# Patient Record
Sex: MALE | Race: WHITE | Employment: FULL TIME | URBAN - METROPOLITAN AREA
[De-identification: names, ages, dates, MRNs, and addresses within clinical notes are randomized per-mention and may not be internally consistent; named-entity substitution may affect disease eponyms.]

---

## 2017-08-16 ENCOUNTER — HOSPITAL ENCOUNTER (EMERGENCY)
Facility: HOSPITAL | Age: 47
Discharge: HOME | End: 2017-08-16
Attending: EMERGENCY MEDICINE
Payer: COMMERCIAL

## 2017-08-16 ENCOUNTER — APPOINTMENT (OUTPATIENT)
Dept: MRI IMAGING | Facility: HOSPITAL | Age: 47
End: 2017-08-16
Attending: EMERGENCY MEDICINE
Payer: COMMERCIAL

## 2017-08-16 VITALS
TEMPERATURE: 98 F | DIASTOLIC BLOOD PRESSURE: 96 MMHG | SYSTOLIC BLOOD PRESSURE: 146 MMHG | RESPIRATION RATE: 16 BRPM | HEART RATE: 82 BPM | OXYGEN SATURATION: 96 %

## 2017-08-16 DIAGNOSIS — H53.8 BLURRED VISION, LEFT EYE: Primary | ICD-10-CM

## 2017-08-16 LAB
BASOPHILS # BLD AUTO: 0.05 X10(3) UL (ref 0–0.1)
BASOPHILS NFR BLD AUTO: 0.9 %
BUN BLD-MCNC: 11 MG/DL (ref 8–20)
CALCIUM BLD-MCNC: 8.8 MG/DL (ref 8.3–10.3)
CHLORIDE: 103 MMOL/L (ref 101–111)
CO2: 29 MMOL/L (ref 22–32)
CREAT BLD-MCNC: 1.33 MG/DL (ref 0.7–1.3)
EOSINOPHIL # BLD AUTO: 0.08 X10(3) UL (ref 0–0.3)
EOSINOPHIL NFR BLD AUTO: 1.5 %
ERYTHROCYTE [DISTWIDTH] IN BLOOD BY AUTOMATED COUNT: 14 % (ref 11.5–16)
GLUCOSE BLD-MCNC: 82 MG/DL (ref 70–99)
HCT VFR BLD AUTO: 52.4 % (ref 37–53)
HGB BLD-MCNC: 18 G/DL (ref 13–17)
IMMATURE GRANULOCYTE COUNT: 0.02 X10(3) UL (ref 0–1)
IMMATURE GRANULOCYTE RATIO %: 0.4 %
LYMPHOCYTES # BLD AUTO: 1.17 X10(3) UL (ref 0.9–4)
LYMPHOCYTES NFR BLD AUTO: 21.7 %
MCH RBC QN AUTO: 31.8 PG (ref 27–33.2)
MCHC RBC AUTO-ENTMCNC: 34.4 G/DL (ref 31–37)
MCV RBC AUTO: 92.6 FL (ref 80–99)
MONOCYTES # BLD AUTO: 0.56 X10(3) UL (ref 0.1–0.6)
MONOCYTES NFR BLD AUTO: 10.4 %
NEUTROPHIL ABS PRELIM: 3.52 X10 (3) UL (ref 1.3–6.7)
NEUTROPHILS # BLD AUTO: 3.52 X10(3) UL (ref 1.3–6.7)
NEUTROPHILS NFR BLD AUTO: 65.1 %
PLATELET # BLD AUTO: 152 10(3)UL (ref 150–450)
POTASSIUM SERPL-SCNC: 4 MMOL/L (ref 3.6–5.1)
RBC # BLD AUTO: 5.66 X10(6)UL (ref 4.3–5.7)
RED CELL DISTRIBUTION WIDTH-SD: 46.9 FL (ref 35.1–46.3)
SED RATE-ML: 5 MM/HR (ref 0–12)
SODIUM SERPL-SCNC: 138 MMOL/L (ref 136–144)
WBC # BLD AUTO: 5.4 X10(3) UL (ref 4–13)

## 2017-08-16 PROCEDURE — 85025 COMPLETE CBC W/AUTO DIFF WBC: CPT | Performed by: EMERGENCY MEDICINE

## 2017-08-16 PROCEDURE — 80048 BASIC METABOLIC PNL TOTAL CA: CPT | Performed by: EMERGENCY MEDICINE

## 2017-08-16 PROCEDURE — 85652 RBC SED RATE AUTOMATED: CPT | Performed by: EMERGENCY MEDICINE

## 2017-08-16 PROCEDURE — 96374 THER/PROPH/DIAG INJ IV PUSH: CPT

## 2017-08-16 PROCEDURE — 99285 EMERGENCY DEPT VISIT HI MDM: CPT

## 2017-08-16 PROCEDURE — 99284 EMERGENCY DEPT VISIT MOD MDM: CPT

## 2017-08-16 PROCEDURE — A9575 INJ GADOTERATE MEGLUMI 0.1ML: HCPCS | Performed by: EMERGENCY MEDICINE

## 2017-08-16 PROCEDURE — 70553 MRI BRAIN STEM W/O & W/DYE: CPT | Performed by: EMERGENCY MEDICINE

## 2017-08-16 RX ORDER — LORAZEPAM 2 MG/ML
0.5 INJECTION INTRAMUSCULAR ONCE
Status: COMPLETED | OUTPATIENT
Start: 2017-08-16 | End: 2017-08-16

## 2017-08-17 NOTE — ED INITIAL ASSESSMENT (HPI)
Patient woke up at 0630 this am and stated his left eye vision was blurry. His vision was normal when he went to bed the previous night. He states he does have a slight headache, but he was working on a computer screen the last 8 hours.

## 2017-08-17 NOTE — ED PROVIDER NOTES
Patient Seen in: [facility] Emergency Department    History   Patient presents with:   Eye Visual Problem (opthalmic)    Stated Complaint: ha, visual changes    HPI    42-year-old male presents emergency room with chief complaint of visual disturbanc Intramuscular Solution,  INJECT 1ML EVERY 2 WEEKS   Syringe/Needle, Disp, (BD LUER-IDANIA SYRINGE) 25G X 1\" 3 ML Does not apply Misc,  To inject testosterone IM q 2 weeks   Atorvastatin Calcium (LIPITOR) 10 MG Oral Tab,  TAKE 1 PO DAILY   Levothyroxine Sodiu No limitation of ocular movements. Mucous membranes are moist, oropharynx is clear, uvula midline. Tympanic membranes are clear bilaterally, there is no external auditory canal swelling, there is no mastoid erythema or tenderness. Scalp is atraumatic. ocular pressures: 18 right, 17  left  ============================================================  ED Course  ------------------------------------------------------------  MDM   Patient had IV established, blood work obtained.   Patient was discussed with

## 2017-09-21 ENCOUNTER — OFFICE VISIT (OUTPATIENT)
Dept: NEUROLOGY | Facility: CLINIC | Age: 47
End: 2017-09-21

## 2017-09-21 VITALS
BODY MASS INDEX: 42 KG/M2 | WEIGHT: 274 LBS | HEART RATE: 78 BPM | RESPIRATION RATE: 16 BRPM | SYSTOLIC BLOOD PRESSURE: 120 MMHG | DIASTOLIC BLOOD PRESSURE: 64 MMHG

## 2017-09-21 DIAGNOSIS — H53.8 BLURRY VISION, LEFT EYE: Primary | ICD-10-CM

## 2017-09-21 PROCEDURE — 99205 OFFICE O/P NEW HI 60 MIN: CPT | Performed by: OTHER

## 2017-09-21 NOTE — PATIENT INSTRUCTIONS
Refill policies:    • Allow 2-3 business days for refills; controlled substances may take longer.   • Contact your pharmacy at least 5 days prior to running out of medication and have them send an electronic request or submit request through the [facility] have a procedure or additional testing performed. [facility]) will contact your insurance carrier to obtain pre-certification or prior authorization.     Unfortunately, [facility] has seen an increase in denial of payment even though the p

## 2017-09-21 NOTE — PROGRESS NOTES
Neurology H&P    Nicole Lu Patient Status:  No patient class for patient encounter    1970 MRN [id number]   Location [facility], [address] [place] [address] Drive, [address] [place] [address] Attending No att. providers found   Hosp Day # 0 PCP TIGIST COPE DO inject testosterone IM q 2 weeks Disp: 10 each Rfl: 1   Atorvastatin Calcium (LIPITOR) 10 MG Oral Tab TAKE 1 PO DAILY Disp: 90 tablet Rfl: 3   Levothyroxine Sodium (SYNTHROID) 50 MCG Oral Tab Take 1 tablet by mouth daily.  Disp: 90 tablet Rfl: 4   FLUTICASO fatigue  Musculoskeletal: denies back pain, denies joint pain  Psych: denies depression   Skin: denies any new masses, rashes, lumps or bruising    Objective/Physical Exam:    Vital Signs: There were no vitals taken for this visit.     Gen: Awake and in no blurry vision in the L eye but no red desaturation or painful eye movements. MRI imagine was reviewed and there were multiple small T2 hyperintensities and one larger L corona radiata lesion.  These do not necessarily fit with his monocular vision impairmen

## 2017-10-17 RX ORDER — SODIUM CHLORIDE 9 MG/ML
INJECTION, SOLUTION INTRAVENOUS CONTINUOUS
Status: CANCELLED | OUTPATIENT
Start: 2017-10-17

## 2017-10-17 RX ORDER — CLINDAMYCIN PHOSPHATE 900 MG/50ML
900 INJECTION INTRAVENOUS
Status: CANCELLED | OUTPATIENT
Start: 2017-10-18 | End: 2017-10-18

## 2017-10-18 ENCOUNTER — APPOINTMENT (OUTPATIENT)
Dept: LAB | Facility: HOSPITAL | Age: 47
End: 2017-10-18
Attending: Other
Payer: COMMERCIAL

## 2017-10-18 ENCOUNTER — HOSPITAL ENCOUNTER (OUTPATIENT)
Dept: MRI IMAGING | Facility: HOSPITAL | Age: 47
Discharge: HOME | End: 2017-10-18
Attending: Other
Payer: COMMERCIAL

## 2017-10-18 ENCOUNTER — HOSPITAL ENCOUNTER (OUTPATIENT)
Dept: GENERAL RADIOLOGY | Facility: HOSPITAL | Age: 47
Discharge: HOME | End: 2017-10-18
Attending: Other
Payer: COMMERCIAL

## 2017-10-18 ENCOUNTER — TELEPHONE (OUTPATIENT)
Dept: NEUROLOGY | Facility: CLINIC | Age: 47
End: 2017-10-18

## 2017-10-18 VITALS
HEART RATE: 72 BPM | DIASTOLIC BLOOD PRESSURE: 102 MMHG | HEIGHT: 68 IN | WEIGHT: 270 LBS | TEMPERATURE: 98 F | BODY MASS INDEX: 40.92 KG/M2 | SYSTOLIC BLOOD PRESSURE: 138 MMHG | OXYGEN SATURATION: 95 % | RESPIRATION RATE: 16 BRPM

## 2017-10-18 DIAGNOSIS — H53.8 BLURRY VISION, LEFT EYE: Primary | ICD-10-CM

## 2017-10-18 DIAGNOSIS — H53.8 BLURRY VISION, LEFT EYE: ICD-10-CM

## 2017-10-18 PROCEDURE — 70543 MRI ORBT/FAC/NCK W/O &W/DYE: CPT | Performed by: OTHER

## 2017-10-18 PROCEDURE — 83916 OLIGOCLONAL BANDS: CPT

## 2017-10-18 PROCEDURE — 36415 COLL VENOUS BLD VENIPUNCTURE: CPT

## 2017-10-18 PROCEDURE — 62270 DX LMBR SPI PNXR: CPT | Performed by: OTHER

## 2017-10-18 PROCEDURE — 82945 GLUCOSE OTHER FLUID: CPT

## 2017-10-18 PROCEDURE — 84157 ASSAY OF PROTEIN OTHER: CPT

## 2017-10-18 PROCEDURE — 89050 BODY FLUID CELL COUNT: CPT

## 2017-10-18 PROCEDURE — 89051 BODY FLUID CELL COUNT: CPT

## 2017-10-18 PROCEDURE — 70553 MRI BRAIN STEM W/O & W/DYE: CPT | Performed by: OTHER

## 2017-10-18 PROCEDURE — 85610 PROTHROMBIN TIME: CPT

## 2017-10-18 PROCEDURE — 77003 FLUOROGUIDE FOR SPINE INJECT: CPT | Performed by: OTHER

## 2017-10-18 PROCEDURE — 85027 COMPLETE CBC AUTOMATED: CPT

## 2017-10-18 PROCEDURE — A9575 INJ GADOTERATE MEGLUMI 0.1ML: HCPCS | Performed by: OTHER

## 2017-10-18 NOTE — PROCEDURES
PROCEDURE: XR LUMBAR PUNCTURE  (CPT=62270,79058)    COMPARISON: None.     INDICATIONS: H53.8 Other visual disturbances    TECHNIQUE: The risks, benefits, and alternatives of the procedure were explained to the patient and witnessed informed written and verb

## 2017-10-18 NOTE — IMAGING NOTE
Pt here for LP procedure. Pre-procedure vitals recorded and BP elevated in both arms. Denies use of any blood thinners currently. Procedure explained and pt verbalized understanding. SBAR given to [facility].  Elevated BP to Dr Tia Martínez prior to pr

## 2017-11-01 ENCOUNTER — OFFICE VISIT (OUTPATIENT)
Dept: NEUROLOGY | Facility: CLINIC | Age: 47
End: 2017-11-01

## 2017-11-01 VITALS
BODY MASS INDEX: 41 KG/M2 | WEIGHT: 270 LBS | SYSTOLIC BLOOD PRESSURE: 136 MMHG | RESPIRATION RATE: 16 BRPM | HEART RATE: 84 BPM | DIASTOLIC BLOOD PRESSURE: 90 MMHG

## 2017-11-01 DIAGNOSIS — G35 MULTIPLE SCLEROSIS (HCC): Primary | ICD-10-CM

## 2017-11-01 PROCEDURE — 99214 OFFICE O/P EST MOD 30 MIN: CPT | Performed by: OTHER

## 2017-11-01 RX ORDER — LEVOTHYROXINE SODIUM 0.07 MG/1
75 TABLET ORAL
COMMUNITY
End: 2021-12-02

## 2017-11-01 NOTE — PATIENT INSTRUCTIONS
Refill policies:    • Allow 2-3 business days for refills; controlled substances may take longer.   • Contact your pharmacy at least 5 days prior to running out of medication and have them send an electronic request or submit request through the [facility] have a procedure or additional testing performed. [facility]) will contact your insurance carrier to obtain pre-certification or prior authorization.     Unfortunately, DESHAUN has seen an increase in denial of payment even though the p

## 2017-11-01 NOTE — PROGRESS NOTES
Pt here for follow up for blurred vision. Pt reports no change in symptoms from last visit. Pt had LP and MRI on 10/18/17. Results available in [facility].

## 2017-11-01 NOTE — PROGRESS NOTES
Neurology H&P    [facility] Patient Status:  No patient class for patient encounter    1970 MRN [id number]   Location [address] [place] [address], [address] [place] [address], [address] [place] [address] Attending No att. providers found   Hosp Day # 0 PCP TIGIST COPE DO BLE numbness. He feels that his hips ar a little stiff. He does endorse that his vision seems a little blurry but not like what it was during his last episode that took him to the ED. He endorses constant fatigue. He denies any specific weakness.  He denies Unspecified essential hypertension        PSHx:  Past Surgical History:  No date: ARTHROSCOPY OF JOINT UNLISTED      Comment: yoav shoulder  8/2007,11/2009: HERNIA SURGERY      Comment: x3  09/2008: OTHER SURGICAL HISTORY      Comment: SINUS x2    SocHx:  S no tongue fasciculations or atrophy, strong shoulder shrug.     MOTOR EXAMINATION: normal tone, no fasciculations, normal strength throughout in UEs and LEs with exception of pain lmiting 4/5 L HF weakness    SENSORY EXAMINATION:  UE: intact to light touch, inflammatory or infectious disease. Demyelination is favored. No abnormal signal within the brainstem or cerebellum. Assessment: This is a 49 y/o male with blurry vision and MRI findings concerning for a demyelinating process.  LP showed 10+ haley

## 2017-11-08 ENCOUNTER — TELEPHONE (OUTPATIENT)
Dept: NEUROLOGY | Facility: CLINIC | Age: 47
End: 2017-11-08

## 2017-11-08 RX ORDER — GLATIRAMER 40 MG/ML
40 INJECTION, SOLUTION SUBCUTANEOUS
Qty: 12 SYRINGE | Refills: 11 | Status: SHIPPED | OUTPATIENT
Start: 2017-11-08 | End: 2018-03-01 | Stop reason: ALTCHOICE

## 2017-11-08 RX ORDER — CYCLOBENZAPRINE HCL 10 MG
10 TABLET ORAL 3 TIMES DAILY PRN
Qty: 20 TABLET | Refills: 0 | Status: SHIPPED | OUTPATIENT
Start: 2017-11-08 | End: 2021-01-26

## 2017-11-08 NOTE — TELEPHONE ENCOUNTER
Initiated [employer] form.  Left message on patient identified voicemail (ok with HIPPA consent) informing patient we need him to sign form and verify if he has separate pharmacy plan as unable to verify insurance through CoverMyMeds in order to initi

## 2017-11-08 NOTE — TELEPHONE ENCOUNTER
I spoke to . Jazmine Ortiz on the phone today. We talked about starting copaxone for his newly diagnosed MS. He is willing to start this medication. It has been ordered. 40mcg 3 times weekly injections.  I will also start a trial of Flexeril for lower bach and

## 2017-11-14 ENCOUNTER — TELEPHONE (OUTPATIENT)
Dept: NEUROLOGY | Facility: CLINIC | Age: 47
End: 2017-11-14

## 2017-11-14 NOTE — TELEPHONE ENCOUNTER
Spoke with patient. He was requesting status update on Copaxone order. Informed him that the PA is still pending with insurance, and we have been checking regularly on status.   Per fax received on 11/8/17 from [facility], they are processing the request, and we

## 2017-11-16 NOTE — TELEPHONE ENCOUNTER
Rec'd incoming fax from [facility] requesting additional questions be answered regarding PA for Copaxone. Form completed and placed in Dr. Bruna Walter folder for signature.

## 2017-11-17 NOTE — TELEPHONE ENCOUNTER
Form completed and signed . Faxed to [facility] prior Authorization. Confirmation received.      Copy of form in folder

## 2017-11-17 NOTE — TELEPHONE ENCOUNTER
See TE 11/8/17- Copaxone form from [address] [place] [address] (for additional information) completed and signed . Faxed to [facility] prior Authorization. Confirmation received    Left a detailed message on patient's confidential voicemail informing above.

## 2017-11-20 NOTE — TELEPHONE ENCOUNTER
Approval for Copaxone received via fax from [facility]. Approved, 11/17/2017-11/17/2019    PA# [id number] SHADY      Left a message on patient's confidential voicemail relaying above.      Starter form, approval, face sheet and insurance information fa

## 2017-11-30 NOTE — TELEPHONE ENCOUNTER
Received fax from [employer] asking for clarification of whether patient should be on generic Glatimer Acetate, which was shipped to patient on 11/22/17, or brand, which was sent to [employer] by [employer]. [employer] cannot offer support for Generic.   Pa

## 2017-12-05 NOTE — TELEPHONE ENCOUNTER
Patient states he faxed form back to us same day he received it. No documentation of form being received. Patient will refax form today.

## 2017-12-06 NOTE — TELEPHONE ENCOUNTER
Received signed form from patient. Form completed and faxed to [address] [place] [address]. Confirmation received.

## 2018-01-09 ENCOUNTER — OFFICE VISIT (OUTPATIENT)
Dept: NEUROLOGY | Facility: CLINIC | Age: 48
End: 2018-01-09

## 2018-01-09 VITALS
BODY MASS INDEX: 40.16 KG/M2 | SYSTOLIC BLOOD PRESSURE: 136 MMHG | HEIGHT: 68 IN | DIASTOLIC BLOOD PRESSURE: 98 MMHG | WEIGHT: 265 LBS | RESPIRATION RATE: 18 BRPM | HEART RATE: 90 BPM

## 2018-01-09 DIAGNOSIS — G35 MULTIPLE SCLEROSIS (HCC): Primary | ICD-10-CM

## 2018-01-09 PROCEDURE — 99213 OFFICE O/P EST LOW 20 MIN: CPT | Performed by: OTHER

## 2018-01-09 NOTE — PATIENT INSTRUCTIONS
Refill policies:    • Allow 2-3 business days for refills; controlled substances may take longer.   • Contact your pharmacy at least 5 days prior to running out of medication and have them send an electronic request or submit request through the [facility] have a procedure or additional testing performed. [facility]) will contact your insurance carrier to obtain pre-certification or prior authorization.     Unfortunately, DESHAUN has seen an increase in denial of payment even though the p

## 2018-01-09 NOTE — PROGRESS NOTES
Neurology H&P    Navid Schwarz Patient Status:  No patient class for patient encounter    1970 MRN [id number]   Location [address] [place] [address], [address] [place] [address] Drive, [address] [place] [address] Attending No att. providers found   Hosp Day # 0 PCP Sabrina Montemayor MD any change in his vision. He states that actually feels very well and has been more active lately and has last weight and is much more active. He tells me that the pain and numbness in his legs is gone.      Current Medications:    Current Outpatient Prescr thyroid     Acute recurrent maxillary sinusitis     FH: gastric cancer     Blurry vision, left eye     Multiple sclerosis (HCC)      PMHx:  Past Medical History:   Diagnosis Date   • Chronic rhinitis    • HYPOTHYROIDISM    • Other abnormal glucose     Loss and fund of knowledge.       CRANIAL NERVES II to XII: PERRLA, no ptosis or diplopia, EOM intact, loss of VF in bilateral lateral fields (states it is chronic since childhood), facial sensation intact, strong eye closure and lower facial muscles & jaw muscl infarction. No mass. 2. There are numerous foci of T2/flair hyperintensity in the periventricular deep white matter and subcortical white matter of the cerebrum for a patient of this age.  The differential diagnosis would include demyelination, chronic jen

## 2018-07-30 ENCOUNTER — OFFICE VISIT (OUTPATIENT)
Dept: NEUROLOGY | Facility: CLINIC | Age: 48
End: 2018-07-30
Payer: COMMERCIAL

## 2018-07-30 VITALS
WEIGHT: 262 LBS | SYSTOLIC BLOOD PRESSURE: 126 MMHG | BODY MASS INDEX: 40 KG/M2 | HEART RATE: 78 BPM | DIASTOLIC BLOOD PRESSURE: 80 MMHG

## 2018-07-30 DIAGNOSIS — G35 MULTIPLE SCLEROSIS (HCC): Primary | ICD-10-CM

## 2018-07-30 PROCEDURE — 99213 OFFICE O/P EST LOW 20 MIN: CPT | Performed by: OTHER

## 2018-07-30 RX ORDER — TESTOSTERONE CYPIONATE 200 MG/ML
200 INJECTION INTRAMUSCULAR
COMMUNITY
End: 2019-07-02 | Stop reason: ALTCHOICE

## 2018-07-30 NOTE — PROGRESS NOTES
Neurology H&P    [facility] Patient Status:  No patient class for patient encounter    1970 MRN [id number]   Location ED [facility], [address], [address] Attending No att. providers found   Hosp Day # 0 PCP Nita Osborn MD but this has been ongoing for him since his diagnosis with MS and he has been following up with opthalmology. He has no new numbness weakness or tingling. He denies any falls or difficulty walking. He has no leg pain at all now. He does feel more fatigue. Acute recurrent maxillary sinusitis     FH: gastric cancer     Blurry vision, left eye     Multiple sclerosis ([address]      PMHx:  Past Medical History:   Diagnosis Date   • Chronic rhinitis    • HYPOTHYROIDISM    • Other abnormal glucose     Loss of peripher fund of knowledge.       CRANIAL NERVES II to XII: PERRLA, no ptosis or diplopia, EOM intact, loss of VF in bilateral lateral fields (states it is chronic since childhood), facial sensation intact, strong eye closure and lower facial muscles & jaw muscles; infarction. No mass. 2. There are numerous foci of T2/flair hyperintensity in the periventricular deep white matter and subcortical white matter of the cerebrum for a patient of this age.  The differential diagnosis would include demyelination, chronic jen

## 2018-07-30 NOTE — PROGRESS NOTES
Pt is here for follow up for MS. Pt states that since the last time we seen him Pt states that he has had less motivation. Pt states he is the same since the last time we seen him.

## 2018-10-04 ENCOUNTER — TELEPHONE (OUTPATIENT)
Dept: SURGERY | Facility: CLINIC | Age: 48
End: 2018-10-04

## 2018-10-04 NOTE — TELEPHONE ENCOUNTER
Per Dr Lonny Gilliam notes:   Assessment: This is a 47 y/o male with multiple sclerosis. He should continue Copaxone at this time.       Plan:  1.  Multiple Sclerosis  - MRI brain with demyelinating lesions  - LP with >10 oligoclonal bands and MRI with eviden

## 2018-11-08 DIAGNOSIS — G35 MULTIPLE SCLEROSIS (HCC): ICD-10-CM

## 2018-11-08 RX ORDER — GLATIRAMER 40 MG/ML
INJECTION, SOLUTION SUBCUTANEOUS
Qty: 12 SYRINGE | Refills: 1 | Status: SHIPPED | OUTPATIENT
Start: 2018-11-08 | End: 2019-02-19

## 2018-11-08 NOTE — TELEPHONE ENCOUNTER
Medication: GLATIRAMER ACETATE 40 MG/ML Subcutaneous Solution Prefilled Syringe    Date of last refill: 11/08/2017 (#12/11)  [place]    Last office visit: 7/30/2018  Due back to clinic per last office note:  03/30/20

## 2018-12-19 ENCOUNTER — TELEPHONE (OUTPATIENT)
Dept: NEUROLOGY | Facility: CLINIC | Age: 48
End: 2018-12-19

## 2018-12-19 NOTE — TELEPHONE ENCOUNTER
No I would not start daily prednisone for his well controlled MS.  Chronic prednisone use has serious potential consequences and would not be appropriate at this time

## 2018-12-19 NOTE — TELEPHONE ENCOUNTER
Pt has been on prednisone for 7 days for a sinus infection and is almost done with the medication. Pt has MS and this medication has been helping. Pt needs to know if there is a medication similar to prednisone that Dr. Tatianna Molina can perscribe for the MS.

## 2019-01-28 ENCOUNTER — TELEPHONE (OUTPATIENT)
Dept: NEUROLOGY | Facility: CLINIC | Age: 49
End: 2019-01-28

## 2019-01-28 NOTE — TELEPHONE ENCOUNTER
Patient would like to discuss getting a Rx for CBD oil. Informed patient that [facility] does not prescribe CBD oil at this time. Patient requested that RN discuss with Dr. Pierre Vazquez as patient would like to get CBD oil from a reputable source.     Routed to pr

## 2019-01-29 NOTE — TELEPHONE ENCOUNTER
Called patient and informed him that Dr. Geovani Tejeda has no medical opinion on patient taking hemp/CBD oil for mood. Informed patient that he should speak with PCP. Patient VU and denies any further needs at this time.

## 2019-02-19 DIAGNOSIS — G35 MULTIPLE SCLEROSIS (HCC): ICD-10-CM

## 2019-02-19 RX ORDER — GLATIRAMER 40 MG/ML
INJECTION, SOLUTION SUBCUTANEOUS
Qty: 12 SYRINGE | Refills: 0 | Status: SHIPPED | OUTPATIENT
Start: 2019-02-19 | End: 2019-03-26

## 2019-02-19 NOTE — TELEPHONE ENCOUNTER
Medication: GLATIRAMER ACETATE 40 MG/ML Subcutaneous Solution Prefilled Syringe    Date of last refill: 11/08/18 (#12 syringe/1)  [place]    Last office visit: 07/30/18  Due back to clinic per last office note:  Devin Gitelman

## 2019-03-26 DIAGNOSIS — G35 MULTIPLE SCLEROSIS (HCC): ICD-10-CM

## 2019-03-26 RX ORDER — GLATIRAMER 40 MG/ML
INJECTION, SOLUTION SUBCUTANEOUS
Qty: 12 SYRINGE | Refills: 3 | Status: SHIPPED | OUTPATIENT
Start: 2019-03-26 | End: 2019-10-18

## 2019-03-26 NOTE — TELEPHONE ENCOUNTER
Spoke with patient and explained that he needs to schedule a follow-up visit with Dr Jimmie Felipe before refill can be processed. Patient transferred to  to set up nichole't.       Medication: GLATIRAMER ACETATE 40 MG/ML Subcutaneous Solution Prefilled Sy

## 2019-07-02 ENCOUNTER — OFFICE VISIT (OUTPATIENT)
Dept: NEUROLOGY | Facility: CLINIC | Age: 49
End: 2019-07-02
Payer: COMMERCIAL

## 2019-07-02 VITALS
SYSTOLIC BLOOD PRESSURE: 128 MMHG | DIASTOLIC BLOOD PRESSURE: 78 MMHG | HEART RATE: 80 BPM | WEIGHT: 272 LBS | RESPIRATION RATE: 14 BRPM | BODY MASS INDEX: 41.22 KG/M2 | HEIGHT: 68 IN

## 2019-07-02 DIAGNOSIS — G35 MULTIPLE SCLEROSIS (HCC): Primary | ICD-10-CM

## 2019-07-02 PROCEDURE — 99213 OFFICE O/P EST LOW 20 MIN: CPT | Performed by: OTHER

## 2019-07-02 RX ORDER — LANSOPRAZOLE 30 MG/1
CAPSULE, DELAYED RELEASE ORAL
COMMUNITY
End: 2019-07-02 | Stop reason: ALTCHOICE

## 2019-07-02 RX ORDER — HYDROCODONE BITARTRATE AND ACETAMINOPHEN 5; 325 MG/1; MG/1
1 TABLET ORAL
Refills: 0 | COMMUNITY
Start: 2019-05-31 | End: 2019-11-01 | Stop reason: ALTCHOICE

## 2019-07-02 RX ORDER — PREDNISONE 10 MG/1
TABLET ORAL
Refills: 0 | COMMUNITY
Start: 2019-06-03 | End: 2019-07-02 | Stop reason: ALTCHOICE

## 2019-07-02 NOTE — PROGRESS NOTES
Patient reports he has Allegra Ordonez. Patient states he went to the ER approximately 2 months ago with shingles in the ear causing face pain and speech impediment.

## 2019-07-02 NOTE — PROGRESS NOTES
Neurology H&P    Melissa Carrillo Patient Status:  No patient class for patient encounter    1970 MRN [id number]   Location [facility], [address] [place] [address] Drive, [address] [place] [address] Attending No att. providers found   [facility] Day # 0 PCP Levan Nageotte Su testosterone. He deneis nay new numbness, weakness or tingling. Current Medications:    Current Outpatient Medications:  HYDROcodone-acetaminophen 5-325 MG Oral Tab Take 1 tablet by mouth.  for pain Disp:  Rfl: 0   Testosterone (TESTOPEL [place]) by [employer]   • OTHER DISEASES     hypogonadism   • Unspecified essential hypertension        PSHx:  Past Surgical History:   Procedure Laterality Date   • ARTHROSCOPY OF JOINT UNLISTED      yoav shoulder   • HERNIA SURGERY  8/2007,11/2009    x3   • OTHER SURGIC intact, strong eye closure and lower facial muscles & jaw muscles; palate elevation intact, no dysarthria, no tongue fasciculations or atrophy, strong shoulder shrug.     MOTOR EXAMINATION: normal tone, no fasciculations, normal strength throughout in UEs a microvascular change,   changes related to previous inflammatory or infectious disease. Demyelination is favored. No abnormal signal within the brainstem or cerebellum. Assessment: This is a 43 y/o male with multiple sclerosis.  He should contin

## 2019-08-01 ENCOUNTER — TELEPHONE (OUTPATIENT)
Dept: NEUROLOGY | Facility: CLINIC | Age: 49
End: 2019-08-01

## 2019-08-01 DIAGNOSIS — G35 MULTIPLE SCLEROSIS (HCC): Primary | ICD-10-CM

## 2019-08-01 NOTE — TELEPHONE ENCOUNTER
Spoke with patient who states he would like to switch to [facility]. Requests Tecfidera Start Form be faxed to him for completion. Fax confirmation received.

## 2019-08-01 NOTE — TELEPHONE ENCOUNTER
Pt is currently on generic Copaxone; dr asked pt to call office when he's close to finishing his current supply of this medication because Dr said he wanted to switch pt to Tecfidera.

## 2019-08-05 ENCOUNTER — TELEPHONE (OUTPATIENT)
Dept: NEUROLOGY | Facility: CLINIC | Age: 49
End: 2019-08-05

## 2019-08-05 DIAGNOSIS — G35 MULTIPLE SCLEROSIS (HCC): Primary | ICD-10-CM

## 2019-08-05 NOTE — TELEPHONE ENCOUNTER
PA initiated for Tecfidera. Approved verbally by Dr Moreno Aguilar.     Diagnosis:  MS  Type: Primary  Date Diagnosed: 11/2017     Previous therapies:    Note date used and reason for change for applicable medications:  Example:  side effects, disease progresion,

## 2019-08-13 ENCOUNTER — TELEPHONE (OUTPATIENT)
Dept: NEUROLOGY | Facility: CLINIC | Age: 49
End: 2019-08-13

## 2019-08-13 DIAGNOSIS — G35 MULTIPLE SCLEROSIS (HCC): Primary | ICD-10-CM

## 2019-08-13 NOTE — TELEPHONE ENCOUNTER
See Alternate TE 8/01/2019 and 08/05/2019. Patient to stop Copaxone and start Tecfidera. Per TE 8/1/2019 - informed patient he needs CBC and CMP. Will discuss JCV with provider. PA started for Tecfidera. - Key Code AUTDULRT.     CBC and CMP have bee

## 2019-08-13 NOTE — TELEPHONE ENCOUNTER
MS paperwork was faxed to [facility] on 8/2/2019 and PA was not completed at that time. Called patient to inform him that PA had not been completed yet and apologized to patient.  Requested that patient come to [facility] to complete Tecfidera paperwork due to fax dorcas

## 2019-08-15 NOTE — TELEPHONE ENCOUNTER
Per Dr Corina Sadler, he does want patient to have JCV. Patient notified and Patient verbalizes understanding JCV needs to be done at [address] requisitions mailed to patient.

## 2019-09-26 ENCOUNTER — TELEPHONE (OUTPATIENT)
Dept: NEUROLOGY | Facility: CLINIC | Age: 49
End: 2019-09-26

## 2019-09-26 NOTE — TELEPHONE ENCOUNTER
LMTCB about his overdue test results to complete his Tecfidera start form. Upon return call please verify the pt is going to complete the JCV, CBC, and CMP. These need to be completed to start patient on Tecfidera.

## 2019-10-18 DIAGNOSIS — G35 MULTIPLE SCLEROSIS (HCC): ICD-10-CM

## 2019-10-18 RX ORDER — GLATIRAMER 40 MG/ML
INJECTION, SOLUTION SUBCUTANEOUS
Qty: 12 SYRINGE | Refills: 2 | Status: SHIPPED | OUTPATIENT
Start: 2019-10-18 | End: 2020-11-19

## 2019-10-18 NOTE — TELEPHONE ENCOUNTER
Per Epic review, patient has appointment on 11/1/2019. To date, no JCV, CBC or CMP has been received in office. Tecfidera has been approved from 8/13/2019 to 8/13/2021. Called patient and discussed above testing with him.  He states that he will c

## 2019-11-01 ENCOUNTER — OFFICE VISIT (OUTPATIENT)
Dept: NEUROLOGY | Facility: CLINIC | Age: 49
End: 2019-11-01
Payer: COMMERCIAL

## 2019-11-01 VITALS
WEIGHT: 275 LBS | SYSTOLIC BLOOD PRESSURE: 130 MMHG | RESPIRATION RATE: 16 BRPM | DIASTOLIC BLOOD PRESSURE: 84 MMHG | HEART RATE: 80 BPM | BODY MASS INDEX: 42 KG/M2

## 2019-11-01 DIAGNOSIS — G35 MULTIPLE SCLEROSIS (HCC): Primary | ICD-10-CM

## 2019-11-01 PROCEDURE — 99213 OFFICE O/P EST LOW 20 MIN: CPT | Performed by: OTHER

## 2019-11-01 RX ORDER — DIMETHYL FUMARATE 240 MG/1
240 CAPSULE ORAL 2 TIMES DAILY
Qty: 180 CAPSULE | Refills: 3 | COMMUNITY
Start: 2019-11-01 | End: 2020-08-21

## 2019-11-01 RX ORDER — DIMETHYL FUMARATE 120 MG/1
CAPSULE ORAL
Qty: 14 CAPSULE | Refills: 0 | COMMUNITY
Start: 2019-11-01 | End: 2020-10-13

## 2019-11-01 NOTE — TELEPHONE ENCOUNTER
Pt in office for follow up 11/1/19. Medication previously approved. Blood work completed. Start form completed, insurance/facesheet, approval info and JCV faxed to [employer]. Confirmation received.

## 2019-11-01 NOTE — PROGRESS NOTES
Pt states he is in office to discuss MS medication change, he states his condition has not worsened.     Pt also states that during a recent trip he tried [facility] edibles and states he experienced significant relief and, pt states he was able to rest without pa

## 2019-11-01 NOTE — PROGRESS NOTES
Neurology H&P    Almaz Peralta Patient Status:  No patient class for patient encounter    1970 MRN [id number]   Location [address] [place] [address], [address] [place] [address] Drive, [address] [place] [address] Attending No att. providers found   [facility] Day # 0 PCP Ginny Simmonds Su at this time. Current Medications:  GLATIRAMER ACETATE 40 MG/ML Subcutaneous Solution Prefilled Syringe, INJECT ONE SYRINGE SUBCUTANEOUSLY 3 TIMES A WEEK AT LEAST 48 HOURS APART. ALLOW TO WARM TO ROOM TEMP FOR 20 MINUTES.  REFRIGERATE., Disp: 12 Syring Loss of peripheral vision   • OTHER DISEASES     hypogonadism   • Shingles     Chrystine Smoker   • Unspecified essential hypertension        PSHx:  Past Surgical History:   Procedure Laterality Date   • ARTHROSCOPY OF JOINT UNLISTED      yoav shoulder   • HERNI childhood), facial sensation intact, strong eye closure and lower facial muscles & jaw muscles; palate elevation intact, no dysarthria, no tongue fasciculations or atrophy, strong shoulder shrug.     MOTOR EXAMINATION: normal tone, no fasciculations, normal 6.1 - 8.1 g/dL 6.7   Albumin      3.6 - 5.1 g/dL 4.1   Globulin, Total      1.9 - 3.7 g/dL (calc) 2.6   ALBUMIN/GLOBULIN RATIO      1.0 - 2.5 (calc) 1.6   Total Bilirubin      0.2 - 1.2 mg/dL 0.8   Alkaline Phosphatase      40 - 115 U/L 72   AST      10 -

## 2020-07-06 ENCOUNTER — PATIENT MESSAGE (OUTPATIENT)
Dept: NEUROLOGY | Facility: CLINIC | Age: 50
End: 2020-07-06

## 2020-07-06 NOTE — TELEPHONE ENCOUNTER
From: Ken Subramanian  To: Jimena Lopes DO  Sent: 7/6/2020 11:17 AM CDT  Subject: Prescription Question    I have noticed stomach issues on Tecfidera. 90% of them are tolerable. A glass of wine or a vodka drink feels like a stabbing cramping pain.  No

## 2020-08-11 ENCOUNTER — PATIENT MESSAGE (OUTPATIENT)
Dept: NEUROLOGY | Facility: CLINIC | Age: 50
End: 2020-08-11

## 2020-08-11 NOTE — TELEPHONE ENCOUNTER
[facility]  To: Poly Bynum DO  Sent: 8/11/2020 10:33 AM CDT  Subject: Prescription Question    \" I suppose that you could gop off the tecfidera for a couple of weeks and see if that is what ios causing your stomach cramping.  You have no

## 2020-08-14 ENCOUNTER — PATIENT MESSAGE (OUTPATIENT)
Dept: NEUROLOGY | Facility: CLINIC | Age: 50
End: 2020-08-14

## 2020-08-14 DIAGNOSIS — G35 MULTIPLE SCLEROSIS (HCC): Primary | ICD-10-CM

## 2020-08-17 NOTE — TELEPHONE ENCOUNTER
From: Monica Mcdaniel  To: Author DO Monica  Sent: 8/14/2020 4:33 PM CDT  Subject: Visit Follow-up Question    I am willing to give the 120 a try. I tried to reply to the other email but it says it closed.  Do you know if I would need to re-set up the

## 2020-08-21 RX ORDER — DIMETHYL FUMARATE 120 MG/1
120 CAPSULE ORAL 2 TIMES DAILY
Qty: 60 CAPSULE | Refills: 0 | Status: SHIPPED | OUTPATIENT
Start: 2020-08-21 | End: 2020-09-05

## 2020-08-21 NOTE — TELEPHONE ENCOUNTER
Spoke with Dr. Idalia Mesa to give 30 day supply of 120 mg Tecfidera. Called patient and he states he gets Tecfidera from [facility].     Instructed patient to call [facility] after 2 weeks on new dose to update on status of how medication is bein

## 2020-08-24 NOTE — TELEPHONE ENCOUNTER
Prescription clarification request received and completed confirming dosage change to Tecfidera 120mg bid. Form faxed to [facility] pharmacy with fax confirmation received.

## 2020-09-10 ENCOUNTER — TELEPHONE (OUTPATIENT)
Dept: NEUROLOGY | Facility: CLINIC | Age: 50
End: 2020-09-10

## 2020-09-10 NOTE — TELEPHONE ENCOUNTER
Spoke to Kody at [employer] and gave verbal approval for generic Tecfidera ( after confirming with Dr Sebas Montez.)

## 2020-09-15 ENCOUNTER — TELEPHONE (OUTPATIENT)
Dept: NEUROLOGY | Facility: CLINIC | Age: 50
End: 2020-09-15

## 2020-09-15 NOTE — TELEPHONE ENCOUNTER
Received fax from [facility] requesting clinical info regarding PA . PA completed on CMM and Tecfidera approved 8/13/2019-8/13/2021.

## 2020-09-16 NOTE — TELEPHONE ENCOUNTER
Form from [facility] regarding the reduced dosage of Tecfidera completed and placed on provider's desk for review and signature.

## 2020-09-18 NOTE — TELEPHONE ENCOUNTER
Receicved call from Kody with [facility] pharmacy. She states they are unable to ship medication as there is a quantity limit. Per Kody there is a quantity limit issue. RN is unable to locate fax sent on 9/17.

## 2020-09-23 NOTE — TELEPHONE ENCOUNTER
Spoke with Marquis Scheuermann at [address] [place] [address] who states the Rx Tecfidera 120mg still will not go through.   Fax received from [facility] that PA Tecfidera 120mg denied- Does not allow coverage of additional quantities of Tecfidera if lower dose of 120mg bid has alrea

## 2020-09-24 NOTE — TELEPHONE ENCOUNTER
Spoke with Galo Roman at [address] [place] [address] [employer] she states because patient is titrating down in dosage, patient's insurance will only approve Tecfidera 120mg #14 (1 week supply) and appeal needs to be submitted as Urgent.  Patient can receive 1 week supply at a

## 2020-09-28 NOTE — TELEPHONE ENCOUNTER
Received authorization for Tecfidera 120 mg valid from 8/25/2020 to 10/23/2020. Per notes below, appeal letter has been signed, but will need to be sent.

## 2020-09-29 NOTE — TELEPHONE ENCOUNTER
Spoke with patient who states he has not received the Tecfidera yet.  Explained to patient that an appeal letter was submitted and he has been approved Tecfidera 120mg bid weekly through 10/23/20  Spoke with Martir Garvin at [employer] who states the patient has

## 2020-10-13 DIAGNOSIS — G35 MULTIPLE SCLEROSIS (HCC): ICD-10-CM

## 2020-10-13 RX ORDER — DIMETHYL FUMARATE 120 MG/1
CAPSULE ORAL
Qty: 56 CAPSULE | Refills: 0 | Status: SHIPPED | OUTPATIENT
Start: 2020-10-13 | End: 2020-11-19 | Stop reason: ALTCHOICE

## 2020-10-28 ENCOUNTER — TELEPHONE (OUTPATIENT)
Dept: NEUROLOGY | Facility: CLINIC | Age: 50
End: 2020-10-28

## 2020-11-02 NOTE — TELEPHONE ENCOUNTER
Tecfidera PA for 120mg bid faxed to [facility] with fax confirmation received.

## 2020-11-13 ENCOUNTER — TELEPHONE (OUTPATIENT)
Dept: NEUROLOGY | Facility: CLINIC | Age: 50
End: 2020-11-13

## 2020-11-13 NOTE — TELEPHONE ENCOUNTER
PA for Tecfidera 120mg bid renewal submitted on CMM, Key:[id number] PA resolved per CMM, no further action required. Spoke with Brandy Garcia at [employer] who states Tecfidera approval in system.

## 2020-11-19 ENCOUNTER — PATIENT MESSAGE (OUTPATIENT)
Dept: NEUROLOGY | Facility: CLINIC | Age: 50
End: 2020-11-19

## 2020-11-19 ENCOUNTER — TELEPHONE (OUTPATIENT)
Dept: NEUROLOGY | Facility: CLINIC | Age: 50
End: 2020-11-19

## 2020-11-19 DIAGNOSIS — G35 MULTIPLE SCLEROSIS (HCC): Primary | ICD-10-CM

## 2020-11-19 RX ORDER — DIROXIMEL FUMARATE 231 MG/1
CAPSULE ORAL
Qty: 98 CAPSULE | Refills: 0 | Status: SHIPPED | OUTPATIENT
Start: 2020-11-19 | End: 2020-12-26

## 2020-11-19 NOTE — TELEPHONE ENCOUNTER
I called.,  Olivia Jayshree regarding his tecfidera. He continues to have problems with iunsurance approval and getting the proper dose and quantity of medications. My staff has spent a great deal of time with the pharmacy to correct this to no avail.  We will s

## 2020-11-19 NOTE — TELEPHONE ENCOUNTER
Spoke to Catheryn Ganser at Pharmacy, states that last delivery of medication was 11/11/2020 with a $20 co-pay

## 2020-11-19 NOTE — TELEPHONE ENCOUNTER
Spoke to Alexis at [employer], she states that pt insurance is only covering 14 tablets in a 28 day period.

## 2020-11-19 NOTE — TELEPHONE ENCOUNTER
From: Madeline Villasenor  To: Gloria Dawn DO  Sent: 11/19/2020 1:19 PM CST  Subject: Prescription Question    Hello, [facility] Dimethyl fumarate 120 mg keeps getting rejected from my insurance.  Is there something different I can go on?

## 2020-11-19 NOTE — TELEPHONE ENCOUNTER
Spoke to [employer] rep. He states that patient may need to switch to Vumerity if provider is okay with it due to issues with getting generics for Tecfidera. Will inform provider.

## 2020-11-19 NOTE — TELEPHONE ENCOUNTER
Spoke to patient, states he received a full supply last time, but only a 1 week supply this last shipment on 11/11/2020.

## 2020-12-24 DIAGNOSIS — G35 MULTIPLE SCLEROSIS (HCC): Primary | ICD-10-CM

## 2020-12-24 NOTE — TELEPHONE ENCOUNTER
JACINDA on VM (ok per HIPAA consent) to schedule a f/u nichole't before refill can be processed.       Medication: Vumerity    Date of last refill: 11/19/20 (#98/0)  [place]     Last office visit: 11/19/19  Due back to clinic p

## 2021-01-06 ENCOUNTER — TELEPHONE (OUTPATIENT)
Dept: NEUROLOGY | Facility: CLINIC | Age: 51
End: 2021-01-06

## 2021-01-06 ENCOUNTER — PATIENT MESSAGE (OUTPATIENT)
Dept: NEUROLOGY | Facility: CLINIC | Age: 51
End: 2021-01-06

## 2021-01-06 DIAGNOSIS — G35 MULTIPLE SCLEROSIS (HCC): Primary | ICD-10-CM

## 2021-01-06 RX ORDER — DIROXIMEL FUMARATE 231 MG/1
462 CAPSULE ORAL 2 TIMES DAILY
Qty: 120 CAPSULE | Refills: 0 | Status: SHIPPED | OUTPATIENT
Start: 2021-01-06 | End: 2021-02-01

## 2021-01-06 RX ORDER — DIROXIMEL FUMARATE 231 MG/1
462 CAPSULE ORAL 2 TIMES DAILY
Qty: 120 CAPSULE | Refills: 0 | Status: SHIPPED | OUTPATIENT
Start: 2021-01-06 | End: 2021-01-06

## 2021-01-06 NOTE — TELEPHONE ENCOUNTER
Received [employer] message from patient indicating that PA needs to be completed for Vumerity. Per Epic review, PA for Bhumi Davidson was never started. Per patient message TE on 11/19/2020 - patient signed start form for Vumerity.     Called patient and info

## 2021-01-06 NOTE — TELEPHONE ENCOUNTER
Per Epic review, medication sen to wrong pharmacy. Reordered mediation to specialty pharmacy. Called [facility] in [place] to discuss above. Cancelled Rx that was sent locally.

## 2021-01-06 NOTE — TELEPHONE ENCOUNTER
Started PA on CMM with key code:   Marion Bella. Received the following notification from [facility]:    Your PA has been resolved, no additional PA is required. For further inquiries please contact the number on the back of the member prescription card.  (Message

## 2021-01-15 NOTE — TELEPHONE ENCOUNTER
Received refill request for [facility] from [facility]. Spoke with Mariela she states there are no refills noted on 1/6 Rx. Advised that this is a new dose for patient and to set up refill request to be sent to [facility] beginning of February.

## 2021-01-18 DIAGNOSIS — G35 MULTIPLE SCLEROSIS (HCC): ICD-10-CM

## 2021-01-18 RX ORDER — DIROXIMEL FUMARATE 231 MG/1
462 CAPSULE ORAL 2 TIMES DAILY
Qty: 120 CAPSULE | OUTPATIENT
Start: 2021-01-18

## 2021-01-18 NOTE — TELEPHONE ENCOUNTER
Medication: Diroximel Fumarate (VUMERITY) 231 MG Oral Capsule Delayed Release    Date of last refill: 1/6/2021 (#120/0)    Attempted to call pharmacy, closed for holiday observance. Will try again tomorrow.

## 2021-01-26 ENCOUNTER — OFFICE VISIT (OUTPATIENT)
Dept: NEUROLOGY | Facility: CLINIC | Age: 51
End: 2021-01-26
Payer: COMMERCIAL

## 2021-01-26 VITALS
DIASTOLIC BLOOD PRESSURE: 70 MMHG | SYSTOLIC BLOOD PRESSURE: 124 MMHG | WEIGHT: 275 LBS | RESPIRATION RATE: 16 BRPM | BODY MASS INDEX: 42 KG/M2 | HEART RATE: 72 BPM

## 2021-01-26 DIAGNOSIS — G35 MULTIPLE SCLEROSIS (HCC): Primary | ICD-10-CM

## 2021-01-26 PROCEDURE — 3078F DIAST BP <80 MM HG: CPT | Performed by: OTHER

## 2021-01-26 PROCEDURE — 99213 OFFICE O/P EST LOW 20 MIN: CPT | Performed by: OTHER

## 2021-01-26 PROCEDURE — 3074F SYST BP LT 130 MM HG: CPT | Performed by: OTHER

## 2021-01-26 RX ORDER — DIROXIMEL FUMARATE 231 MG/1
CAPSULE ORAL
Qty: 120 CAPSULE | Refills: 0 | OUTPATIENT
Start: 2021-01-26

## 2021-01-26 RX ORDER — ATORVASTATIN CALCIUM 40 MG/1
1 TABLET, FILM COATED ORAL DAILY
COMMUNITY
Start: 2020-11-29

## 2021-01-26 NOTE — PROGRESS NOTES
Neurology H&P    Anurag Srinivasan Patient Status:  No patient class for patient encounter    1970 MRN [id number]   Location [address] [facility], [address], [address] [facility] Attending No att. providers found   [address] Day # 0 PCP [place]     Alford symptoms. He states that this blurry vision has resolved at this time. He is doing well.  He states that he is not having GI effects on Vumerity    Current Medications:  Current Outpatient Medications   Medication Sig Dispense Refill   • atorvastatin 40 MG • ARTHROSCOPY OF JOINT UNLISTED      yoav shoulder   • HERNIA SURGERY  8/2007,11/2009    x3   • OTHER SURGICAL HISTORY  09/2008    SINUS x2       SocHx:  Social History    Tobacco Use      Smoking status: Former Smoker        Packs/day: 1.00        Years: BLOOD CELL COUNT      3.8 - 10.8 Thousand/uL 6.0   RED BLOOD CELL COUNT      4.20 - 5.80 Million/uL 7.03 (H)   Hemoglobin      13.2 - 17.1 g/dL 20.4 (H)   Hematocrit      38.5 - 50.0 % 60.5 (H)   MCV      80.0 - 100.0 fL 86.1   MCH      27.0 - 33.0 pg 29. 0 the cerebrum for a patient of this age. The differential diagnosis would include demyelination, chronic microvascular change,   changes related to previous inflammatory or infectious disease. Demyelination is favored.  No abnormal signal within the brainste

## 2021-01-28 ENCOUNTER — PATIENT MESSAGE (OUTPATIENT)
Dept: NEUROLOGY | Facility: CLINIC | Age: 51
End: 2021-01-28

## 2021-01-28 DIAGNOSIS — G35 MULTIPLE SCLEROSIS (HCC): Primary | ICD-10-CM

## 2021-01-28 LAB
ALBUMIN/GLOBULIN RATIO: 1.5 (CALC) (ref 1–2.5)
ALBUMIN: 4.3 G/DL (ref 3.6–5.1)
ALKALINE PHOSPHATASE: 69 U/L (ref 35–144)
ALT: 89 U/L (ref 9–46)
AST: 52 U/L (ref 10–35)
BILIRUBIN, TOTAL: 1.2 MG/DL (ref 0.2–1.2)
BUN: 17 MG/DL (ref 7–25)
CALCIUM: 9.6 MG/DL (ref 8.6–10.3)
CARBON DIOXIDE: 30 MMOL/L (ref 20–32)
CHLORIDE: 101 MMOL/L (ref 98–110)
CREATININE: 1.13 MG/DL (ref 0.7–1.33)
EGFR IF AFRICN AM: 86 ML/MIN/1.73M2
EGFR IF NONAFRICN AM: 74 ML/MIN/1.73M2
GLOBULIN: 2.9 G/DL (CALC) (ref 1.9–3.7)
GLUCOSE: 94 MG/DL (ref 65–99)
HEMATOCRIT: 54.1 % (ref 38.5–50)
HEMOGLOBIN: 18.5 G/DL (ref 13.2–17.1)
MCH: 29.9 PG (ref 27–33)
MCHC: 34.2 G/DL (ref 32–36)
MCV: 87.4 FL (ref 80–100)
MPV: 12.8 FL (ref 7.5–12.5)
PLATELET COUNT: 199 THOUSAND/UL (ref 140–400)
POTASSIUM: 3.7 MMOL/L (ref 3.5–5.3)
PROTEIN, TOTAL: 7.2 G/DL (ref 6.1–8.1)
RDW: 15.2 % (ref 11–15)
RED BLOOD CELL COUNT: 6.19 MILLION/UL (ref 4.2–5.8)
SODIUM: 139 MMOL/L (ref 135–146)
WHITE BLOOD CELL COUNT: 5.8 THOUSAND/UL (ref 3.8–10.8)

## 2021-01-28 NOTE — TELEPHONE ENCOUNTER
From: Lavonne Nelson  To: Naveen Dick DO  Sent: 1/28/2021 1:24 PM CST  Subject: Test Results Question    With the AST and ALT doubling from last years results, should I be stopping the vumerity?

## 2021-02-01 DIAGNOSIS — G35 MULTIPLE SCLEROSIS (HCC): ICD-10-CM

## 2021-02-01 RX ORDER — DIROXIMEL FUMARATE 231 MG/1
462 CAPSULE ORAL 2 TIMES DAILY
Qty: 360 CAPSULE | Refills: 1 | Status: SHIPPED | OUTPATIENT
Start: 2021-02-01 | End: 2021-03-03

## 2021-02-01 NOTE — TELEPHONE ENCOUNTER
Medication: Vumerity    Date of last refill: 1/6/21 (#120/0)  [place]     Last office visit: 1/26/2021  Due back to clinic per last office note:  6 months  Date next office visit scheduled:    Future Appointments   Date

## 2021-07-27 ENCOUNTER — OFFICE VISIT (OUTPATIENT)
Dept: NEUROLOGY | Facility: CLINIC | Age: 51
End: 2021-07-27
Payer: COMMERCIAL

## 2021-07-27 VITALS
WEIGHT: 268 LBS | SYSTOLIC BLOOD PRESSURE: 122 MMHG | HEART RATE: 70 BPM | RESPIRATION RATE: 16 BRPM | DIASTOLIC BLOOD PRESSURE: 70 MMHG | BODY MASS INDEX: 41 KG/M2

## 2021-07-27 DIAGNOSIS — G35 MULTIPLE SCLEROSIS (HCC): Primary | ICD-10-CM

## 2021-07-27 PROCEDURE — 3074F SYST BP LT 130 MM HG: CPT | Performed by: OTHER

## 2021-07-27 PROCEDURE — 99213 OFFICE O/P EST LOW 20 MIN: CPT | Performed by: OTHER

## 2021-07-27 PROCEDURE — 3078F DIAST BP <80 MM HG: CPT | Performed by: OTHER

## 2021-07-27 RX ORDER — DIROXIMEL FUMARATE 231 MG/1
2 CAPSULE ORAL 2 TIMES DAILY
COMMUNITY
Start: 2021-06-25 | End: 2021-10-18

## 2021-07-27 NOTE — PROGRESS NOTES
Neurology H&P    Edvin Flattery Patient Status:  No patient class for patient encounter    1970 MRN [id number]   Location [address] [facility], [address] [place] [address] Drive, [address] [place] [address] Attending No att. providers found   [facility] Day # 0 PCP Matthew Alford period. He states that these are similar sytoms to when he was first diagnosed with MS.  He states that he has also noted that he sometimes gets an electrical sensation through his whole back when exercising    Current Medications:  Current Outpatient Medic SURGERY  8/2007,11/2009    x3   • OTHER SURGICAL HISTORY  09/2008    SINUS x2       SocHx:  Social History    Tobacco Use      Smoking status: Former Smoker        Packs/day: 1.00        Years: 20.00        Pack years: 21        Quit date: 1/29/2000 7.03 (H)   Hemoglobin      13.2 - 17.1 g/dL 20.4 (H)   Hematocrit      38.5 - 50.0 % 60.5 (H)   MCV      80.0 - 100.0 fL 86.1   MCH      27.0 - 33.0 pg 29.0   MCHC      32.0 - 36.0 g/dL 33.7   RDW      11.0 - 15.0 % 15.5 (H)   Platelet Count      245 - 400 chronic microvascular change,   changes related to previous inflammatory or infectious disease. Demyelination is favored. No abnormal signal within the brainstem or cerebellum. Assessment: This is a 45 y/o male with multiple sclerosis.  Continue

## 2021-08-26 ENCOUNTER — TELEPHONE (OUTPATIENT)
Dept: NEUROLOGY | Facility: CLINIC | Age: 51
End: 2021-08-26

## 2021-08-26 DIAGNOSIS — G35 MULTIPLE SCLEROSIS (HCC): Primary | ICD-10-CM

## 2021-08-26 NOTE — TELEPHONE ENCOUNTER
Provider signed PA request for Vumerity and completed form faxed to [address] [place] [address] with confirmation received.

## 2021-10-03 ENCOUNTER — APPOINTMENT (OUTPATIENT)
Dept: GENERAL RADIOLOGY | Facility: HOSPITAL | Age: 51
End: 2021-10-03
Attending: EMERGENCY MEDICINE
Payer: COMMERCIAL

## 2021-10-03 PROCEDURE — 71045 X-RAY EXAM CHEST 1 VIEW: CPT | Performed by: EMERGENCY MEDICINE

## 2021-10-04 NOTE — CM/SW NOTE
Received call from Polly Pate, pharmacist with [employer] in [facility] at [phone] regarding patient's Abx prescription.     Per Polly Pate, patient is at pharmacy currently to  Ambien and Doxycycline, and only Ambien prescription came from [facility] via [facility]

## 2021-10-04 NOTE — ED INITIAL ASSESSMENT (HPI)
Pt presents to ed ambulatory with steady gait dx with covid on 9/20, given antibodies on 9/24 and felt better on 9/25 and 9/26 but now c/o worsening symptoms including sore throat, fevers, palpitations, and states he is having difficulty controlling his em

## 2021-10-04 NOTE — ED PROVIDER NOTES
Patient Seen in: [facility] Emergency Department      History   Patient presents with:  Arrythmia/Palpitations  Difficulty Breathing    Stated Complaint: covid+ two weeks ago, palpitations, sob and emotional     Subjective:   HPI     Patient is a 46 Review of Systems    Positive for stated complaint: covid+ two weeks ago, palpitations, sob and emotional   Other systems are as noted in HPI. Constitutional and vital signs reviewed.       All other systems reviewed and negative except as noted abov All other components within normal limits   LDH - Abnormal; Notable for the following components:     (*)     All other components within normal limits   FERRITIN - Abnormal; Notable for the following components:    Ferritin 878.0 (*)     All other Abnormal; Notable for the following components:    Ferritin 878.0 (*)     All other components within normal limits   MANUAL DIFFERENTIAL - Abnormal; Notable for the following components:    Neutrophil Absolute Manual 7.77 (*)     Monocyte Absolute Manual Dictated by (CST): Noemi Dey MD on 10/04/2021 at 0:12 AM         Finalized by (CST): Noemi Dey MD on 10/04/2021 at 0:14 AM            Patient laboratory work-up reviewed. Sodium 134.   BUN is 26 creatinine is normal potassium cintia

## 2021-10-05 ENCOUNTER — TELEPHONE (OUTPATIENT)
Dept: NEUROLOGY | Facility: CLINIC | Age: 51
End: 2021-10-05

## 2021-10-05 NOTE — TELEPHONE ENCOUNTER
Patient was in ER for symptoms of long haulers covid. ER recommended pt make appointment with Dr. Rosa Maria Villa, however pt is already established with Dr. Tatianna Molina. Patient was offered an appointment with Tatianna Molina this friday at 3:40.   Pt is asking if he ca

## 2021-10-05 NOTE — TELEPHONE ENCOUNTER
Discussed patient with provider and called patient back. Patient requesting \"long hauler COVID visit\" due to diagnosis at ER on 10/3/2021. Patient diagnosed with COVID on 9/22/2021 at will be 14 days post COVID test on 10/6/2021.   Discussed long-ha MIRIAM or ER again for evaluation due to current state of irritability, but RN reports she does not think that they will do that. Patient's spouse heard audibly crying and RN questioned patient and patient's spouse's safety.   Patient's spouse reports she is

## 2021-10-05 NOTE — TELEPHONE ENCOUNTER
I called Josr Peacock back. He states that he has a hard time thinking. He has a sore throat. He has emotional lability. He cries easily. He is sleeping poorly. He states that he is extremely and depressed.  He tells me that he has not thought about harming himsel

## 2021-10-18 DIAGNOSIS — G35 MULTIPLE SCLEROSIS (HCC): Primary | ICD-10-CM

## 2021-10-18 RX ORDER — DIROXIMEL FUMARATE 231 MG/1
CAPSULE ORAL
Qty: 120 CAPSULE | Refills: 0 | Status: SHIPPED | OUTPATIENT
Start: 2021-10-18 | End: 2021-12-28

## 2021-10-18 NOTE — TELEPHONE ENCOUNTER
Medication: VUMERITY 231 MG Oral Capsule Delayed Release    Date of last refill: 02/01/2021 (#360/1)  [place]    Last office visit: 07/27/2021  Due back to clinic per last office note:  Around 10/27/2021  Date next o

## 2021-10-26 ENCOUNTER — OFFICE VISIT (OUTPATIENT)
Dept: NEUROLOGY | Facility: CLINIC | Age: 51
End: 2021-10-26
Payer: COMMERCIAL

## 2021-10-26 VITALS
BODY MASS INDEX: 36 KG/M2 | WEIGHT: 234 LBS | HEART RATE: 72 BPM | SYSTOLIC BLOOD PRESSURE: 124 MMHG | RESPIRATION RATE: 18 BRPM | DIASTOLIC BLOOD PRESSURE: 82 MMHG

## 2021-10-26 DIAGNOSIS — G35 MULTIPLE SCLEROSIS (HCC): Primary | ICD-10-CM

## 2021-10-26 PROCEDURE — 99213 OFFICE O/P EST LOW 20 MIN: CPT | Performed by: OTHER

## 2021-10-26 PROCEDURE — 3074F SYST BP LT 130 MM HG: CPT | Performed by: OTHER

## 2021-10-26 PROCEDURE — 3079F DIAST BP 80-89 MM HG: CPT | Performed by: OTHER

## 2021-10-26 RX ORDER — AMLODIPINE BESYLATE 10 MG/1
10 TABLET ORAL EVERY EVENING
COMMUNITY
Start: 2021-09-21

## 2021-10-26 RX ORDER — CLONAZEPAM 1 MG/1
TABLET ORAL
COMMUNITY
Start: 2021-10-07 | End: 2021-11-26

## 2021-10-26 NOTE — PROGRESS NOTES
Neurology H&P    Joey Corral Patient Status:  No patient class for patient encounter    1970 MRN [id number]   Location ED [facility], [address], [address] Attending No att. providers found   [address] Day # 0 PCP Niurka Alford doing much better now. He has not had any new numbness, weakness or tingling. His memory fog and fatigue have greatly improved. He has no other new neurologic symptoms.      Current Medications:  Current Outpatient Medications   Medication Sig Dispense Refi abnormal glucose     Loss of peripheral vision   • OTHER DISEASES     hypogonadism   • Stephanie Burciaga   • Unspecified essential hypertension        PSHx:  Past Surgical History:   Procedure Laterality Date   • ARTHROSCOPY OF JOINT UNLISTED      b pinprick intact  LE: intact to light touch, pinprick intact.     COORDINATION:  No dysmetria, or intention tremors    REFLEXES: 2+ at biceps, 2+ triceps, 2+ at patella    GAIT: normal stance, normal gait           Labs:           Imaging:  MRI Brain 8/16/17 a h/o anxiety and depression. He repots that all of his symptoms including anger and poor sleep have resolved since starting clonazepam     Plan:  1.  Multiple Sclerosis  - continue Vumerity  - LP with >10 oligoclonal bands and MRI with evidence of demyelin

## 2021-10-26 NOTE — PATIENT INSTRUCTIONS
After your visit at the [facility] office today,  please direct any follow up questions or medication needs to the staff in our [place] office so that your concerns may be promptly addressed.   We are available through [employer] or at the numbers below: picked up in office. • Please allow the office 2-3 business days to fill the prescription. • Patient must present photo ID at time of . PLEASE NOTE: PRESCRIPTIONS MUST BE PICKED UP PRIOR TO 3:00PM MONDAY-FRIDAY    Scheduling Tests:     If your ph submitting forms to office staff. • Form completion may require an additional fee. • A signed Release of Information (MARLIN) must be on file before forms may be submitted. When dropping off forms, please ask the  for this paper.    • Failure

## 2021-11-26 ENCOUNTER — OFFICE VISIT (OUTPATIENT)
Dept: FAMILY MEDICINE CLINIC | Facility: CLINIC | Age: 51
End: 2021-11-26
Payer: COMMERCIAL

## 2021-11-26 ENCOUNTER — PATIENT MESSAGE (OUTPATIENT)
Dept: FAMILY MEDICINE CLINIC | Facility: CLINIC | Age: 51
End: 2021-11-26

## 2021-11-26 VITALS
WEIGHT: 233 LBS | TEMPERATURE: 98 F | HEART RATE: 80 BPM | HEIGHT: 66.5 IN | RESPIRATION RATE: 18 BRPM | BODY MASS INDEX: 37.01 KG/M2 | DIASTOLIC BLOOD PRESSURE: 88 MMHG | SYSTOLIC BLOOD PRESSURE: 120 MMHG

## 2021-11-26 DIAGNOSIS — E29.1 MALE HYPOGONADISM: ICD-10-CM

## 2021-11-26 DIAGNOSIS — E78.2 MIXED HYPERLIPIDEMIA: ICD-10-CM

## 2021-11-26 DIAGNOSIS — F41.1 GENERALIZED ANXIETY DISORDER: ICD-10-CM

## 2021-11-26 DIAGNOSIS — E03.8 HYPOTHYROIDISM DUE TO HASHIMOTO'S THYROIDITIS: ICD-10-CM

## 2021-11-26 DIAGNOSIS — I10 ESSENTIAL HYPERTENSION WITH GOAL BLOOD PRESSURE LESS THAN 130/80: Primary | ICD-10-CM

## 2021-11-26 DIAGNOSIS — E06.3 HYPOTHYROIDISM DUE TO HASHIMOTO'S THYROIDITIS: ICD-10-CM

## 2021-11-26 PROCEDURE — 3008F BODY MASS INDEX DOCD: CPT | Performed by: FAMILY MEDICINE

## 2021-11-26 PROCEDURE — 3074F SYST BP LT 130 MM HG: CPT | Performed by: FAMILY MEDICINE

## 2021-11-26 PROCEDURE — 99203 OFFICE O/P NEW LOW 30 MIN: CPT | Performed by: FAMILY MEDICINE

## 2021-11-26 PROCEDURE — 3079F DIAST BP 80-89 MM HG: CPT | Performed by: FAMILY MEDICINE

## 2021-11-26 RX ORDER — CLONAZEPAM 1 MG/1
1 TABLET ORAL NIGHTLY PRN
Qty: 30 TABLET | Refills: 2 | Status: SHIPPED | OUTPATIENT
Start: 2021-11-26

## 2021-11-26 RX ORDER — TRIAMTERENE AND HYDROCHLOROTHIAZIDE 37.5; 25 MG/1; MG/1
1 TABLET ORAL DAILY
COMMUNITY
Start: 2021-10-30

## 2021-11-29 NOTE — PROGRESS NOTES
[facility] Office Note  Chief Complaint:   Patient presents with:  Establish Care      HPI:   This is a 46year old male coming in for establishment of care for:    1. HTN - Patient presents for recheck of his hypertension.  Pt ha Comment: SOCIAL, 2-3 per week    Drug use: Yes      Types: Cannabis      Comment: has tried a few times recently    Family History:  Family History   Problem Relation Age of Onset   • Hypertension Father    • Lipids Father      Allergies:    Dust chills, weakness or fatigue. CARDIOVASCULAR:  Denies chest pain, chest pressure or chest discomfort. No palpitations or edema.   Denies any dyspnea on exertion or at rest  RESPIRATORY:  Denies shortness of breath, cough  GASTROINTESTINAL:  Denies any abdom counseling if no improvement  - clonazePAM 1 MG Oral Tab; Take 1 tablet (1 mg total) by mouth nightly as needed for Anxiety. Dispense: 30 tablet; Refill: 2    4.  Hypothyroidism due to Hashimoto's thyroiditis  -  Recheck TSH and adjust synthroid accordingl

## 2021-12-01 ENCOUNTER — LAB ENCOUNTER (OUTPATIENT)
Dept: LAB | Age: 51
End: 2021-12-01
Attending: FAMILY MEDICINE
Payer: COMMERCIAL

## 2021-12-01 DIAGNOSIS — E29.1 MALE HYPOGONADISM: ICD-10-CM

## 2021-12-01 DIAGNOSIS — G35 MULTIPLE SCLEROSIS (HCC): ICD-10-CM

## 2021-12-01 DIAGNOSIS — I10 ESSENTIAL HYPERTENSION WITH GOAL BLOOD PRESSURE LESS THAN 130/80: ICD-10-CM

## 2021-12-01 DIAGNOSIS — E06.3 HYPOTHYROIDISM DUE TO HASHIMOTO'S THYROIDITIS: ICD-10-CM

## 2021-12-01 DIAGNOSIS — E03.8 HYPOTHYROIDISM DUE TO HASHIMOTO'S THYROIDITIS: ICD-10-CM

## 2021-12-01 DIAGNOSIS — E78.2 MIXED HYPERLIPIDEMIA: ICD-10-CM

## 2021-12-01 PROCEDURE — 85027 COMPLETE CBC AUTOMATED: CPT | Performed by: OTHER

## 2021-12-01 PROCEDURE — 80053 COMPREHEN METABOLIC PANEL: CPT | Performed by: OTHER

## 2021-12-02 RX ORDER — LEVOTHYROXINE SODIUM 0.07 MG/1
75 TABLET ORAL
Qty: 90 TABLET | Refills: 1 | Status: SHIPPED | OUTPATIENT
Start: 2021-12-02

## 2021-12-02 NOTE — TELEPHONE ENCOUNTER
I can take over testosterone supplement however I only do testosterone IM. If he would like any other form of testosterone, I usually refer them to urology. I can also take over his thyroid medication.

## 2021-12-02 NOTE — TELEPHONE ENCOUNTER
See msg from pt. His testosterone was normal but on low end, any changes to his regimen? (new pt to you, previously rx'd by another pcp). Are you ok in taking over his rx for this? Please advise. rx for his thyroid already sent as requested by pt.

## 2021-12-03 RX ORDER — TESTOSTERONE CYPIONATE 200 MG/ML
INJECTION INTRAMUSCULAR
Refills: 0 | COMMUNITY
Start: 2021-12-03 | End: 2022-01-20

## 2021-12-27 DIAGNOSIS — G35 MULTIPLE SCLEROSIS (HCC): ICD-10-CM

## 2021-12-28 RX ORDER — DIROXIMEL FUMARATE 231 MG/1
CAPSULE ORAL
Qty: 120 CAPSULE | Refills: 2 | Status: SHIPPED | OUTPATIENT
Start: 2021-12-28

## 2021-12-28 NOTE — TELEPHONE ENCOUNTER
Medication: VUMERITY 231 MG Oral Capsule Delayed Release     Date of last refill: 10/18/2021 (#120/0)  [place]     Last office visit: 10/26/2021  Due back to clinic per last office note:  Around 04/26/2022  Date next

## 2022-01-10 ENCOUNTER — TELEPHONE (OUTPATIENT)
Dept: FAMILY MEDICINE CLINIC | Facility: CLINIC | Age: 52
End: 2022-01-10

## 2022-01-10 NOTE — TELEPHONE ENCOUNTER
Fax received from [facility] pharmacy for Testosterone Cypionate with Provider phone number [phone]. I called the number to have them fax a form but she took a PA over the phone.  She stated she will send request to the pharmacist and we will have an answer

## 2022-01-17 ENCOUNTER — MED REC SCAN ONLY (OUTPATIENT)
Dept: FAMILY MEDICINE CLINIC | Facility: CLINIC | Age: 52
End: 2022-01-17

## 2022-01-19 ENCOUNTER — TELEPHONE (OUTPATIENT)
Dept: FAMILY MEDICINE CLINIC | Facility: CLINIC | Age: 52
End: 2022-01-19

## 2022-01-19 PROBLEM — R79.89 LOW TESTOSTERONE IN MALE: Status: ACTIVE | Noted: 2022-01-19

## 2022-01-19 NOTE — TELEPHONE ENCOUNTER
Codes should be:  R79.89 (low testosterone)   E29.1 (male hypogonadism)    If these don't work, not sure why and what else to use.

## 2022-01-19 NOTE — TELEPHONE ENCOUNTER
Per 11/26/21 OV notes include male hypogonadism E29.1 as one of the visit dx-advised recheck testosterone level.     Only testosterone level in record ordered by dr el done 12/1/21 is 243.71-noted as low normal  **see initial call info-may need input from

## 2022-01-19 NOTE — TELEPHONE ENCOUNTER
Pt received letter from [address] [place] [address] stating his testosterone injections are being denied due to lack of evidence that pt needs these injections.      Pt states he has been doing these injections for 30 years and is wondering why it has been denied upon swi

## 2022-01-20 RX ORDER — TESTOSTERONE CYPIONATE 200 MG/ML
INJECTION INTRAMUSCULAR
Qty: 10 ML | Refills: 0 | Status: SHIPPED | OUTPATIENT
Start: 2022-01-20

## 2022-01-20 NOTE — TELEPHONE ENCOUNTER
Lm on vm to cb. Pt presents with c/o 4th toe injury on her right foot onset 3 days ago when she fell while attempting to get to her bed. Pt normally walks w/ walker, but did not use it at that time. Pt states she fell 2x 3 days ago, and is diaphoretic at triage, and states \"The doctors do not why I get sweaty. It has nothing to do with my diabetes.\" Pt is A&0x4 at triage. Pt denies head, or neck pain, but states she also has bi-lateral flank pain d/t the falls. Denies n,v,d.   Pt states the 2nd fall occurred when her dog \"lurched\" forward on it's leash, pulling her walker from her. Pt also states hx of htn and has not taken her htn meds for 3 days.

## 2022-01-20 NOTE — TELEPHONE ENCOUNTER
Pt called back. Before I even advised of below he said [facility] is [facility] but have not rec'd any rx for it. [facility]   Also needs syringes. Using terumo 3cc locking ones. I pended rx/syringes if you agree.   Than

## 2022-03-09 ENCOUNTER — HOSPITAL ENCOUNTER (OUTPATIENT)
Dept: CT IMAGING | Age: 52
Discharge: HOME | End: 2022-03-09
Attending: FAMILY MEDICINE

## 2022-03-09 DIAGNOSIS — Z13.6 SCREENING FOR HEART DISEASE: ICD-10-CM

## 2022-03-09 RX ORDER — CLONAZEPAM 1 MG/1
1 TABLET ORAL NIGHTLY PRN
Qty: 30 TABLET | Refills: 2 | Status: SHIPPED | OUTPATIENT
Start: 2022-03-09

## 2022-03-18 ENCOUNTER — TELEPHONE (OUTPATIENT)
Dept: NEUROLOGY | Facility: CLINIC | Age: 52
End: 2022-03-18

## 2022-03-18 DIAGNOSIS — G35 MULTIPLE SCLEROSIS (HCC): ICD-10-CM

## 2022-03-18 RX ORDER — DIROXIMEL FUMARATE 231 MG/1
2 CAPSULE ORAL 2 TIMES DAILY
Qty: 120 CAPSULE | Refills: 2 | Status: CANCELLED | OUTPATIENT
Start: 2022-03-18

## 2022-03-18 NOTE — TELEPHONE ENCOUNTER
Patient reported that [facility] is requiring a new specialty pharmacy ([facility]) to be used. Per Epic review, last PA was not updated with approval dates from 8/26/2021 to 08/26/2022. RN faxed new PA since going to new specialty pharmacy. Pended new RX to be sent by provider and routed to provider.

## 2022-04-18 ENCOUNTER — MED REC SCAN ONLY (OUTPATIENT)
Dept: FAMILY MEDICINE CLINIC | Facility: CLINIC | Age: 52
End: 2022-04-18

## 2022-04-26 ENCOUNTER — OFFICE VISIT (OUTPATIENT)
Dept: NEUROLOGY | Facility: CLINIC | Age: 52
End: 2022-04-26
Payer: COMMERCIAL

## 2022-04-26 VITALS
WEIGHT: 197 LBS | BODY MASS INDEX: 31 KG/M2 | SYSTOLIC BLOOD PRESSURE: 112 MMHG | HEART RATE: 76 BPM | RESPIRATION RATE: 16 BRPM | DIASTOLIC BLOOD PRESSURE: 68 MMHG

## 2022-04-26 DIAGNOSIS — G35 MULTIPLE SCLEROSIS (HCC): Primary | ICD-10-CM

## 2022-04-26 PROCEDURE — 3078F DIAST BP <80 MM HG: CPT | Performed by: OTHER

## 2022-04-26 PROCEDURE — 99213 OFFICE O/P EST LOW 20 MIN: CPT | Performed by: OTHER

## 2022-04-26 PROCEDURE — 3074F SYST BP LT 130 MM HG: CPT | Performed by: OTHER

## 2022-04-26 RX ORDER — DOXYCYCLINE HYCLATE 100 MG
TABLET ORAL
COMMUNITY
Start: 2022-04-13 | End: 2022-04-26 | Stop reason: ALTCHOICE

## 2022-04-26 RX ORDER — ONDANSETRON 4 MG/1
TABLET, ORALLY DISINTEGRATING ORAL
COMMUNITY
Start: 2022-04-10

## 2022-04-26 RX ORDER — DICYCLOMINE HCL 20 MG
TABLET ORAL
COMMUNITY
Start: 2022-04-10 | End: 2022-04-26 | Stop reason: ALTCHOICE

## 2022-04-26 RX ORDER — SUCRALFATE 1 G/1
1 TABLET ORAL 3 TIMES DAILY
COMMUNITY
Start: 2022-04-10

## 2022-05-02 RX ORDER — TESTOSTERONE CYPIONATE 200 MG/ML
INJECTION INTRAMUSCULAR
Qty: 6 ML | Refills: 0 | Status: SHIPPED | OUTPATIENT
Start: 2022-05-02

## 2022-05-06 RX ORDER — DIROXIMEL FUMARATE 231 MG/1
2 CAPSULE ORAL 2 TIMES DAILY
Qty: 360 CAPSULE | Refills: 1 | Status: SHIPPED | OUTPATIENT
Start: 2022-05-06

## 2022-05-06 NOTE — TELEPHONE ENCOUNTER
Medication: Vumerity     Date of last refill: 12/28/21 (#120/2)  [place]      Last office visit: 4/26/2022  Due back to clinic per last office note:  6 months  Date next office visit scheduled:    Future Appointments   Date Time Provider [place]   6/8/2022  1:00 PM Gerardo Kelley DO [facility]   10/25/2022 11:00 AM [facility] [address] [place] [address] note recommendation:    1. Multiple Sclerosis  - continue Vumerity  - LP with >10 oligoclonal bands and MRI with evidence of demyelinating disease  - LFTs are stable and last done in 10/2021  - RTC in 6 months  - MRI brain and C spine reviewed. No new lesions.  Only minimal progression since previous imaging in 2017

## 2022-05-18 ENCOUNTER — MED REC SCAN ONLY (OUTPATIENT)
Dept: FAMILY MEDICINE CLINIC | Facility: CLINIC | Age: 52
End: 2022-05-18

## 2022-05-26 RX ORDER — LEVOTHYROXINE SODIUM 0.07 MG/1
75 TABLET ORAL
Qty: 30 TABLET | Refills: 0 | Status: SHIPPED | OUTPATIENT
Start: 2022-05-26 | End: 2022-06-13

## 2022-06-08 ENCOUNTER — PATIENT MESSAGE (OUTPATIENT)
Dept: FAMILY MEDICINE CLINIC | Facility: CLINIC | Age: 52
End: 2022-06-08

## 2022-06-08 ENCOUNTER — OFFICE VISIT (OUTPATIENT)
Dept: FAMILY MEDICINE CLINIC | Facility: CLINIC | Age: 52
End: 2022-06-08
Payer: COMMERCIAL

## 2022-06-08 ENCOUNTER — LAB ENCOUNTER (OUTPATIENT)
Dept: LAB | Age: 52
End: 2022-06-08
Attending: FAMILY MEDICINE
Payer: COMMERCIAL

## 2022-06-08 VITALS
TEMPERATURE: 97 F | SYSTOLIC BLOOD PRESSURE: 124 MMHG | DIASTOLIC BLOOD PRESSURE: 82 MMHG | HEART RATE: 68 BPM | HEIGHT: 66.5 IN | WEIGHT: 206 LBS | RESPIRATION RATE: 16 BRPM | BODY MASS INDEX: 32.72 KG/M2

## 2022-06-08 DIAGNOSIS — E78.2 MIXED HYPERLIPIDEMIA: ICD-10-CM

## 2022-06-08 DIAGNOSIS — E03.8 HYPOTHYROIDISM DUE TO HASHIMOTO'S THYROIDITIS: ICD-10-CM

## 2022-06-08 DIAGNOSIS — H93.8X2 PRESSURE SENSATION IN LEFT EAR: ICD-10-CM

## 2022-06-08 DIAGNOSIS — I10 ESSENTIAL HYPERTENSION WITH GOAL BLOOD PRESSURE LESS THAN 130/80: ICD-10-CM

## 2022-06-08 DIAGNOSIS — Z00.00 ROUTINE GENERAL MEDICAL EXAMINATION AT A HEALTH CARE FACILITY: Primary | ICD-10-CM

## 2022-06-08 DIAGNOSIS — Z00.00 ROUTINE GENERAL MEDICAL EXAMINATION AT A HEALTH CARE FACILITY: ICD-10-CM

## 2022-06-08 DIAGNOSIS — E06.3 HYPOTHYROIDISM DUE TO HASHIMOTO'S THYROIDITIS: ICD-10-CM

## 2022-06-08 DIAGNOSIS — J30.1 ALLERGIC RHINITIS DUE TO POLLEN: ICD-10-CM

## 2022-06-08 DIAGNOSIS — Z12.11 COLON CANCER SCREENING: ICD-10-CM

## 2022-06-08 LAB
ALBUMIN SERPL-MCNC: 3.7 G/DL (ref 3.4–5)
ALBUMIN/GLOB SERPL: 1.1 {RATIO} (ref 1–2)
ALP LIVER SERPL-CCNC: 59 U/L
ALT SERPL-CCNC: 30 U/L
ANION GAP SERPL CALC-SCNC: 5 MMOL/L (ref 0–18)
AST SERPL-CCNC: 20 U/L (ref 15–37)
BASOPHILS # BLD AUTO: 0.07 X10(3) UL (ref 0–0.2)
BASOPHILS NFR BLD AUTO: 1.2 %
BILIRUB SERPL-MCNC: 0.5 MG/DL (ref 0.1–2)
BILIRUB UR QL STRIP.AUTO: NEGATIVE
BUN BLD-MCNC: 27 MG/DL (ref 7–18)
CALCIUM BLD-MCNC: 9 MG/DL (ref 8.5–10.1)
CHLORIDE SERPL-SCNC: 113 MMOL/L (ref 98–112)
CHOLEST SERPL-MCNC: 175 MG/DL (ref ?–200)
CLARITY UR REFRACT.AUTO: CLEAR
CO2 SERPL-SCNC: 25 MMOL/L (ref 21–32)
COLOR UR AUTO: YELLOW
COMPLEXED PSA SERPL-MCNC: 0.43 NG/ML (ref ?–4)
CREAT BLD-MCNC: 1.13 MG/DL
EOSINOPHIL # BLD AUTO: 0.12 X10(3) UL (ref 0–0.7)
EOSINOPHIL NFR BLD AUTO: 2 %
ERYTHROCYTE [DISTWIDTH] IN BLOOD BY AUTOMATED COUNT: 14.8 %
FASTING PATIENT LIPID ANSWER: NO
FASTING STATUS PATIENT QL REPORTED: NO
GLOBULIN PLAS-MCNC: 3.5 G/DL (ref 2.8–4.4)
GLUCOSE BLD-MCNC: 94 MG/DL (ref 70–99)
GLUCOSE UR STRIP.AUTO-MCNC: NEGATIVE MG/DL
HCT VFR BLD AUTO: 47.6 %
HDLC SERPL-MCNC: 56 MG/DL (ref 40–59)
HGB BLD-MCNC: 15.8 G/DL
IMM GRANULOCYTES # BLD AUTO: 0.07 X10(3) UL (ref 0–1)
IMM GRANULOCYTES NFR BLD: 1.2 %
KETONES UR STRIP.AUTO-MCNC: NEGATIVE MG/DL
LDLC SERPL CALC-MCNC: 98 MG/DL (ref ?–100)
LEUKOCYTE ESTERASE UR QL STRIP.AUTO: NEGATIVE
LYMPHOCYTES # BLD AUTO: 0.5 X10(3) UL (ref 1–4)
LYMPHOCYTES NFR BLD AUTO: 8.3 %
MCH RBC QN AUTO: 30.3 PG (ref 26–34)
MCHC RBC AUTO-ENTMCNC: 33.2 G/DL (ref 31–37)
MCV RBC AUTO: 91.2 FL
MONOCYTES # BLD AUTO: 0.5 X10(3) UL (ref 0.1–1)
MONOCYTES NFR BLD AUTO: 8.3 %
NEUTROPHILS # BLD AUTO: 4.73 X10 (3) UL (ref 1.5–7.7)
NEUTROPHILS # BLD AUTO: 4.73 X10(3) UL (ref 1.5–7.7)
NEUTROPHILS NFR BLD AUTO: 79 %
NITRITE UR QL STRIP.AUTO: NEGATIVE
NONHDLC SERPL-MCNC: 119 MG/DL (ref ?–130)
OSMOLALITY SERPL CALC.SUM OF ELEC: 301 MOSM/KG (ref 275–295)
PH UR STRIP.AUTO: 5 [PH] (ref 5–8)
PLATELET # BLD AUTO: 194 10(3)UL (ref 150–450)
POTASSIUM SERPL-SCNC: 4 MMOL/L (ref 3.5–5.1)
PROT SERPL-MCNC: 7.2 G/DL (ref 6.4–8.2)
PROT UR STRIP.AUTO-MCNC: NEGATIVE MG/DL
RBC # BLD AUTO: 5.22 X10(6)UL
RBC UR QL AUTO: NEGATIVE
SODIUM SERPL-SCNC: 143 MMOL/L (ref 136–145)
SP GR UR STRIP.AUTO: 1.02 (ref 1–1.03)
T4 FREE SERPL-MCNC: 1.1 NG/DL (ref 0.8–1.7)
TRIGL SERPL-MCNC: 116 MG/DL (ref 30–149)
TSI SER-ACNC: 1.33 MIU/ML (ref 0.36–3.74)
UROBILINOGEN UR STRIP.AUTO-MCNC: <2 MG/DL
VLDLC SERPL CALC-MCNC: 19 MG/DL (ref 0–30)
WBC # BLD AUTO: 6 X10(3) UL (ref 4–11)

## 2022-06-08 PROCEDURE — 3074F SYST BP LT 130 MM HG: CPT | Performed by: FAMILY MEDICINE

## 2022-06-08 PROCEDURE — 3008F BODY MASS INDEX DOCD: CPT | Performed by: FAMILY MEDICINE

## 2022-06-08 PROCEDURE — 84443 ASSAY THYROID STIM HORMONE: CPT

## 2022-06-08 PROCEDURE — 85025 COMPLETE CBC W/AUTO DIFF WBC: CPT

## 2022-06-08 PROCEDURE — 84439 ASSAY OF FREE THYROXINE: CPT

## 2022-06-08 PROCEDURE — 80053 COMPREHEN METABOLIC PANEL: CPT

## 2022-06-08 PROCEDURE — 81003 URINALYSIS AUTO W/O SCOPE: CPT

## 2022-06-08 PROCEDURE — 99396 PREV VISIT EST AGE 40-64: CPT | Performed by: FAMILY MEDICINE

## 2022-06-08 PROCEDURE — 3079F DIAST BP 80-89 MM HG: CPT | Performed by: FAMILY MEDICINE

## 2022-06-08 PROCEDURE — 99213 OFFICE O/P EST LOW 20 MIN: CPT | Performed by: FAMILY MEDICINE

## 2022-06-08 PROCEDURE — 80061 LIPID PANEL: CPT

## 2022-06-08 RX ORDER — TRIAMTERENE AND HYDROCHLOROTHIAZIDE 37.5; 25 MG/1; MG/1
1 TABLET ORAL DAILY
Qty: 90 TABLET | Refills: 1 | Status: SHIPPED | OUTPATIENT
Start: 2022-06-08

## 2022-06-08 RX ORDER — ATORVASTATIN CALCIUM 40 MG/1
40 TABLET, FILM COATED ORAL DAILY
Qty: 90 TABLET | Refills: 1 | Status: SHIPPED | OUTPATIENT
Start: 2022-06-08

## 2022-06-08 RX ORDER — AMLODIPINE BESYLATE 10 MG/1
10 TABLET ORAL EVERY EVENING
Qty: 90 TABLET | Refills: 1 | Status: SHIPPED | OUTPATIENT
Start: 2022-06-08

## 2022-06-08 RX ORDER — METHYLPREDNISOLONE 4 MG/1
TABLET ORAL
Qty: 1 EACH | Refills: 0 | Status: SHIPPED | OUTPATIENT
Start: 2022-06-08

## 2022-06-08 RX ORDER — AZELASTINE 1 MG/ML
1 SPRAY, METERED NASAL DAILY
Qty: 3 EACH | Refills: 1 | Status: SHIPPED | OUTPATIENT
Start: 2022-06-08

## 2022-06-09 PROBLEM — K29.50 MILD CHRONIC GASTRITIS: Status: ACTIVE | Noted: 2022-06-09

## 2022-06-09 PROBLEM — D36.9 TUBULAR ADENOMA: Status: ACTIVE | Noted: 2022-06-09

## 2022-06-09 PROBLEM — Z86.010 HISTORY OF COLONIC POLYPS: Status: ACTIVE | Noted: 2019-02-21

## 2022-06-09 PROBLEM — Z86.0100 HISTORY OF COLONIC POLYPS: Status: ACTIVE | Noted: 2019-02-21

## 2022-06-09 NOTE — TELEPHONE ENCOUNTER
For some strange reason not in care-everywhere. Colonoscopy and gastroscopy/paths done. Multiple polyps/adenoma found, did say when to repeat. Copies at [facility]. Cole Johnson

## 2022-06-09 NOTE — TELEPHONE ENCOUNTER
From: Tatianna Demarco  To: [facility]  Sent: 6/8/2022 4:31 PM CDT  Subject: test results     Results from 2019. Please see attached 3 documents.

## 2022-06-12 ENCOUNTER — PATIENT MESSAGE (OUTPATIENT)
Dept: FAMILY MEDICINE CLINIC | Facility: CLINIC | Age: 52
End: 2022-06-12

## 2022-06-12 DIAGNOSIS — H93.8X2 PRESSURE SENSATION IN LEFT EAR: Primary | ICD-10-CM

## 2022-06-13 ENCOUNTER — TELEPHONE (OUTPATIENT)
Dept: FAMILY MEDICINE CLINIC | Facility: CLINIC | Age: 52
End: 2022-06-13

## 2022-06-13 DIAGNOSIS — F41.1 GENERALIZED ANXIETY DISORDER: ICD-10-CM

## 2022-06-13 RX ORDER — CLONAZEPAM 1 MG/1
1 TABLET ORAL NIGHTLY PRN
Qty: 30 TABLET | Refills: 2 | Status: SHIPPED | OUTPATIENT
Start: 2022-06-13

## 2022-06-13 RX ORDER — LEVOTHYROXINE SODIUM 75 UG/1
TABLET ORAL
Qty: 90 TABLET | Refills: 0 | Status: SHIPPED | OUTPATIENT
Start: 2022-06-13

## 2022-06-13 NOTE — TELEPHONE ENCOUNTER
Pt was prescribed methylPREDNISolone (MEDROL) 4 MG Oral Tablet Therapy Pack. Pt states he was advised by Dr Navdeep Patel to follow up if ear problem continued. Pt states there is no difference in his hearing, still feels like he is underwater/like his ear needs to pop. 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 Pt wondering what his next steps should be? Pt noted that brother thought it may be BPPV.     Please advise
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
## 2022-06-16 ENCOUNTER — MED REC SCAN ONLY (OUTPATIENT)
Dept: FAMILY MEDICINE CLINIC | Facility: CLINIC | Age: 52
End: 2022-06-16

## 2022-06-16 ENCOUNTER — PATIENT MESSAGE (OUTPATIENT)
Dept: FAMILY MEDICINE CLINIC | Facility: CLINIC | Age: 52
End: 2022-06-16
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
## 2022-06-17 PROBLEM — E66.01 MORBID OBESITY (HCC): Status: ACTIVE | Noted: 2018-03-21

## 2022-06-17 PROBLEM — K57.30 DIVERTICULOSIS OF COLON: Status: ACTIVE | Noted: 2019-02-21

## 2022-06-17 PROBLEM — G35 RELAPSING REMITTING MULTIPLE SCLEROSIS (HCC): Status: ACTIVE | Noted: 2018-03-21

## 2022-06-17 PROBLEM — Z00.00 WELL ADULT HEALTH CHECK: Status: ACTIVE | Noted: 2018-09-10

## 2022-06-17 PROBLEM — I45.10 INCOMPLETE RIGHT BUNDLE BRANCH BLOCK: Status: ACTIVE | Noted: 2018-10-08

## 2022-06-17 PROBLEM — K64.8 INTERNAL HEMORRHOIDS: Status: ACTIVE | Noted: 2019-02-21

## 2022-07-27 DIAGNOSIS — R79.89 LOW TESTOSTERONE IN MALE: ICD-10-CM

## 2022-07-27 DIAGNOSIS — E29.1 MALE HYPOGONADISM: ICD-10-CM

## 2022-07-28 RX ORDER — TESTOSTERONE CYPIONATE 200 MG/ML
INJECTION INTRAMUSCULAR
Qty: 6 ML | Refills: 0 | Status: SHIPPED | OUTPATIENT
Start: 2022-07-28

## 2022-07-28 NOTE — TELEPHONE ENCOUNTER
Please see pended medication. Please sign and approved if appropriate       Last ov 6-8-22      Last refill 5-2-22   0 refill      The patient is asked to return in 6 months pending lab results.         testosterone cypionate 200 MG/ML Intramuscular Solution

## 2022-09-13 ENCOUNTER — TELEPHONE (OUTPATIENT)
Dept: NEUROLOGY | Facility: CLINIC | Age: 52
End: 2022-09-13

## 2022-09-13 NOTE — TELEPHONE ENCOUNTER
Provider call schedule changed and patient will need to be r/s from 10/25/2022 appt. Can offer 09/20/2022 at [facility] or 9/27/2022 at 1040 (double book). LMTCB.

## 2022-09-19 ENCOUNTER — TELEPHONE (OUTPATIENT)
Dept: FAMILY MEDICINE CLINIC | Facility: CLINIC | Age: 52
End: 2022-09-19

## 2022-09-19 NOTE — TELEPHONE ENCOUNTER
S/W pt regarding below. States he has had 2 pimple like bump on the R upper lid and in the inner area. States filled with whitish fluid. And redness around it. Denies any swelling and pain. It occasional swells up. Has applied warm compresses and goes down a bit, but does not go away. States it has been about 3 months.

## 2022-09-19 NOTE — TELEPHONE ENCOUNTER
Pt calling requesting advise on if an OV is advised at this time. Pt states he has been experiencing stye(s) on his eye on/off for the past 3 months. Pt states they look like pimples, some appear inside the eye while other are on the outside. Pt notes no pain or irritation, however does cause eye to become swollen at times. Pt notes has had anywhere from 1 - 4 present at one time. Pt states has been applying warm compress as advised by Internet, however he has never had a stye prior to these past 3 months so pt is very unsure how to proceed in treating. Asked if pt currently using any OTC creams, pt denies this as he was unaware that this was an option.     Please advise

## 2022-09-21 ENCOUNTER — OFFICE VISIT (OUTPATIENT)
Dept: FAMILY MEDICINE CLINIC | Facility: CLINIC | Age: 52
End: 2022-09-21

## 2022-09-21 VITALS
WEIGHT: 207 LBS | TEMPERATURE: 98 F | BODY MASS INDEX: 32.49 KG/M2 | DIASTOLIC BLOOD PRESSURE: 84 MMHG | HEART RATE: 78 BPM | SYSTOLIC BLOOD PRESSURE: 130 MMHG | RESPIRATION RATE: 14 BRPM | HEIGHT: 67 IN

## 2022-09-21 DIAGNOSIS — H00.011 HORDEOLUM EXTERNUM OF RIGHT UPPER EYELID: Primary | ICD-10-CM

## 2022-09-21 PROCEDURE — 3079F DIAST BP 80-89 MM HG: CPT | Performed by: FAMILY MEDICINE

## 2022-09-21 PROCEDURE — 99214 OFFICE O/P EST MOD 30 MIN: CPT | Performed by: FAMILY MEDICINE

## 2022-09-21 PROCEDURE — 3008F BODY MASS INDEX DOCD: CPT | Performed by: FAMILY MEDICINE

## 2022-09-21 PROCEDURE — 3075F SYST BP GE 130 - 139MM HG: CPT | Performed by: FAMILY MEDICINE

## 2022-09-21 RX ORDER — POLYMYXIN B SULFATE AND TRIMETHOPRIM 1; 10000 MG/ML; [USP'U]/ML
1 SOLUTION OPHTHALMIC EVERY 4 HOURS
Qty: 10 ML | Refills: 0 | Status: SHIPPED | OUTPATIENT
Start: 2022-09-21 | End: 2022-09-28

## 2022-09-24 DIAGNOSIS — F41.1 GENERALIZED ANXIETY DISORDER: ICD-10-CM

## 2022-09-24 DIAGNOSIS — E06.3 HYPOTHYROIDISM DUE TO HASHIMOTO'S THYROIDITIS: Primary | ICD-10-CM

## 2022-09-24 DIAGNOSIS — E03.8 HYPOTHYROIDISM DUE TO HASHIMOTO'S THYROIDITIS: Primary | ICD-10-CM

## 2022-09-26 RX ORDER — CLONAZEPAM 1 MG/1
TABLET ORAL
Qty: 30 TABLET | Refills: 2 | Status: SHIPPED | OUTPATIENT
Start: 2022-09-26

## 2022-09-26 RX ORDER — LEVOTHYROXINE SODIUM 0.07 MG/1
TABLET ORAL
Qty: 90 TABLET | Refills: 0 | Status: SHIPPED | OUTPATIENT
Start: 2022-09-26

## 2022-09-27 ENCOUNTER — TELEPHONE (OUTPATIENT)
Dept: FAMILY MEDICINE CLINIC | Facility: CLINIC | Age: 52
End: 2022-09-27

## 2022-09-27 DIAGNOSIS — H00.011 HORDEOLUM EXTERNUM OF RIGHT UPPER EYELID: ICD-10-CM

## 2022-09-28 RX ORDER — POLYMYXIN B SULFATE AND TRIMETHOPRIM 1; 10000 MG/ML; [USP'U]/ML
1 SOLUTION OPHTHALMIC EVERY 4 HOURS
Qty: 10 ML | Refills: 0 | Status: SHIPPED | OUTPATIENT
Start: 2022-09-28 | End: 2022-10-05

## 2022-09-28 NOTE — TELEPHONE ENCOUNTER
Pt informed of below and he voiced understanding. RX sent and pt given Dr. Timothy Knapp below.     [address]  [place], [address]  [phone]

## 2022-10-21 DIAGNOSIS — G35 MULTIPLE SCLEROSIS (HCC): ICD-10-CM

## 2022-10-21 RX ORDER — DIROXIMEL FUMARATE 231 MG/1
2 CAPSULE ORAL 2 TIMES DAILY
Qty: 360 CAPSULE | Refills: 0 | Status: SHIPPED | OUTPATIENT
Start: 2022-10-21

## 2022-10-21 NOTE — TELEPHONE ENCOUNTER
Medication: Vumerity     Date of last refill: 5/6/2022 (#360/1)  [place]      Last office visit: 4/26/2022  Due back to clinic per last office note:  6 months  Date next office visit scheduled:    Future Appointments   Date Time Provider [place]   1/3/2023  2:20 PM Saintclair Man, DO [facility] note recommendation:    1. Multiple Sclerosis  - continue Vumerity  - LP with >10 oligoclonal bands and MRI with evidence of demyelinating disease  - LFTs are stable and last done in 10/2021  - RTC in 6 months  - MRI brain and C spine reviewed. No new lesions. Only minimal progression since previous imaging in 2017   Return in about 6 months (around 10/26/2022).

## 2022-10-27 DIAGNOSIS — R79.89 LOW TESTOSTERONE IN MALE: ICD-10-CM

## 2022-10-27 DIAGNOSIS — E29.1 MALE HYPOGONADISM: ICD-10-CM

## 2022-10-28 NOTE — TELEPHONE ENCOUNTER
Do you want this lab re-checked? Last checked 12/21    Saw you in June for px. I pended lab order if you agree?

## 2022-10-29 RX ORDER — TESTOSTERONE CYPIONATE 200 MG/ML
INJECTION INTRAMUSCULAR
Qty: 6 ML | Refills: 0 | Status: SHIPPED | OUTPATIENT
Start: 2022-10-29

## 2022-11-14 ENCOUNTER — OFFICE VISIT (OUTPATIENT)
Dept: FAMILY MEDICINE CLINIC | Facility: CLINIC | Age: 52
End: 2022-11-14
Payer: COMMERCIAL

## 2022-11-14 VITALS
BODY MASS INDEX: 33.27 KG/M2 | DIASTOLIC BLOOD PRESSURE: 92 MMHG | HEART RATE: 78 BPM | WEIGHT: 212 LBS | TEMPERATURE: 97 F | HEIGHT: 67 IN | RESPIRATION RATE: 14 BRPM | SYSTOLIC BLOOD PRESSURE: 130 MMHG

## 2022-11-14 DIAGNOSIS — J01.00 ACUTE NON-RECURRENT MAXILLARY SINUSITIS: Primary | ICD-10-CM

## 2022-11-14 PROCEDURE — 3075F SYST BP GE 130 - 139MM HG: CPT | Performed by: FAMILY MEDICINE

## 2022-11-14 PROCEDURE — 99214 OFFICE O/P EST MOD 30 MIN: CPT | Performed by: FAMILY MEDICINE

## 2022-11-14 PROCEDURE — 3008F BODY MASS INDEX DOCD: CPT | Performed by: FAMILY MEDICINE

## 2022-11-14 PROCEDURE — 3080F DIAST BP >= 90 MM HG: CPT | Performed by: FAMILY MEDICINE

## 2022-11-14 RX ORDER — DOXYCYCLINE HYCLATE 100 MG
100 TABLET ORAL 2 TIMES DAILY
Qty: 20 TABLET | Refills: 0 | Status: SHIPPED | OUTPATIENT
Start: 2022-11-14 | End: 2022-11-24

## 2022-11-19 DIAGNOSIS — I10 ESSENTIAL HYPERTENSION WITH GOAL BLOOD PRESSURE LESS THAN 130/80: ICD-10-CM

## 2022-11-21 RX ORDER — TRIAMTERENE AND HYDROCHLOROTHIAZIDE 37.5; 25 MG/1; MG/1
TABLET ORAL
Qty: 90 TABLET | Refills: 0 | Status: SHIPPED | OUTPATIENT
Start: 2022-11-21

## 2022-12-13 ENCOUNTER — OFFICE VISIT (OUTPATIENT)
Dept: FAMILY MEDICINE CLINIC | Facility: CLINIC | Age: 52
End: 2022-12-13
Payer: COMMERCIAL

## 2022-12-13 ENCOUNTER — PATIENT MESSAGE (OUTPATIENT)
Dept: FAMILY MEDICINE CLINIC | Facility: CLINIC | Age: 52
End: 2022-12-13

## 2022-12-13 VITALS
DIASTOLIC BLOOD PRESSURE: 80 MMHG | HEART RATE: 89 BPM | BODY MASS INDEX: 31.83 KG/M2 | OXYGEN SATURATION: 96 % | SYSTOLIC BLOOD PRESSURE: 145 MMHG | WEIGHT: 210 LBS | HEIGHT: 68 IN | TEMPERATURE: 98 F | RESPIRATION RATE: 16 BRPM

## 2022-12-13 DIAGNOSIS — J11.1 INFLUENZA-LIKE ILLNESS: Primary | ICD-10-CM

## 2022-12-13 PROCEDURE — 3079F DIAST BP 80-89 MM HG: CPT | Performed by: NURSE PRACTITIONER

## 2022-12-13 PROCEDURE — 3008F BODY MASS INDEX DOCD: CPT | Performed by: NURSE PRACTITIONER

## 2022-12-13 PROCEDURE — 99213 OFFICE O/P EST LOW 20 MIN: CPT | Performed by: NURSE PRACTITIONER

## 2022-12-13 PROCEDURE — 3077F SYST BP >= 140 MM HG: CPT | Performed by: NURSE PRACTITIONER

## 2022-12-13 RX ORDER — OSELTAMIVIR PHOSPHATE 75 MG/1
75 CAPSULE ORAL 2 TIMES DAILY
Qty: 10 CAPSULE | Refills: 0 | Status: SHIPPED | OUTPATIENT
Start: 2022-12-13 | End: 2022-12-13

## 2022-12-13 RX ORDER — OSELTAMIVIR PHOSPHATE 75 MG/1
75 CAPSULE ORAL 2 TIMES DAILY
Qty: 10 CAPSULE | Refills: 0 | Status: SHIPPED | OUTPATIENT
Start: 2022-12-13 | End: 2022-12-18

## 2022-12-26 DIAGNOSIS — I10 ESSENTIAL HYPERTENSION WITH GOAL BLOOD PRESSURE LESS THAN 130/80: ICD-10-CM

## 2022-12-26 DIAGNOSIS — E03.8 HYPOTHYROIDISM DUE TO HASHIMOTO'S THYROIDITIS: ICD-10-CM

## 2022-12-26 DIAGNOSIS — E06.3 HYPOTHYROIDISM DUE TO HASHIMOTO'S THYROIDITIS: ICD-10-CM

## 2022-12-26 DIAGNOSIS — F41.1 GENERALIZED ANXIETY DISORDER: ICD-10-CM

## 2022-12-27 RX ORDER — LEVOTHYROXINE SODIUM 0.07 MG/1
TABLET ORAL
Qty: 30 TABLET | Refills: 0 | Status: SHIPPED | OUTPATIENT
Start: 2022-12-27

## 2022-12-27 RX ORDER — TRIAMTERENE AND HYDROCHLOROTHIAZIDE 37.5; 25 MG/1; MG/1
TABLET ORAL
Qty: 90 TABLET | Refills: 0 | OUTPATIENT
Start: 2022-12-27

## 2022-12-27 RX ORDER — CLONAZEPAM 1 MG/1
TABLET ORAL
Qty: 30 TABLET | Refills: 0 | Status: SHIPPED | OUTPATIENT
Start: 2022-12-27

## 2023-01-23 DIAGNOSIS — E03.8 HYPOTHYROIDISM DUE TO HASHIMOTO'S THYROIDITIS: ICD-10-CM

## 2023-01-23 DIAGNOSIS — E06.3 HYPOTHYROIDISM DUE TO HASHIMOTO'S THYROIDITIS: ICD-10-CM

## 2023-01-23 DIAGNOSIS — F41.1 GENERALIZED ANXIETY DISORDER: ICD-10-CM

## 2023-01-23 DIAGNOSIS — E03.4 HYPOTHYROIDISM DUE TO ACQUIRED ATROPHY OF THYROID: Primary | ICD-10-CM

## 2023-01-23 RX ORDER — LEVOTHYROXINE SODIUM 0.07 MG/1
TABLET ORAL
Qty: 90 TABLET | Refills: 0 | Status: SHIPPED | OUTPATIENT
Start: 2023-01-23

## 2023-01-23 NOTE — TELEPHONE ENCOUNTER
CLONAZEPAM 1 MG TABLET      Please see pended medications. Please sign if appropriate.       Thank you      Last OV: 06/08/2022      Last refill: 12/27/2022 for 30 tabs      Upcoming appt is scheduled for 01/30/2023

## 2023-01-23 NOTE — TELEPHONE ENCOUNTER
Pt wants 90 tabs, please MCM. Do you want to recheck TSH level? Last TSH was 6/8/22.       Last office visit: Annual Physical 6/8/22  Last refill was: 12/27, 30 tabs  Next appointment: 1/30/23    Please sign pended lab/medication if appropriate

## 2023-01-24 DIAGNOSIS — G35 MULTIPLE SCLEROSIS (HCC): ICD-10-CM

## 2023-01-24 RX ORDER — DIROXIMEL FUMARATE 231 MG/1
2 CAPSULE ORAL 2 TIMES DAILY
Qty: 360 CAPSULE | Refills: 0 | Status: SHIPPED | OUTPATIENT
Start: 2023-01-24

## 2023-01-24 NOTE — TELEPHONE ENCOUNTER
Stella't scheduled 5/31 and patient informed to have blood work done. Medication: Vumerity     Date of last refill: 10/21/2022 (#360/0)  [place]      Last office visit: 4/26/2022  Due back to clinic per last office note:  6 months  Date next office visit scheduled:    Future Appointments   Date Time Provider [place]   1/30/2023  5:00 PM Humaira De Leon, DO  [facility] note recommendation:    1. Multiple Sclerosis  - continue Vumerity  - LP with >10 oligoclonal bands and MRI with evidence of demyelinating disease  - LFTs are stable and last done in 10/2021  - RTC in 6 months  - MRI brain and C spine reviewed. No new lesions.  Only minimal progression since previous imaging in 2017

## 2023-01-25 RX ORDER — CLONAZEPAM 1 MG/1
TABLET ORAL
Qty: 30 TABLET | Refills: 0 | Status: SHIPPED | OUTPATIENT
Start: 2023-01-25

## 2023-01-27 ENCOUNTER — LAB ENCOUNTER (OUTPATIENT)
Dept: LAB | Age: 53
End: 2023-01-27
Attending: FAMILY MEDICINE
Payer: COMMERCIAL

## 2023-01-27 DIAGNOSIS — G35 MULTIPLE SCLEROSIS (HCC): ICD-10-CM

## 2023-01-27 DIAGNOSIS — E06.3 HYPOTHYROIDISM DUE TO HASHIMOTO'S THYROIDITIS: ICD-10-CM

## 2023-01-27 DIAGNOSIS — E03.8 HYPOTHYROIDISM DUE TO HASHIMOTO'S THYROIDITIS: ICD-10-CM

## 2023-01-27 LAB
ALBUMIN SERPL-MCNC: 3.6 G/DL (ref 3.4–5)
ALBUMIN/GLOB SERPL: 1.2 {RATIO} (ref 1–2)
ALP LIVER SERPL-CCNC: 66 U/L
ALT SERPL-CCNC: 36 U/L
ANION GAP SERPL CALC-SCNC: 4 MMOL/L (ref 0–18)
AST SERPL-CCNC: 24 U/L (ref 15–37)
BASOPHILS # BLD AUTO: 0.05 X10(3) UL (ref 0–0.2)
BASOPHILS NFR BLD AUTO: 1.1 %
BILIRUB SERPL-MCNC: 0.4 MG/DL (ref 0.1–2)
BUN BLD-MCNC: 19 MG/DL (ref 7–18)
BUN/CREAT SERPL: 13.4 (ref 10–20)
CALCIUM BLD-MCNC: 8.8 MG/DL (ref 8.5–10.1)
CHLORIDE SERPL-SCNC: 108 MMOL/L (ref 98–112)
CO2 SERPL-SCNC: 30 MMOL/L (ref 21–32)
CREAT BLD-MCNC: 1.42 MG/DL
DEPRECATED RDW RBC AUTO: 49.6 FL (ref 35.1–46.3)
EOSINOPHIL # BLD AUTO: 0.12 X10(3) UL (ref 0–0.7)
EOSINOPHIL NFR BLD AUTO: 2.6 %
ERYTHROCYTE [DISTWIDTH] IN BLOOD BY AUTOMATED COUNT: 14.8 % (ref 11–15)
FASTING STATUS PATIENT QL REPORTED: YES
GFR SERPLBLD BASED ON 1.73 SQ M-ARVRAT: 59 ML/MIN/1.73M2 (ref 60–?)
GLOBULIN PLAS-MCNC: 2.9 G/DL (ref 2.8–4.4)
GLUCOSE BLD-MCNC: 108 MG/DL (ref 70–99)
HCT VFR BLD AUTO: 53.1 %
HGB BLD-MCNC: 17.9 G/DL
IMM GRANULOCYTES # BLD AUTO: 0.02 X10(3) UL (ref 0–1)
IMM GRANULOCYTES NFR BLD: 0.4 %
LYMPHOCYTES # BLD AUTO: 0.61 X10(3) UL (ref 1–4)
LYMPHOCYTES NFR BLD AUTO: 13.1 %
MCH RBC QN AUTO: 30.5 PG (ref 26–34)
MCHC RBC AUTO-ENTMCNC: 33.7 G/DL (ref 31–37)
MCV RBC AUTO: 90.6 FL
MONOCYTES # BLD AUTO: 0.46 X10(3) UL (ref 0.1–1)
MONOCYTES NFR BLD AUTO: 9.9 %
NEUTROPHILS # BLD AUTO: 3.4 X10 (3) UL (ref 1.5–7.7)
NEUTROPHILS # BLD AUTO: 3.4 X10(3) UL (ref 1.5–7.7)
NEUTROPHILS NFR BLD AUTO: 72.9 %
OSMOLALITY SERPL CALC.SUM OF ELEC: 297 MOSM/KG (ref 275–295)
PLATELET # BLD AUTO: 220 10(3)UL (ref 150–450)
POTASSIUM SERPL-SCNC: 3.6 MMOL/L (ref 3.5–5.1)
PROT SERPL-MCNC: 6.5 G/DL (ref 6.4–8.2)
RBC # BLD AUTO: 5.86 X10(6)UL
SODIUM SERPL-SCNC: 142 MMOL/L (ref 136–145)
T4 FREE SERPL-MCNC: 1.1 NG/DL (ref 0.8–1.7)
TESTOST SERPL-MCNC: 465.65 NG/DL
TSI SER-ACNC: 4.47 MIU/ML (ref 0.36–3.74)
WBC # BLD AUTO: 4.7 X10(3) UL (ref 4–11)

## 2023-01-27 PROCEDURE — 84439 ASSAY OF FREE THYROXINE: CPT

## 2023-01-27 PROCEDURE — 85025 COMPLETE CBC W/AUTO DIFF WBC: CPT

## 2023-01-27 PROCEDURE — 84403 ASSAY OF TOTAL TESTOSTERONE: CPT | Performed by: FAMILY MEDICINE

## 2023-01-27 PROCEDURE — 84443 ASSAY THYROID STIM HORMONE: CPT

## 2023-01-27 PROCEDURE — 80053 COMPREHEN METABOLIC PANEL: CPT

## 2023-01-30 ENCOUNTER — OFFICE VISIT (OUTPATIENT)
Dept: FAMILY MEDICINE CLINIC | Facility: CLINIC | Age: 53
End: 2023-01-30
Payer: COMMERCIAL

## 2023-01-30 VITALS
HEIGHT: 68 IN | HEART RATE: 72 BPM | SYSTOLIC BLOOD PRESSURE: 138 MMHG | BODY MASS INDEX: 31.83 KG/M2 | DIASTOLIC BLOOD PRESSURE: 82 MMHG | RESPIRATION RATE: 14 BRPM | WEIGHT: 210 LBS | TEMPERATURE: 97 F

## 2023-01-30 DIAGNOSIS — F41.1 GENERALIZED ANXIETY DISORDER: ICD-10-CM

## 2023-01-30 DIAGNOSIS — E06.3 HYPOTHYROIDISM DUE TO HASHIMOTO'S THYROIDITIS: ICD-10-CM

## 2023-01-30 DIAGNOSIS — E03.8 HYPOTHYROIDISM DUE TO HASHIMOTO'S THYROIDITIS: ICD-10-CM

## 2023-01-30 DIAGNOSIS — D58.2 ELEVATED HEMOGLOBIN (HCC): ICD-10-CM

## 2023-01-30 DIAGNOSIS — N17.9 AKI (ACUTE KIDNEY INJURY) (HCC): ICD-10-CM

## 2023-01-30 DIAGNOSIS — E29.1 MALE HYPOGONADISM: ICD-10-CM

## 2023-01-30 DIAGNOSIS — E78.2 MIXED HYPERLIPIDEMIA: ICD-10-CM

## 2023-01-30 DIAGNOSIS — I10 ESSENTIAL HYPERTENSION WITH GOAL BLOOD PRESSURE LESS THAN 130/80: Primary | ICD-10-CM

## 2023-01-30 RX ORDER — LEVOTHYROXINE SODIUM 88 UG/1
88 TABLET ORAL
Qty: 90 TABLET | Refills: 0 | Status: SHIPPED | OUTPATIENT
Start: 2023-01-30

## 2023-02-08 DIAGNOSIS — R79.89 LOW TESTOSTERONE IN MALE: ICD-10-CM

## 2023-02-08 DIAGNOSIS — E29.1 MALE HYPOGONADISM: ICD-10-CM

## 2023-02-09 RX ORDER — TESTOSTERONE CYPIONATE 200 MG/ML
INJECTION INTRAMUSCULAR
Qty: 6 ML | Refills: 0 | Status: SHIPPED | OUTPATIENT
Start: 2023-02-09

## 2023-02-16 ENCOUNTER — OFFICE VISIT (OUTPATIENT)
Dept: FAMILY MEDICINE CLINIC | Facility: CLINIC | Age: 53
End: 2023-02-16
Payer: COMMERCIAL

## 2023-02-16 VITALS
WEIGHT: 215 LBS | HEART RATE: 72 BPM | HEIGHT: 68 IN | DIASTOLIC BLOOD PRESSURE: 88 MMHG | TEMPERATURE: 97 F | SYSTOLIC BLOOD PRESSURE: 128 MMHG | BODY MASS INDEX: 32.58 KG/M2 | RESPIRATION RATE: 16 BRPM

## 2023-02-16 DIAGNOSIS — Z12.11 COLON CANCER SCREENING: ICD-10-CM

## 2023-02-16 DIAGNOSIS — H10.33 ACUTE CONJUNCTIVITIS OF BOTH EYES, UNSPECIFIED ACUTE CONJUNCTIVITIS TYPE: Primary | ICD-10-CM

## 2023-02-16 DIAGNOSIS — R19.7 ACUTE DIARRHEA: ICD-10-CM

## 2023-02-16 DIAGNOSIS — M25.511 ACUTE PAIN OF RIGHT SHOULDER: ICD-10-CM

## 2023-02-16 LAB
COVID19 BINAX NOW ANTIGEN: NOT DETECTED
OPERATOR ID: NORMAL
POCT LOT NUMBER: NORMAL

## 2023-02-16 PROCEDURE — 3074F SYST BP LT 130 MM HG: CPT | Performed by: FAMILY MEDICINE

## 2023-02-16 PROCEDURE — 3079F DIAST BP 80-89 MM HG: CPT | Performed by: FAMILY MEDICINE

## 2023-02-16 PROCEDURE — 99214 OFFICE O/P EST MOD 30 MIN: CPT | Performed by: FAMILY MEDICINE

## 2023-02-16 PROCEDURE — 3008F BODY MASS INDEX DOCD: CPT | Performed by: FAMILY MEDICINE

## 2023-02-16 RX ORDER — POLYMYXIN B SULFATE AND TRIMETHOPRIM 1; 10000 MG/ML; [USP'U]/ML
1 SOLUTION OPHTHALMIC EVERY 4 HOURS
Qty: 10 ML | Refills: 0 | Status: SHIPPED | OUTPATIENT
Start: 2023-02-16 | End: 2023-02-23

## 2023-02-20 DIAGNOSIS — E78.2 MIXED HYPERLIPIDEMIA: ICD-10-CM

## 2023-02-21 RX ORDER — ATORVASTATIN CALCIUM 40 MG/1
TABLET, FILM COATED ORAL
Qty: 90 TABLET | Refills: 0 | Status: SHIPPED | OUTPATIENT
Start: 2023-02-21

## 2023-02-22 PROBLEM — Z86.0101 HISTORY OF ADENOMATOUS POLYP OF COLON: Status: ACTIVE | Noted: 2019-02-21

## 2023-02-22 PROBLEM — D12.5 BENIGN NEOPLASM OF SIGMOID COLON: Status: ACTIVE | Noted: 2023-02-22

## 2023-02-22 PROBLEM — D12.3 BENIGN NEOPLASM OF TRANSVERSE COLON: Status: ACTIVE | Noted: 2023-02-22

## 2023-02-22 PROBLEM — Z86.010 HISTORY OF ADENOMATOUS POLYP OF COLON: Status: ACTIVE | Noted: 2019-02-21

## 2023-02-24 DIAGNOSIS — I10 ESSENTIAL HYPERTENSION WITH GOAL BLOOD PRESSURE LESS THAN 130/80: ICD-10-CM

## 2023-02-24 DIAGNOSIS — F41.1 GENERALIZED ANXIETY DISORDER: ICD-10-CM

## 2023-02-24 RX ORDER — TRIAMTERENE AND HYDROCHLOROTHIAZIDE 37.5; 25 MG/1; MG/1
TABLET ORAL
Qty: 90 TABLET | Refills: 0 | Status: SHIPPED | OUTPATIENT
Start: 2023-02-24

## 2023-02-26 RX ORDER — CLONAZEPAM 1 MG/1
TABLET ORAL
Qty: 30 TABLET | Refills: 2 | Status: SHIPPED | OUTPATIENT
Start: 2023-02-26

## 2023-02-27 ENCOUNTER — TELEPHONE (OUTPATIENT)
Dept: ORTHOPEDICS CLINIC | Facility: CLINIC | Age: 53
End: 2023-02-27

## 2023-02-27 ENCOUNTER — OFFICE VISIT (OUTPATIENT)
Dept: ORTHOPEDICS CLINIC | Facility: CLINIC | Age: 53
End: 2023-02-27
Payer: COMMERCIAL

## 2023-02-27 ENCOUNTER — HOSPITAL ENCOUNTER (OUTPATIENT)
Dept: GENERAL RADIOLOGY | Age: 53
Discharge: HOME | End: 2023-02-27
Attending: ORTHOPAEDIC SURGERY
Payer: COMMERCIAL

## 2023-02-27 VITALS — BODY MASS INDEX: 32.58 KG/M2 | HEIGHT: 68 IN | WEIGHT: 215 LBS

## 2023-02-27 DIAGNOSIS — M19.011 ARTHRITIS OF RIGHT ACROMIOCLAVICULAR JOINT: ICD-10-CM

## 2023-02-27 DIAGNOSIS — M25.511 RIGHT SHOULDER PAIN, UNSPECIFIED CHRONICITY: ICD-10-CM

## 2023-02-27 DIAGNOSIS — M75.41 IMPINGEMENT SYNDROME OF RIGHT SHOULDER: ICD-10-CM

## 2023-02-27 DIAGNOSIS — Z98.890 HISTORY OF ROTATOR CUFF SURGERY: Primary | ICD-10-CM

## 2023-02-27 PROCEDURE — 73030 X-RAY EXAM OF SHOULDER: CPT | Performed by: ORTHOPAEDIC SURGERY

## 2023-02-27 NOTE — TELEPHONE ENCOUNTER
Pt is calling because he called central scheduling and did not have MRI. MRI order is pending in OV. Please advise.

## 2023-03-02 ENCOUNTER — OFFICE VISIT (OUTPATIENT)
Dept: HEMATOLOGY/ONCOLOGY | Facility: HOSPITAL | Age: 53
End: 2023-03-02
Attending: INTERNAL MEDICINE
Payer: COMMERCIAL

## 2023-03-02 VITALS
SYSTOLIC BLOOD PRESSURE: 128 MMHG | HEART RATE: 71 BPM | DIASTOLIC BLOOD PRESSURE: 85 MMHG | OXYGEN SATURATION: 97 % | WEIGHT: 213 LBS | RESPIRATION RATE: 18 BRPM | BODY MASS INDEX: 32 KG/M2 | TEMPERATURE: 97 F

## 2023-03-02 DIAGNOSIS — G35 MULTIPLE SCLEROSIS (HCC): ICD-10-CM

## 2023-03-02 DIAGNOSIS — D75.1 ERYTHROCYTOSIS: Primary | ICD-10-CM

## 2023-03-02 DIAGNOSIS — D72.810 LYMPHOPENIA: ICD-10-CM

## 2023-03-02 DIAGNOSIS — E29.1 MALE HYPOGONADISM: ICD-10-CM

## 2023-03-02 LAB
BASOPHILS # BLD AUTO: 0.08 X10(3) UL (ref 0–0.2)
BASOPHILS NFR BLD AUTO: 1.3 %
EOSINOPHIL # BLD AUTO: 0.11 X10(3) UL (ref 0–0.7)
EOSINOPHIL NFR BLD AUTO: 1.8 %
ERYTHROCYTE [DISTWIDTH] IN BLOOD BY AUTOMATED COUNT: 14.9 %
HCT VFR BLD AUTO: 51.6 %
HGB BLD-MCNC: 18 G/DL
IMM GRANULOCYTES # BLD AUTO: 0.02 X10(3) UL (ref 0–1)
IMM GRANULOCYTES NFR BLD: 0.3 %
LDH SERPL L TO P-CCNC: 229 U/L
LYMPHOCYTES # BLD AUTO: 0.63 X10(3) UL (ref 1–4)
LYMPHOCYTES NFR BLD AUTO: 10.4 %
MCH RBC QN AUTO: 30.8 PG (ref 26–34)
MCHC RBC AUTO-ENTMCNC: 34.9 G/DL (ref 31–37)
MCV RBC AUTO: 88.4 FL
MONOCYTES # BLD AUTO: 0.64 X10(3) UL (ref 0.1–1)
MONOCYTES NFR BLD AUTO: 10.6 %
NEUTROPHILS # BLD AUTO: 4.57 X10 (3) UL (ref 1.5–7.7)
NEUTROPHILS # BLD AUTO: 4.57 X10(3) UL (ref 1.5–7.7)
NEUTROPHILS NFR BLD AUTO: 75.6 %
PLATELET # BLD AUTO: 149 10(3)UL (ref 150–450)
RBC # BLD AUTO: 5.84 X10(6)UL
WBC # BLD AUTO: 6.1 X10(3) UL (ref 4–11)

## 2023-03-02 PROCEDURE — 83615 LACTATE (LD) (LDH) ENZYME: CPT | Performed by: INTERNAL MEDICINE

## 2023-03-02 PROCEDURE — 85025 COMPLETE CBC W/AUTO DIFF WBC: CPT | Performed by: INTERNAL MEDICINE

## 2023-03-02 PROCEDURE — 81270 JAK2 GENE: CPT | Performed by: INTERNAL MEDICINE

## 2023-03-02 PROCEDURE — 36415 COLL VENOUS BLD VENIPUNCTURE: CPT

## 2023-03-02 PROCEDURE — 82668 ASSAY OF ERYTHROPOIETIN: CPT | Performed by: INTERNAL MEDICINE

## 2023-03-02 PROCEDURE — 99211 OFF/OP EST MAY X REQ PHY/QHP: CPT

## 2023-03-02 PROCEDURE — 81279 JAK2 GENE TRGT SEQUENCE ALYS: CPT | Performed by: INTERNAL MEDICINE

## 2023-03-02 NOTE — PROGRESS NOTES
Education Record    Learner:  Patient    Disease / Diagnosis: abnormal labs. , elevated HGB     Barriers / Limitations:  None   Comments:    Method:  Discussion   Comments:    General Topics:  Plan of care reviewed   Comments:    Outcome:  Shows understanding   Comments:  Pt feeling well. Having shoulder pain having MRI next week.

## 2023-03-04 LAB — ERYTHROPOIETIN (EPO): 14 MU/ML

## 2023-03-06 LAB — JAK2 GENE, V617F MUTATION, QUALITATIVE: NOT DETECTED

## 2023-03-08 ENCOUNTER — HOSPITAL ENCOUNTER (OUTPATIENT)
Dept: MRI IMAGING | Facility: HOSPITAL | Age: 53
Discharge: HOME | End: 2023-03-08
Attending: ORTHOPAEDIC SURGERY
Payer: COMMERCIAL

## 2023-03-08 DIAGNOSIS — M19.011 ARTHRITIS OF RIGHT ACROMIOCLAVICULAR JOINT: ICD-10-CM

## 2023-03-08 DIAGNOSIS — M75.41 IMPINGEMENT SYNDROME OF RIGHT SHOULDER: ICD-10-CM

## 2023-03-08 DIAGNOSIS — Z98.890 HISTORY OF ROTATOR CUFF SURGERY: ICD-10-CM

## 2023-03-08 PROCEDURE — 73221 MRI JOINT UPR EXTREM W/O DYE: CPT | Performed by: ORTHOPAEDIC SURGERY

## 2023-03-09 ENCOUNTER — OFFICE VISIT (OUTPATIENT)
Dept: ORTHOPEDICS CLINIC | Facility: CLINIC | Age: 53
End: 2023-03-09
Payer: COMMERCIAL

## 2023-03-09 ENCOUNTER — PATIENT MESSAGE (OUTPATIENT)
Dept: ORTHOPEDICS CLINIC | Facility: CLINIC | Age: 53
End: 2023-03-09

## 2023-03-09 DIAGNOSIS — M75.41 IMPINGEMENT SYNDROME OF RIGHT SHOULDER: ICD-10-CM

## 2023-03-09 DIAGNOSIS — M19.011 ARTHRITIS OF RIGHT ACROMIOCLAVICULAR JOINT: Primary | ICD-10-CM

## 2023-03-09 DIAGNOSIS — M75.81 TENDINITIS OF RIGHT ROTATOR CUFF: ICD-10-CM

## 2023-03-09 RX ORDER — TRIAMCINOLONE ACETONIDE 40 MG/ML
20 INJECTION, SUSPENSION INTRA-ARTICULAR; INTRAMUSCULAR ONCE
Status: COMPLETED | OUTPATIENT
Start: 2023-03-09 | End: 2023-03-09

## 2023-03-09 RX ADMIN — TRIAMCINOLONE ACETONIDE 20 MG: 40 INJECTION, SUSPENSION INTRA-ARTICULAR; INTRAMUSCULAR at 11:44:00

## 2023-03-09 NOTE — TELEPHONE ENCOUNTER
From: Luz Sue  To: Nelly Burris MD  Sent: 3/9/2023 12:14 PM CST  Subject: Results     Hello, the test results are in. I don't understand what they mean or if its nothing, why am I having the pain? Or did they find something that I am not understanding reading their findings?      Thanks

## 2023-03-09 NOTE — PROGRESS NOTES
[facility] Follow-up Progress Note      Chief Complaint: Right shoulder pain      History: The patient is a 78-year-old active man presenting for follow-up of his right shoulder after undergoing an MRI yesterday. He has a history of previous rotator cuff injury and repair and remains very active not only with weight training but also his side job as a DJ. 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 Pain is localized to the anterior and superior right shoulder with subjective weakness and overhead lifting. MRI was obtained to rule out obvious large recurrent tearing. Physical Exam: Patient has a muscular build with no apparent swelling, discoloration or deformity about the shoulders. Active range is maintained although there is pain through the impingement zone on elevation and abduction. Slight diminished active external rotation persists on the right. Kimberli Hernandez remains painful but his [facility] joint remains tender with increased pain on crossarm adduction. Pain is reported on empty can and resisted external rotation on the right. Imaging Results: MRI obtained late last evening personally viewed, independently interpreted and radiology report read. I relayed to the patient that I do not see any clear or obvious recurrent rotator cuff tearing. There is some edema at the anterior supraspinatus insertion consistent with probable sprain but no clear full-thickness tear. I also spoke with Dr. Maurisio Cast from radiology. MRI SHOULDER, RIGHT (CPT=73221)    Result Date: 3/9/2023  CONCLUSION:  1. There is evidence of a prior rotator cuff repair. There is rotator cuff tendinosis diffusely. There is a questionable focus of focal full-thickness perforation along the distal, posterior aspect of the supraspinatus tendon fibers. There is no evidence to suggest a large, full-thickness recurrent rotator cuff tear.  2. Rotator cuff muscle bulk is within normal limits without evidence of significant muscular strain, edema or atrophy. 3. Moderate AC joint arthropathy is noted. There is a type 2 acromion without evidence of subacromial/subdeltoid bursitis or subacromial enthesopathy. 4. Chronic global labral degenerative tearing. Dictated by (CST): Alvarado Newman DO on 3/09/2023 at 11:14 AM     Finalized by (CST): Alvarado Newman DO on 3/09/2023 at 11:18 AM       XR SHOULDER, COMPLETE (MIN 2 VIEWS), RIGHT (CPT=73030)    Result Date: 2/27/2023  CONCLUSION:  1. Mild primary osteoarthritic changes of right shoulder are apparent. 2. Postoperative changes of prior rotator cuff repair are suggested. 3. No radiographically visible acute osseous injury of the right shoulder. elm-remote. Dictated by (CST): Rebecca Ojeda MD on 2/27/2023 at 11:18 PM     Finalized by (CST): Rebecca Ojeda MD on 2/27/2023 at 11:20 PM            Assessment: Diagnoses and all orders for this visit:    Arthritis of right acromioclavicular joint    Tendinitis of right rotator cuff    Impingement syndrome of right shoulder      Plan: I reviewed imaging and exam findings with the patient. In light of his continued symptoms, I suggested a therapeutic and diagnostic injection. Most of his pain today seems to be localized to the [facility] joint. There are some soft MRI findings suggestive of possible tiny recurrent tearing of versus edema from a mild strain to the insertion. I therefore recommend conservative observation with regard to the rotator cuff. Injection to the [facility] joint could result in symptomatic relief although if he continues to have pain this would point more to the subdeltoid space and rotator cuff insertion. Risk benefits and alternatives to the therapeutic and diagnostic injection were reviewed and the patient gives written consent to proceed. I asked him to lay off of any weight training to the shoulders that require any abduction or elevation.   Biceps and triceps should be well-tolerated. His side job as a DJ demands a fairly significant lifting and loading of his equipment which is certainly a risk factor as well. All questions were answered and he verbalized understanding and appreciation. Follow-up if symptoms are not improved in about 4 weeks. Acromioclavicular joint injection procedure:  After discussing risk benefits and alternatives to acromioclavicular joint injection including but not limited to possible needle infection, steroid flareup pain or failed improvement the patient gave written and verbal consent to proceed. Using meticulous sterile technique and after attempted aspiration, I injected 20 mg of triamcinolone mixed with 1 cc of 1% Xylocaine at the superior soft spot between the distal clavicle and the medial acromion of the right shoulder to minimal resistance with free flow of fluid. The patient tolerated the injection well and a band-aid was applied. Instructions were given to contact us if there are any adverse reactions        Florinda Brantley MD, [place] [address] Medicine/Knee and Shoulder  [facility] Department of Orthopaedics  Phone [phone]  Fax [phone]      This document was partially prepared using 0457 [employer] voice recognition software.   Although every attempt is made to correct errors during dictation, discrepancies may still exist.

## 2023-03-10 LAB — JAK2 EXON 12 MUTATION ANAL PCR: NOT DETECTED

## 2023-03-14 ENCOUNTER — PATIENT MESSAGE (OUTPATIENT)
Dept: FAMILY MEDICINE CLINIC | Facility: CLINIC | Age: 53
End: 2023-03-14

## 2023-03-15 NOTE — TELEPHONE ENCOUNTER
I did not receive anything from [facility]. Please have patient call insurance to get reason it was denied and call office with explanation.

## 2023-03-15 NOTE — TELEPHONE ENCOUNTER
[facility] sent to pt to notify that I re-sent notes and labs for proof that pt needs medication.

## 2023-03-16 ENCOUNTER — APPOINTMENT (OUTPATIENT)
Dept: HEMATOLOGY/ONCOLOGY | Facility: HOSPITAL | Age: 53
End: 2023-03-16
Attending: INTERNAL MEDICINE
Payer: COMMERCIAL

## 2023-03-27 DIAGNOSIS — I10 ESSENTIAL HYPERTENSION WITH GOAL BLOOD PRESSURE LESS THAN 130/80: ICD-10-CM

## 2023-03-29 RX ORDER — TRIAMTERENE AND HYDROCHLOROTHIAZIDE 37.5; 25 MG/1; MG/1
TABLET ORAL
Qty: 90 TABLET | Refills: 0 | Status: SHIPPED | OUTPATIENT
Start: 2023-03-29

## 2023-03-29 RX ORDER — AMLODIPINE BESYLATE 10 MG/1
TABLET ORAL
Qty: 90 TABLET | Refills: 0 | Status: SHIPPED | OUTPATIENT
Start: 2023-03-29

## 2023-04-10 ENCOUNTER — TELEPHONE (OUTPATIENT)
Dept: FAMILY MEDICINE CLINIC | Facility: CLINIC | Age: 53
End: 2023-04-10

## 2023-04-10 DIAGNOSIS — E29.1 MALE HYPOGONADISM: Primary | ICD-10-CM

## 2023-04-10 NOTE — TELEPHONE ENCOUNTER
Spoke w/ [employer]ST[employer] rep Kayleigh Gonzalez) to confirm approval of PA. Initial conversation was that PA request was denied and cancelled. But rep started a new case for the PA request and medication is approved. Approval case #[id number]    LVM w/ Pt letting him know PA request has been approved.

## 2023-04-11 ENCOUNTER — PATIENT MESSAGE (OUTPATIENT)
Dept: FAMILY MEDICINE CLINIC | Facility: CLINIC | Age: 53
End: 2023-04-11

## 2023-04-11 ENCOUNTER — LAB ENCOUNTER (OUTPATIENT)
Dept: LAB | Age: 53
End: 2023-04-11
Attending: FAMILY MEDICINE
Payer: COMMERCIAL

## 2023-04-11 DIAGNOSIS — E03.8 HYPOTHYROIDISM DUE TO HASHIMOTO'S THYROIDITIS: ICD-10-CM

## 2023-04-11 DIAGNOSIS — N17.9 AKI (ACUTE KIDNEY INJURY) (HCC): ICD-10-CM

## 2023-04-11 DIAGNOSIS — E06.3 HYPOTHYROIDISM DUE TO HASHIMOTO'S THYROIDITIS: ICD-10-CM

## 2023-04-11 DIAGNOSIS — E78.2 MIXED HYPERLIPIDEMIA: ICD-10-CM

## 2023-04-11 LAB
ALBUMIN SERPL-MCNC: 3.5 G/DL (ref 3.4–5)
ALBUMIN/GLOB SERPL: 1.1 {RATIO} (ref 1–2)
ALP LIVER SERPL-CCNC: 63 U/L
ALT SERPL-CCNC: 35 U/L
ANION GAP SERPL CALC-SCNC: 3 MMOL/L (ref 0–18)
AST SERPL-CCNC: 15 U/L (ref 15–37)
BILIRUB SERPL-MCNC: 0.6 MG/DL (ref 0.1–2)
BUN BLD-MCNC: 21 MG/DL (ref 7–18)
CALCIUM BLD-MCNC: 8.9 MG/DL (ref 8.5–10.1)
CHLORIDE SERPL-SCNC: 112 MMOL/L (ref 98–112)
CHOLEST SERPL-MCNC: 142 MG/DL (ref ?–200)
CO2 SERPL-SCNC: 28 MMOL/L (ref 21–32)
CREAT BLD-MCNC: 1.15 MG/DL
FASTING PATIENT LIPID ANSWER: YES
FASTING STATUS PATIENT QL REPORTED: YES
GFR SERPLBLD BASED ON 1.73 SQ M-ARVRAT: 76 ML/MIN/1.73M2 (ref 60–?)
GLOBULIN PLAS-MCNC: 3.3 G/DL (ref 2.8–4.4)
GLUCOSE BLD-MCNC: 111 MG/DL (ref 70–99)
HDLC SERPL-MCNC: 36 MG/DL (ref 40–59)
LDLC SERPL CALC-MCNC: 76 MG/DL (ref ?–100)
NONHDLC SERPL-MCNC: 106 MG/DL (ref ?–130)
OSMOLALITY SERPL CALC.SUM OF ELEC: 300 MOSM/KG (ref 275–295)
POTASSIUM SERPL-SCNC: 3.6 MMOL/L (ref 3.5–5.1)
PROT SERPL-MCNC: 6.8 G/DL (ref 6.4–8.2)
SODIUM SERPL-SCNC: 143 MMOL/L (ref 136–145)
T4 FREE SERPL-MCNC: 1.1 NG/DL (ref 0.8–1.7)
TRIGL SERPL-MCNC: 172 MG/DL (ref 30–149)
TSI SER-ACNC: 3.15 MIU/ML (ref 0.36–3.74)
VLDLC SERPL CALC-MCNC: 27 MG/DL (ref 0–30)

## 2023-04-11 PROCEDURE — 80053 COMPREHEN METABOLIC PANEL: CPT

## 2023-04-11 PROCEDURE — 84443 ASSAY THYROID STIM HORMONE: CPT

## 2023-04-11 PROCEDURE — 80061 LIPID PANEL: CPT

## 2023-04-11 PROCEDURE — 84439 ASSAY OF FREE THYROXINE: CPT

## 2023-04-13 RX ORDER — LEVOTHYROXINE SODIUM 88 UG/1
TABLET ORAL
Qty: 90 TABLET | Refills: 1 | Status: SHIPPED | OUTPATIENT
Start: 2023-04-13

## 2023-04-19 ENCOUNTER — MED REC SCAN ONLY (OUTPATIENT)
Dept: FAMILY MEDICINE CLINIC | Facility: CLINIC | Age: 53
End: 2023-04-19

## 2023-05-04 DIAGNOSIS — G35 MULTIPLE SCLEROSIS (HCC): ICD-10-CM

## 2023-05-04 RX ORDER — DIROXIMEL FUMARATE 231 MG/1
2 CAPSULE ORAL 2 TIMES DAILY
Qty: 360 CAPSULE | Refills: 0 | Status: SHIPPED | OUTPATIENT
Start: 2023-05-04

## 2023-05-04 NOTE — TELEPHONE ENCOUNTER
Medication: Vumerity     Date of last refill: 01/24/2023 (#360/0)  [place]     Last office visit: 04/26/2022  Due back to clinic per last office note:  6 months  Date next office visit scheduled:    Future Appointments   Date Time Provider [place]   5/31/2023  1:40 PM DO CHRIS Colindres [address] [place] [address] note recommendation:    Assessment: This is a 47 y/o male with multiple sclerosis. Continue Vumerity. Plan:  1. Multiple Sclerosis  - continue Vumerity  - LP with >10 oligoclonal bands and MRI with evidence of demyelinating disease  - LFTs are stable and last done in 10/2021  - RTC in 6 months  - MRI brain and C spine reviewed. No new lesions.  Only minimal progression since previous imaging in 2017

## 2023-05-20 DIAGNOSIS — E78.2 MIXED HYPERLIPIDEMIA: ICD-10-CM

## 2023-05-22 RX ORDER — ATORVASTATIN CALCIUM 40 MG/1
TABLET, FILM COATED ORAL
Qty: 90 TABLET | Refills: 0 | Status: SHIPPED | OUTPATIENT
Start: 2023-05-22

## 2023-05-31 ENCOUNTER — OFFICE VISIT (OUTPATIENT)
Dept: NEUROLOGY | Facility: CLINIC | Age: 53
End: 2023-05-31
Payer: COMMERCIAL

## 2023-05-31 VITALS
BODY MASS INDEX: 34 KG/M2 | DIASTOLIC BLOOD PRESSURE: 78 MMHG | HEART RATE: 81 BPM | SYSTOLIC BLOOD PRESSURE: 122 MMHG | RESPIRATION RATE: 16 BRPM | WEIGHT: 225 LBS

## 2023-05-31 DIAGNOSIS — M54.16 LUMBAR RADICULOPATHY: ICD-10-CM

## 2023-05-31 DIAGNOSIS — G35 MULTIPLE SCLEROSIS (HCC): Primary | ICD-10-CM

## 2023-05-31 PROCEDURE — 99213 OFFICE O/P EST LOW 20 MIN: CPT | Performed by: OTHER

## 2023-05-31 PROCEDURE — 3078F DIAST BP <80 MM HG: CPT | Performed by: OTHER

## 2023-05-31 PROCEDURE — 3074F SYST BP LT 130 MM HG: CPT | Performed by: OTHER

## 2023-05-31 RX ORDER — DIROXIMEL FUMARATE 231 MG/1
2 CAPSULE ORAL 2 TIMES DAILY
Qty: 180 CAPSULE | Refills: 2 | Status: SHIPPED | OUTPATIENT
Start: 2023-05-31 | End: 2023-06-01

## 2023-06-01 DIAGNOSIS — G35 MULTIPLE SCLEROSIS (HCC): ICD-10-CM

## 2023-06-01 RX ORDER — DIROXIMEL FUMARATE 231 MG/1
2 CAPSULE ORAL 2 TIMES DAILY
Qty: 360 CAPSULE | Refills: 2 | Status: SHIPPED | OUTPATIENT
Start: 2023-06-01

## 2023-06-01 NOTE — TELEPHONE ENCOUNTER
Received clarification request. Patient was given vumerity with 2 refills but a quantity of 180. Patient takes 4 caps a day and will need a quantity of 360.      Medication changed per request.

## 2023-06-13 ENCOUNTER — TELEPHONE (OUTPATIENT)
Dept: NEUROLOGY | Facility: CLINIC | Age: 53
End: 2023-06-13

## 2023-06-13 NOTE — TELEPHONE ENCOUNTER
Spoke with Rosa Elena at [facility] [place] and requested she fax the reason for denial of MRI Lumbar Spine to [phone]

## 2023-06-13 NOTE — TELEPHONE ENCOUNTER
Williams Powers from [place] is calling to notify MRI Lumbar Spine has been denied is asking if provider would like to do a peer to peer if so please contact [phone] to schedule

## 2023-06-16 ENCOUNTER — TELEPHONE (OUTPATIENT)
Dept: CASE MANAGEMENT | Age: 53
End: 2023-06-16

## 2023-06-16 NOTE — TELEPHONE ENCOUNTER
MRI SPINE LUMBAR (CPT=72148) has been denied by Eugene Ojeda and will be closed as denied by health plan. Called [facility] as I was unable to view the denial details online. Per intake rep, states CPT 92130 was denied due to imaging requires 6 weeks of provider directed treatment (NSAIDS/PT). If you wish to speak to Eugene Ojeda in regards to this denial please call [phone] reference case #[id number]. Called patient in regards to denial, unable to reach, left voicemail. Patient is scheduled for 6/19/23. Please reach out to the patient of plan of care.

## 2023-06-16 NOTE — TELEPHONE ENCOUNTER
DESHAUN unable to scheudle peer to peer for Lumbar Spine that would work in time frame prior to current scheduled imaging. DESHAUN will re-order Lumbar spine after 6 weeks conservative treatment per denial stipulations.      Routed to referral team.

## 2023-06-16 NOTE — TELEPHONE ENCOUNTER
DESHAUN attempted to schedule P2P, but was unable to find a time for appt that would work within time frame and provider antwan. Currently only imaging denied is MRI Lumbar Spine. Per the denial, approval of imaging requires 6 weeks conservative treatment. Pt to complete approved MRIs and DESHAUN will reorder lumbar spine in 6 weeks. Pt aware. Routed to provider to inform.

## 2023-06-19 ENCOUNTER — HOSPITAL ENCOUNTER (OUTPATIENT)
Dept: MRI IMAGING | Age: 53
Discharge: HOME | End: 2023-06-19
Attending: Other
Payer: COMMERCIAL

## 2023-06-19 ENCOUNTER — APPOINTMENT (OUTPATIENT)
Dept: MRI IMAGING | Age: 53
End: 2023-06-19
Attending: Other
Payer: COMMERCIAL

## 2023-06-19 DIAGNOSIS — G35 MULTIPLE SCLEROSIS (HCC): ICD-10-CM

## 2023-06-19 PROCEDURE — 70553 MRI BRAIN STEM W/O & W/DYE: CPT | Performed by: OTHER

## 2023-06-19 PROCEDURE — A9575 INJ GADOTERATE MEGLUMI 0.1ML: HCPCS

## 2023-06-19 PROCEDURE — 70543 MRI ORBT/FAC/NCK W/O &W/DYE: CPT | Performed by: OTHER

## 2023-06-19 RX ORDER — GADOTERATE MEGLUMINE 376.9 MG/ML
20 INJECTION INTRAVENOUS
Status: COMPLETED | OUTPATIENT
Start: 2023-06-19 | End: 2023-06-19

## 2023-06-19 RX ADMIN — GADOTERATE MEGLUMINE 20 ML: 376.9 INJECTION INTRAVENOUS at 11:53:00

## 2023-06-21 ENCOUNTER — PATIENT MESSAGE (OUTPATIENT)
Dept: NEUROLOGY | Facility: CLINIC | Age: 53
End: 2023-06-21

## 2023-06-21 NOTE — TELEPHONE ENCOUNTER
From: Carine Carlson  To: Radha Duckworth DO  Sent: 6/21/2023 1:15 PM CDT  Subject: back mri    since the brain mri was ok, should we skip the back mri for now? Maybe I was just having a bad month or something.

## 2023-08-02 ENCOUNTER — PATIENT MESSAGE (OUTPATIENT)
Dept: FAMILY MEDICINE CLINIC | Facility: CLINIC | Age: 53
End: 2023-08-02

## 2023-08-02 DIAGNOSIS — I10 ESSENTIAL HYPERTENSION WITH GOAL BLOOD PRESSURE LESS THAN 130/80: ICD-10-CM

## 2023-08-02 DIAGNOSIS — F41.1 GENERALIZED ANXIETY DISORDER: ICD-10-CM

## 2023-08-02 DIAGNOSIS — E78.2 MIXED HYPERLIPIDEMIA: ICD-10-CM

## 2023-08-02 NOTE — TELEPHONE ENCOUNTER
From: Soraya Murry  To: [facility], DO  Sent: 8/2/2023 8:53 AM CDT  Subject: clonazepam refill    Hello, I just noticed my clonazepam is out of refills. I still have plenty left however, I will be going to [facility] on aug 6 and not returning until Sept 15th. I will not have enough for the trip. Last time I was in [facility], because I has a refill left, they were able to refill it in [place]. Is there anyway to either change my refill to 1 on the prescription I already have or when its time and I call [facility] in [place], will they be able to have a new perscription called into them? Last time i was in [facility], I had 1 left so, they had no problems refilling it but, they did tell me if I had 0 left, their might have been an issue. Just trying to avoid a problem before it happens. Thanks.

## 2023-08-02 NOTE — TELEPHONE ENCOUNTER
LOV: 1/30/23  Next OV: F/u in 6 months for annual physical exam   Last Refill:4/10/23    Medication Quantity Refills Start End   clonazePAM 1 MG Oral Tab 30 tablet 2 4/10/2023    Sig:   Take 1 tablet (1 mg total) by mouth nightly as needed for Anxiety. Route:   Oral       Replied to Pt that he needs to schedule CPX asap. Please authorize if acceptable. Thank you!

## 2023-08-03 RX ORDER — CLONAZEPAM 1 MG/1
1 TABLET ORAL NIGHTLY PRN
Qty: 30 TABLET | Refills: 0 | Status: SHIPPED | OUTPATIENT
Start: 2023-08-03

## 2023-08-03 RX ORDER — ATORVASTATIN CALCIUM 40 MG/1
TABLET, FILM COATED ORAL
Qty: 90 TABLET | Refills: 0 | Status: SHIPPED | OUTPATIENT
Start: 2023-08-03

## 2023-08-03 RX ORDER — TRIAMTERENE AND HYDROCHLOROTHIAZIDE 37.5; 25 MG/1; MG/1
TABLET ORAL
Qty: 90 TABLET | Refills: 0 | Status: SHIPPED | OUTPATIENT
Start: 2023-08-03

## 2023-08-03 RX ORDER — AMLODIPINE BESYLATE 10 MG/1
TABLET ORAL
Qty: 90 TABLET | Refills: 0 | Status: SHIPPED | OUTPATIENT
Start: 2023-08-03

## 2023-08-23 ENCOUNTER — PATIENT MESSAGE (OUTPATIENT)
Dept: FAMILY MEDICINE CLINIC | Facility: CLINIC | Age: 53
End: 2023-08-23

## 2023-08-23 DIAGNOSIS — F41.1 GENERALIZED ANXIETY DISORDER: ICD-10-CM

## 2023-08-23 NOTE — TELEPHONE ENCOUNTER
From: Khushbu Le  To: [facility], DO  Sent: 8/23/2023 1:26 PM CDT  Subject: clonazepam I am in [place] for a month    Hello, you might get a call from [facility] to fill clonazepam from [address] . The one in [facility] said they cannot send it over here because of the type of pill it is. [facility] also said the same thing.

## 2023-08-24 RX ORDER — CLONAZEPAM 1 MG/1
1 TABLET ORAL NIGHTLY PRN
Qty: 30 TABLET | Refills: 0 | Status: SHIPPED | OUTPATIENT
Start: 2023-08-24

## 2023-08-24 NOTE — TELEPHONE ENCOUNTER
Medication reordered and pended with correct pharmacy. Clonazepam  Last time medication was refilled 8/3/23  Quantity and number of refills 30 w/ 0   Last OV 2/16/23  Next OV none scheduled       Patient stated, \"I am not moving yet. We have been splitting our time between [facility] and [place]. I will be back in [address] [place] [address],[address] Sept 16th. \"

## 2023-09-24 DIAGNOSIS — J30.1 ALLERGIC RHINITIS DUE TO POLLEN: ICD-10-CM

## 2023-09-24 DIAGNOSIS — F41.1 GENERALIZED ANXIETY DISORDER: ICD-10-CM

## 2023-09-25 RX ORDER — AZELASTINE HYDROCHLORIDE 137 UG/1
1 SPRAY, METERED NASAL 2 TIMES DAILY
Qty: 1 EACH | Refills: 1 | Status: SHIPPED | OUTPATIENT
Start: 2023-09-25

## 2023-09-25 RX ORDER — AZELASTINE 1 MG/ML
1 SPRAY, METERED NASAL DAILY
Qty: 3 EACH | Refills: 1 | OUTPATIENT
Start: 2023-09-25

## 2023-09-26 RX ORDER — CLONAZEPAM 1 MG/1
1 TABLET ORAL NIGHTLY PRN
Qty: 30 TABLET | Refills: 0 | Status: SHIPPED | OUTPATIENT
Start: 2023-09-26

## 2023-09-26 NOTE — TELEPHONE ENCOUNTER
Did not pass protocol  Last office visit 11/2  Last refill was: 8/24 30 tabs  Next appointment: 11/2/2023    Please sign pended medication if appropriate

## 2023-09-28 RX ORDER — LEVOTHYROXINE SODIUM 88 UG/1
TABLET ORAL
Qty: 30 TABLET | Refills: 0 | Status: SHIPPED | OUTPATIENT
Start: 2023-09-28

## 2023-09-28 NOTE — TELEPHONE ENCOUNTER
Thyroid Supplements Protocol Oujhil7209/28/2023 10:06 AM   Protocol Details TSH test in past 12 months    TSH value between 0.350 and 5.500 IU/ml    Appointment in past 12 or next 3 months       Last time medication was refilled 4/13/23  Quantity and number of refills 90 w/ 1

## 2023-09-28 NOTE — TELEPHONE ENCOUNTER
He does need labs and they have been ordered. Medication: DIMETHYL FUMARATE 120 MG Oral Capsule Delayed Release    Date of last refill: 11/1/2019 (#14/0)  [place]    Last office visit: 11/1/2019  Due back to clinic per last office note:  3 months  Date next office

## 2023-10-09 ENCOUNTER — TELEPHONE (OUTPATIENT)
Dept: NEUROLOGY | Facility: CLINIC | Age: 53
End: 2023-10-09

## 2023-10-09 NOTE — TELEPHONE ENCOUNTER
Approval Details    Authorization number: [id number];    Authorized from September 9, 2023 to October 8, 2024   Information entered manually

## 2023-10-23 DIAGNOSIS — E29.1 MALE HYPOGONADISM: ICD-10-CM

## 2023-10-23 DIAGNOSIS — R79.89 LOW TESTOSTERONE IN MALE: ICD-10-CM

## 2023-10-23 RX ORDER — TESTOSTERONE CYPIONATE 200 MG/ML
120 INJECTION, SOLUTION INTRAMUSCULAR
Qty: 6 ML | Refills: 0 | Status: SHIPPED | OUTPATIENT
Start: 2023-10-23 | End: 2024-01-24

## 2023-10-23 NOTE — TELEPHONE ENCOUNTER
A refill request was received for:  Requested Prescriptions     Pending Prescriptions Disp Refills    TESTOSTERONE CYPIONATE 200 mg/mL Intramuscular Solution [Pharmacy Med Name: TESTOSTERONE  MG/ML] 6 mL 0     Sig: INJECT 0.6 ML INTERMUSCULAR EVERY 2 WEEKS       Last refill date: 04/10/23      Last office visit: 05/31/23      Future Appointments   Date Time Provider Department Center   10/27/2023  1:00 PM Paul De Leon, DO [id number]

## 2023-10-25 RX ORDER — LEVOTHYROXINE SODIUM 88 UG/1
TABLET ORAL
Qty: 30 TABLET | Refills: 0 | OUTPATIENT
Start: 2023-10-25

## 2023-10-27 ENCOUNTER — OFFICE VISIT (OUTPATIENT)
Dept: FAMILY MEDICINE CLINIC | Facility: CLINIC | Age: 53
End: 2023-10-27

## 2023-10-27 VITALS
BODY MASS INDEX: 34.25 KG/M2 | WEIGHT: 226 LBS | TEMPERATURE: 98 F | HEART RATE: 80 BPM | HEIGHT: 68 IN | DIASTOLIC BLOOD PRESSURE: 74 MMHG | RESPIRATION RATE: 20 BRPM | SYSTOLIC BLOOD PRESSURE: 120 MMHG | OXYGEN SATURATION: 98 %

## 2023-10-27 DIAGNOSIS — E06.3 HYPOTHYROIDISM DUE TO HASHIMOTO'S THYROIDITIS: ICD-10-CM

## 2023-10-27 DIAGNOSIS — E03.8 HYPOTHYROIDISM DUE TO HASHIMOTO'S THYROIDITIS: ICD-10-CM

## 2023-10-27 DIAGNOSIS — Z00.00 ROUTINE GENERAL MEDICAL EXAMINATION AT A HEALTH CARE FACILITY: Primary | ICD-10-CM

## 2023-10-27 DIAGNOSIS — E78.2 MIXED HYPERLIPIDEMIA: ICD-10-CM

## 2023-10-27 DIAGNOSIS — A08.4 VIRAL GASTROENTERITIS: ICD-10-CM

## 2023-10-27 DIAGNOSIS — R79.89 LOW TESTOSTERONE IN MALE: ICD-10-CM

## 2023-10-27 DIAGNOSIS — F41.1 GENERALIZED ANXIETY DISORDER: ICD-10-CM

## 2023-10-27 DIAGNOSIS — I10 ESSENTIAL HYPERTENSION WITH GOAL BLOOD PRESSURE LESS THAN 130/80: ICD-10-CM

## 2023-10-27 PROCEDURE — 3008F BODY MASS INDEX DOCD: CPT | Performed by: FAMILY MEDICINE

## 2023-10-27 PROCEDURE — 99396 PREV VISIT EST AGE 40-64: CPT | Performed by: FAMILY MEDICINE

## 2023-10-27 PROCEDURE — 3078F DIAST BP <80 MM HG: CPT | Performed by: FAMILY MEDICINE

## 2023-10-27 PROCEDURE — 3074F SYST BP LT 130 MM HG: CPT | Performed by: FAMILY MEDICINE

## 2023-10-27 PROCEDURE — 99213 OFFICE O/P EST LOW 20 MIN: CPT | Performed by: FAMILY MEDICINE

## 2023-11-10 ENCOUNTER — LAB ENCOUNTER (OUTPATIENT)
Dept: LAB | Age: 53
End: 2023-11-10
Attending: FAMILY MEDICINE
Payer: COMMERCIAL

## 2023-11-10 DIAGNOSIS — E03.8 HYPOTHYROIDISM DUE TO HASHIMOTO'S THYROIDITIS: ICD-10-CM

## 2023-11-10 DIAGNOSIS — R79.89 LOW TESTOSTERONE IN MALE: ICD-10-CM

## 2023-11-10 DIAGNOSIS — E06.3 HYPOTHYROIDISM DUE TO HASHIMOTO'S THYROIDITIS: ICD-10-CM

## 2023-11-10 DIAGNOSIS — Z00.00 ROUTINE GENERAL MEDICAL EXAMINATION AT A HEALTH CARE FACILITY: ICD-10-CM

## 2023-11-10 LAB
ALBUMIN SERPL-MCNC: 3.6 G/DL (ref 3.4–5)
ALBUMIN/GLOB SERPL: 1.1 {RATIO} (ref 1–2)
ALP LIVER SERPL-CCNC: 52 U/L
ALT SERPL-CCNC: 53 U/L
ANION GAP SERPL CALC-SCNC: 8 MMOL/L (ref 0–18)
AST SERPL-CCNC: 28 U/L (ref 15–37)
BASOPHILS # BLD AUTO: 0.04 X10(3) UL (ref 0–0.2)
BASOPHILS NFR BLD AUTO: 1 %
BILIRUB SERPL-MCNC: 0.8 MG/DL (ref 0.1–2)
BILIRUB UR QL STRIP.AUTO: NEGATIVE
BUN BLD-MCNC: 25 MG/DL (ref 9–23)
CALCIUM BLD-MCNC: 8.9 MG/DL (ref 8.5–10.1)
CHLORIDE SERPL-SCNC: 105 MMOL/L (ref 98–112)
CHOLEST SERPL-MCNC: 147 MG/DL (ref ?–200)
CLARITY UR REFRACT.AUTO: CLEAR
CO2 SERPL-SCNC: 26 MMOL/L (ref 21–32)
COLOR UR AUTO: YELLOW
COMPLEXED PSA SERPL-MCNC: 0.6 NG/ML (ref ?–4)
CREAT BLD-MCNC: 1.18 MG/DL
EGFRCR SERPLBLD CKD-EPI 2021: 73 ML/MIN/1.73M2 (ref 60–?)
EOSINOPHIL # BLD AUTO: 0.1 X10(3) UL (ref 0–0.7)
EOSINOPHIL NFR BLD AUTO: 2.4 %
ERYTHROCYTE [DISTWIDTH] IN BLOOD BY AUTOMATED COUNT: 14.3 %
FASTING PATIENT LIPID ANSWER: YES
FASTING STATUS PATIENT QL REPORTED: YES
GLOBULIN PLAS-MCNC: 3.4 G/DL (ref 2.8–4.4)
GLUCOSE BLD-MCNC: 94 MG/DL (ref 70–99)
GLUCOSE UR STRIP.AUTO-MCNC: NORMAL MG/DL
HCT VFR BLD AUTO: 54.1 %
HDLC SERPL-MCNC: 36 MG/DL (ref 40–59)
HGB BLD-MCNC: 18.6 G/DL
IMM GRANULOCYTES # BLD AUTO: 0.03 X10(3) UL (ref 0–1)
IMM GRANULOCYTES NFR BLD: 0.7 %
KETONES UR STRIP.AUTO-MCNC: NEGATIVE MG/DL
LDLC SERPL CALC-MCNC: 87 MG/DL (ref ?–100)
LEUKOCYTE ESTERASE UR QL STRIP.AUTO: NEGATIVE
LYMPHOCYTES # BLD AUTO: 0.34 X10(3) UL (ref 1–4)
LYMPHOCYTES NFR BLD AUTO: 8.2 %
MCH RBC QN AUTO: 30.7 PG (ref 26–34)
MCHC RBC AUTO-ENTMCNC: 34.4 G/DL (ref 31–37)
MCV RBC AUTO: 89.3 FL
MONOCYTES # BLD AUTO: 0.57 X10(3) UL (ref 0.1–1)
MONOCYTES NFR BLD AUTO: 13.8 %
NEUTROPHILS # BLD AUTO: 3.06 X10 (3) UL (ref 1.5–7.7)
NEUTROPHILS # BLD AUTO: 3.06 X10(3) UL (ref 1.5–7.7)
NEUTROPHILS NFR BLD AUTO: 73.9 %
NITRITE UR QL STRIP.AUTO: NEGATIVE
NONHDLC SERPL-MCNC: 111 MG/DL (ref ?–130)
OSMOLALITY SERPL CALC.SUM OF ELEC: 292 MOSM/KG (ref 275–295)
PH UR STRIP.AUTO: 5.5 [PH] (ref 5–8)
PLATELET # BLD AUTO: 156 10(3)UL (ref 150–450)
POTASSIUM SERPL-SCNC: 3.5 MMOL/L (ref 3.5–5.1)
PROT SERPL-MCNC: 7 G/DL (ref 6.4–8.2)
RBC # BLD AUTO: 6.06 X10(6)UL
RBC UR QL AUTO: NEGATIVE
SODIUM SERPL-SCNC: 139 MMOL/L (ref 136–145)
SP GR UR STRIP.AUTO: 1.02 (ref 1–1.03)
T4 FREE SERPL-MCNC: 1.1 NG/DL (ref 0.8–1.7)
TESTOST SERPL-MCNC: 392.7 NG/DL
TRIGL SERPL-MCNC: 137 MG/DL (ref 30–149)
TSI SER-ACNC: 2.59 MIU/ML (ref 0.36–3.74)
UROBILINOGEN UR STRIP.AUTO-MCNC: NORMAL MG/DL
VLDLC SERPL CALC-MCNC: 22 MG/DL (ref 0–30)
WBC # BLD AUTO: 4.1 X10(3) UL (ref 4–11)

## 2023-11-10 PROCEDURE — 80053 COMPREHEN METABOLIC PANEL: CPT

## 2023-11-10 PROCEDURE — 84403 ASSAY OF TOTAL TESTOSTERONE: CPT

## 2023-11-10 PROCEDURE — 81003 URINALYSIS AUTO W/O SCOPE: CPT

## 2023-11-10 PROCEDURE — 80061 LIPID PANEL: CPT

## 2023-11-10 PROCEDURE — 85025 COMPLETE CBC W/AUTO DIFF WBC: CPT

## 2023-11-10 PROCEDURE — 84439 ASSAY OF FREE THYROXINE: CPT

## 2023-11-10 PROCEDURE — 84443 ASSAY THYROID STIM HORMONE: CPT

## 2023-11-13 ENCOUNTER — PATIENT MESSAGE (OUTPATIENT)
Dept: FAMILY MEDICINE CLINIC | Facility: CLINIC | Age: 53
End: 2023-11-13

## 2023-11-14 ENCOUNTER — PATIENT MESSAGE (OUTPATIENT)
Dept: FAMILY MEDICINE CLINIC | Facility: CLINIC | Age: 53
End: 2023-11-14

## 2023-11-14 NOTE — TELEPHONE ENCOUNTER
Patient is asking if he should follow up with Dr. Summer Wallace again? He saw specialist in March and workup was done. Per [facility] from Dr. Summer Wallace regarding labs on 3/6/23:  Gretta Daughters   to Rod Whittaker (supporting Ata Erickson MD)   [facility]      3/6/23  5:04 PM  Your labs came back good. You do not have an evidence of blood disorder. The elevated hemoglobin is from testosterone and the low lymphocytes is from Vumerity. I sent Dr. Anoop Encinas some parameters to monitor your hemoglobin on testosterone (you can see that as well in MyChart). Also would recommend a sleep study for possible sleep apnea.

## 2023-11-14 NOTE — TELEPHONE ENCOUNTER
From: Tatianna Demarco  To: Javon REA  Sent: 11/13/2023 10:37 AM CST  Subject: hematology    'Please follow-up with hematology as recommended\" all my lab results has this added. Who should I see? Who do you recommend? Thanks.

## 2023-11-14 NOTE — TELEPHONE ENCOUNTER
From: Cindy Burt  Sent: 11/14/2023 8:37 AM CST  To: [facility] Clinical Staff  Subject: RE: Hematology    'Please follow-up with hematology as recommended\" all my lab results has this added. Who should I see? Who do you recommend? Thanks. Message from [address] [facility] , sent 8:19 AM    Gene S  8:19 Dr. Kathrin Arnold recommends below:     Referred to Provider Information:  Provider  Address  Phone  Birdie Palencia MD  [address] [place] [address] Dr Koo [address] [place] [address]  [phone]     [facility]      I could not reply to the other email for some reason. I saw this person not even a year ago. this should already be in my chart. . They said i do not have cancer and all was OK. Do i need to see her again?

## 2023-11-15 NOTE — TELEPHONE ENCOUNTER
As long as he has had an evaluation in the past with hematology, only recommend follow up if they recommended it. If not, no further workup needed.

## 2023-11-18 ENCOUNTER — TELEMEDICINE (OUTPATIENT)
Dept: TELEHEALTH | Age: 53
End: 2023-11-18
Payer: COMMERCIAL

## 2023-11-18 DIAGNOSIS — J20.9 ACUTE BRONCHITIS WITH BRONCHOSPASM: ICD-10-CM

## 2023-11-18 DIAGNOSIS — J01.40 ACUTE NON-RECURRENT PANSINUSITIS: Primary | ICD-10-CM

## 2023-11-18 PROCEDURE — 99213 OFFICE O/P EST LOW 20 MIN: CPT | Performed by: NURSE PRACTITIONER

## 2023-11-18 RX ORDER — DOXYCYCLINE HYCLATE 100 MG
100 TABLET ORAL 2 TIMES DAILY
Qty: 20 TABLET | Refills: 0 | Status: SHIPPED | OUTPATIENT
Start: 2023-11-18 | End: 2023-11-28

## 2023-11-18 NOTE — PROGRESS NOTES
CHIEF COMPLAINT:   \" Sinus congestion, lung congestion \"  HPI:   Linette Feldman is a 52year old male. Patient presents via Video Visit on Demand. Patient consents to conducting the visit virtually and acknowledges limitations without an in person examination. Patient presents with a history of viral respiratory symptoms with low grade fevers which progressed to Frontal and Maxillary sinus congestion and pressure. Pt has been blowing out thick yellow secretions and has PND. Ears are not congested. Pt has been coughing up thick hard expectorants from his lung passages. Patient has also had a tight cough with coughing jags. Patient states that he coughed for a half hour last night before he could fall asleep. His chest feels tight. Patient states that he has a hx of frequent sinus infections. He has an Albuterol Inhaler left over from his last sinus infection and bronchitis episode. Pt feels ill and fatigued. Has had fevers up to 100.0 in the past few days. Pt has been taking Phenylephrine. He has used Flonase and Astelin nasal sprays daily for the past 20 years per the patient. Denies any episodes of distress. His home COVID test was Negative yesterday. Current Outpatient Medications   Medication Sig Dispense Refill    Doxycycline Hyclate 100 MG Oral Tab Take 1 tablet (100 mg total) by mouth 2 (two) times daily for 10 days. 20 tablet 0    testosterone cypionate 200 mg/mL Intramuscular Solution Inject 0.6 mL (120 mg total) into the muscle every 14 (fourteen) days. 6 mL 0    LEVOTHYROXINE 88 MCG Oral Tab TAKE 1 TABLET BY MOUTH ONCE DAILY BEFORE BREAKFAST 30 tablet 0    clonazePAM 1 MG Oral Tab Take 1 tablet (1 mg total) by mouth nightly as needed for Anxiety. 30 tablet 0    Azelastine HCl 137 MCG/SPRAY Nasal Solution 1 spray by Nasal route 2 (two) times daily.  1 each 1    AMLODIPINE 10 MG Oral Tab TAKE 1 TABLET BY MOUTH EVERY DAY IN THE EVENING 90 tablet 0    ATORVASTATIN 40 MG Oral Tab TAKE 1 TABLET BY MOUTH EVERY DAY 90 tablet 0    Diroximel Fumarate (VUMERITY) 231 MG Oral Capsule Delayed Release Take 2 capsules by mouth 2 (two) times daily. 360 capsule 2    Syringe/Needle, Disp, (SYRINGE LUER LOCK) 25G X 1-1/2\" 3 ML Does not apply Misc 1 each every 14 (fourteen) days. Pt using terumo luer lock syringes 100 each 0    Syringe/Needle, Disp, (BD LUER-LOCK SYRINGE) 18G X 1-1/2\" 3 ML Does not apply Misc 1 injection every 14 days 100 each 0    aspirin 81 MG Oral Tab Take 1 tablet (81 mg total) by mouth daily. Syringe/Needle, Disp, (BD LUER-IDANIA SYRINGE) 25G X 1\" 3 ML Does not apply Misc To inject testosterone IM q 2 weeks 10 each 1    FLUTICASONE PROPIONATE, NASAL, (FLONASE) 50 MCG/ACT Nasal Suspension daily      Fexofenadine HCl (ALLEGRA) 180 MG Oral Tab Take  by mouth daily as needed.         Past Medical History:   Diagnosis Date    Abdominal hernia     Anxiety     Arthritis     Bloating     Chronic rhinitis     Heartburn     High cholesterol     Hyperlipidemia 01/01/2015    HYPOTHYROIDISM     Hypothyroidism 2005    hashimoto's    Multiple sclerosis ([facility] [address]     Obesity     Fat but loosing weight    Other abnormal glucose     Loss of peripheral vision    OTHER DISEASES     hypogonadism    Pain in joints     Shingles     Gold Rasmussen    Sleep apnea no idea    Sleep disturbance     Thyroid disease     Unspecified essential hypertension     Wears glasses     Weight gain       Past Surgical History:   Procedure Laterality Date    ARTHROSCOPY OF JOINT UNLISTED      yoav shoulder    COLONOSCOPY      HERNIA SURGERY  8/2007,11/2009    x3    OTHER SURGICAL HISTORY  09/2008    SINUS x2      Family History   Problem Relation Age of Onset    Cancer Father         GE junction    Alcohol and Other Disorders Associated Father     Colon Cancer Father     Hypertension Father     Lipids Father     Heart Disorder Father     Cancer Maternal Grandmother         breast    Heart Disorder Maternal Grandfather     Cancer Maternal Grandfather     Heart Attack Maternal Grandfather     Heart Attack Paternal Grandmother     Heart Disorder Paternal Grandmother     Cancer Paternal Grandfather     Cancer Paternal Uncle     Lipids Brother       Social History     Socioeconomic History    Marital status:    Tobacco Use    Smoking status: Former     Packs/day: 1.00     Years: 20.00     Additional pack years: 0.00     Total pack years: 20.00     Types: Cigarettes     Quit date: 2000     Years since quittin.8    Smokeless tobacco: Never   Vaping Use    Vaping Use: Never used   Substance and Sexual Activity    Alcohol use: Yes     Alcohol/week: 3.0 standard drinks of alcohol     Types: 3 Shots of liquor per week     Comment: SOCIAL, 2-3 per week    Drug use: Yes     Types: Cannabis     Comment: Cannabis like 1-2X a year max. Other Topics Concern    Caffeine Concern Yes    Stress Concern No    Weight Concern Yes     Comment: currently loosing weight. Special Diet No    Exercise Yes     Comment: Not often    Seat Belt Yes         REVIEW OF SYSTEMS:   GENERAL:  See HPI  SKIN: no c/o rashes or abnormal skin lesions  HEENT: See HPI  LUNGS: denies shortness of breath or wheezing, See HPI  CARDIOVASCULAR: denies chest pain or palpitations. See HPI  GI: no c/o N/V/C or abdominal pain  NEURO: + sinus pressure headaches    EXAM:     Video Visit on Demand    GENERAL: well developed, well nourished. Pt is ill-appearing, but non-toxic appearing. Patient is very pleasant and is able to provide an excellent HPI and ROS. Patient has stopped speaking due to brief tight-sounding coughing jags from which he recovers quickly and resumes speaking without any windedness. No accessory use. ASSESSMENT AND PLAN:   Leora Tilley is a 52year old male who presents with     1. Acute non-recurrent pansinusitis    - Doxycycline Hyclate 100 MG Oral Tab; Take 1 tablet (100 mg total) by mouth 2 (two) times daily for 10 days. Dispense: 20 tablet;  Refill: 0  - Continue nasal spray and supportive measures. 2. Acute bronchitis with bronchospasm    - Patient has an albuterol inhaler.  - Recommended Mucinex with Guaifenesin 600 mg q 12 hours prn lung congestion. Advised patient to follow up with Dr. Haroon Bell if not improving with each day. Advised to go directly to the ED for any worsening of symptoms. Consent given by patient to conduct the Video Visit. Approx 10 minutes spend in direct patient evaluation.

## 2023-11-21 ENCOUNTER — TELEPHONE (OUTPATIENT)
Dept: FAMILY MEDICINE CLINIC | Facility: CLINIC | Age: 53
End: 2023-11-21

## 2023-11-21 DIAGNOSIS — I10 ESSENTIAL HYPERTENSION WITH GOAL BLOOD PRESSURE LESS THAN 130/80: ICD-10-CM

## 2023-11-21 RX ORDER — LEVOTHYROXINE SODIUM 88 UG/1
TABLET ORAL
Qty: 90 TABLET | Refills: 0 | Status: SHIPPED | OUTPATIENT
Start: 2023-11-21

## 2023-11-22 RX ORDER — TRIAMTERENE AND HYDROCHLOROTHIAZIDE 37.5; 25 MG/1; MG/1
1 TABLET ORAL DAILY
Qty: 90 TABLET | Refills: 1 | Status: SHIPPED | OUTPATIENT
Start: 2023-11-22

## 2023-11-22 RX ORDER — AMLODIPINE BESYLATE 10 MG/1
10 TABLET ORAL EVERY EVENING
Qty: 90 TABLET | Refills: 1 | Status: SHIPPED | OUTPATIENT
Start: 2023-11-22

## 2023-11-26 DIAGNOSIS — F41.1 GENERALIZED ANXIETY DISORDER: ICD-10-CM

## 2023-11-27 RX ORDER — CLONAZEPAM 1 MG/1
1 TABLET ORAL NIGHTLY PRN
Qty: 30 TABLET | Refills: 3 | Status: SHIPPED | OUTPATIENT
Start: 2023-11-27

## 2023-12-26 DIAGNOSIS — J30.1 ALLERGIC RHINITIS DUE TO POLLEN: ICD-10-CM

## 2023-12-26 DIAGNOSIS — E78.2 MIXED HYPERLIPIDEMIA: ICD-10-CM

## 2023-12-27 RX ORDER — AZELASTINE HYDROCHLORIDE 137 UG/1
1 SPRAY, METERED NASAL 2 TIMES DAILY
Qty: 1 EACH | Refills: 1 | Status: SHIPPED | OUTPATIENT
Start: 2023-12-27

## 2023-12-27 RX ORDER — ATORVASTATIN CALCIUM 40 MG/1
TABLET, FILM COATED ORAL
Qty: 90 TABLET | Refills: 0 | Status: SHIPPED | OUTPATIENT
Start: 2023-12-27

## 2024-01-20 DIAGNOSIS — R79.89 LOW TESTOSTERONE IN MALE: ICD-10-CM

## 2024-01-20 DIAGNOSIS — E29.1 MALE HYPOGONADISM: ICD-10-CM

## 2024-01-23 DIAGNOSIS — H10.33 ACUTE CONJUNCTIVITIS OF BOTH EYES, UNSPECIFIED ACUTE CONJUNCTIVITIS TYPE: ICD-10-CM

## 2024-01-23 NOTE — TELEPHONE ENCOUNTER
LOV: 10/27/23  Next OV: The patient is asked to return in 6 months pending lab results.   Last Refill:10/23/23  Medication Quantity Refills Start End   testosterone cypionate 200 mg/mL Intramuscular Solution 6 mL 0 10/23/2023 --   Sig:   Inject 0.6 mL (120 mg total) into the muscle every 14 (fourteen) days.     Route:   Intramuscular         Please authorize if acceptable.   Thank you!

## 2024-01-24 RX ORDER — POLYMYXIN B SULFATE AND TRIMETHOPRIM 1; 10000 MG/ML; [USP'U]/ML
1 SOLUTION OPHTHALMIC EVERY 4 HOURS
Qty: 10 ML | Refills: 0 | Status: SHIPPED | OUTPATIENT
Start: 2024-01-24

## 2024-01-24 RX ORDER — TESTOSTERONE CYPIONATE 200 MG/ML
120 INJECTION, SOLUTION INTRAMUSCULAR
Qty: 6 ML | Refills: 0 | Status: SHIPPED | OUTPATIENT
Start: 2024-01-24

## 2024-01-24 NOTE — TELEPHONE ENCOUNTER
LOV:10-27-23    Last Refill:  Medication Quantity Refills Start End   polymyxin B-trimethoprim 46747-0.1 UNIT/ML-% Ophthalmic Solution () 10 mL 0 2022 10/5/2022         RTC:6 months         Please sign if appropriate

## 2024-02-12 ENCOUNTER — OFFICE VISIT (OUTPATIENT)
Dept: FAMILY MEDICINE CLINIC | Facility: CLINIC | Age: 54
End: 2024-02-12
Payer: COMMERCIAL

## 2024-02-12 VITALS
BODY MASS INDEX: 35.46 KG/M2 | SYSTOLIC BLOOD PRESSURE: 130 MMHG | OXYGEN SATURATION: 98 % | DIASTOLIC BLOOD PRESSURE: 70 MMHG | RESPIRATION RATE: 18 BRPM | TEMPERATURE: 97 F | HEART RATE: 80 BPM | WEIGHT: 234 LBS | HEIGHT: 68 IN

## 2024-02-12 DIAGNOSIS — J01.01 ACUTE RECURRENT MAXILLARY SINUSITIS: Primary | ICD-10-CM

## 2024-02-12 PROCEDURE — 99214 OFFICE O/P EST MOD 30 MIN: CPT | Performed by: FAMILY MEDICINE

## 2024-02-12 RX ORDER — METHYLPREDNISOLONE 4 MG/1
TABLET ORAL
Qty: 1 EACH | Refills: 0 | Status: SHIPPED | OUTPATIENT
Start: 2024-02-12

## 2024-02-12 RX ORDER — DOXYCYCLINE HYCLATE 100 MG
100 TABLET ORAL 2 TIMES DAILY
Qty: 20 TABLET | Refills: 0 | Status: SHIPPED | OUTPATIENT
Start: 2024-02-12 | End: 2024-02-22

## 2024-02-13 NOTE — PROGRESS NOTES
HPI:   Lionel Velazquez is a 53 year old male who presents for upper respiratory symptoms for  3  days. Patient reports sore throat, congestion, dry cough, sinus pain, OTC cold meds have not been helping, prior history of sinusitis, denies fever.    Current Outpatient Medications   Medication Sig Dispense Refill    Doxycycline Hyclate 100 MG Oral Tab Take 1 tablet (100 mg total) by mouth 2 (two) times daily for 10 days. 20 tablet 0    methylPREDNISolone (MEDROL) 4 MG Oral Tablet Therapy Pack As directed. 1 each 0    testosterone cypionate 200 mg/mL Intramuscular Solution Inject 0.6 mL (120 mg total) into the muscle every 14 (fourteen) days. 6 mL 0    ATORVASTATIN 40 MG Oral Tab TAKE 1 TABLET BY MOUTH EVERY DAY 90 tablet 0    azelastine 137 MCG/SPRAY Nasal Solution SPRAY 1 SPRAY INTO EACH NOSTRIL TWICE A DAY 1 each 1    clonazePAM 1 MG Oral Tab TAKE 1 TABLET BY MOUTH NIGHTLY AS NEEDED FOR ANXIETY 30 tablet 3    amLODIPine 10 MG Oral Tab Take 1 tablet (10 mg total) by mouth every evening. 90 tablet 1    Triamterene-HCTZ 37.5-25 MG Oral Tab Take 1 tablet by mouth daily. 90 tablet 1    LEVOTHYROXINE 88 MCG Oral Tab TAKE 1 TABLET BY MOUTH ONCE DAILY BEFORE BREAKFAST 90 tablet 0    Diroximel Fumarate (VUMERITY) 231 MG Oral Capsule Delayed Release Take 2 capsules by mouth 2 (two) times daily. 360 capsule 2    Syringe/Needle, Disp, (SYRINGE LUER LOCK) 25G X 1-1/2\" 3 ML Does not apply Misc 1 each every 14 (fourteen) days. Pt using terumo luer lock syringes 100 each 0    Syringe/Needle, Disp, (BD LUER-LOCK SYRINGE) 18G X 1-1/2\" 3 ML Does not apply Misc 1 injection every 14 days 100 each 0    aspirin 81 MG Oral Tab Take 1 tablet (81 mg total) by mouth daily.      Syringe/Needle, Disp, (BD LUER-IDANIA SYRINGE) 25G X 1\" 3 ML Does not apply Misc To inject testosterone IM q 2 weeks 10 each 1    FLUTICASONE PROPIONATE, NASAL, (FLONASE) 50 MCG/ACT Nasal Suspension daily      Fexofenadine HCl (ALLEGRA) 180 MG Oral Tab Take  by mouth  daily as needed.        Past Medical History:   Diagnosis Date    Abdominal hernia     Anxiety     Arthritis     Bloating     Chronic rhinitis     Heartburn     High cholesterol     Hyperlipidemia 2015    HYPOTHYROIDISM     Hypothyroidism 2005    hashimoto's    Multiple sclerosis (HCC)     Obesity     Fat but loosing weight    Other abnormal glucose     Loss of peripheral vision    OTHER DISEASES     hypogonadism    Pain in joints     Shingles     Gold Rasmussen    Sleep apnea no idea    Sleep disturbance     Thyroid disease     Unspecified essential hypertension     Wears glasses     Weight gain       Past Surgical History:   Procedure Laterality Date    ARTHROSCOPY OF JOINT UNLISTED      yoav shoulder    COLONOSCOPY      HERNIA SURGERY  2007,11/2009    x3    OTHER SURGICAL HISTORY  2008    SINUS x2      Family History   Problem Relation Age of Onset    Cancer Father         GE junction    Alcohol and Other Disorders Associated Father     Colon Cancer Father     Hypertension Father     Lipids Father     Heart Disorder Father     Cancer Maternal Grandmother         breast    Heart Disorder Maternal Grandfather     Cancer Maternal Grandfather     Heart Attack Maternal Grandfather     Heart Attack Paternal Grandmother     Heart Disorder Paternal Grandmother     Cancer Paternal Grandfather     Cancer Paternal Uncle     Lipids Brother       Social History     Socioeconomic History    Marital status:    Tobacco Use    Smoking status: Former     Packs/day: 1.00     Years: 20.00     Additional pack years: 0.00     Total pack years: 20.00     Types: Cigarettes     Quit date: 2000     Years since quittin.0    Smokeless tobacco: Never   Vaping Use    Vaping Use: Never used   Substance and Sexual Activity    Alcohol use: Yes     Alcohol/week: 3.0 standard drinks of alcohol     Types: 3 Shots of liquor per week     Comment: SOCIAL, 2-3 per week    Drug use: Yes     Types: Cannabis     Comment: Cannabis  like 1-2X a year max.   Other Topics Concern    Caffeine Concern Yes    Stress Concern No    Weight Concern Yes     Comment: currently loosing weight.    Special Diet No    Exercise Yes     Comment: Not often    Seat Belt Yes         REVIEW OF SYSTEMS:   GENERAL: feels ill with sinus pressure and chest congestion  SKIN: no rashes  EYES:denies blurred vision or double vision  HEENT: congested; sore throat, ear pain, facial pain  LUNGS: denies shortness of breath with exertion; cough  CARDIOVASCULAR: denies chest pain on exertion  GI: no nausea or abdominal pain  NEURO: + headaches    EXAM:   /70   Pulse 80   Temp 97 °F (36.1 °C) (Temporal)   Resp 18   Ht 5' 8\" (1.727 m)   Wt 234 lb (106.1 kg)   SpO2 98%   BMI 35.58 kg/m²   GENERAL: well developed, well nourished,in no apparent distress  SKIN: no rashes,no suspicious lesions  EYES:PERRL, EOMI,conjunctiva are clear  HEENT: atraumatic, normocephalic,ears and throat are clear; + maxillary but no frontal sinus tenderness  NECK: supple,no adenopathy  LUNGS: clear to auscultation, no r/r/w  CARDIO: RRR without murmur  GI: good BS's,no masses, HSM or tenderness    ASSESSMENT AND PLAN:   Lionel Velazquez is a 53 year old male who presents with Sinusitis. PLAN:  augmentin 875/125mg BID x 10 days .  Saline Rinse.  Tylenol or motrin as needed.  Antihistamine prn.  Fluids.  Probiotics advised.  Take abx with food.  Side effects discussed.  The patient indicates understanding of these issues and agrees to the plan.  The patient is asked to return if sx's persist or worsen.

## 2024-02-19 RX ORDER — LEVOTHYROXINE SODIUM 88 UG/1
TABLET ORAL
Qty: 90 TABLET | Refills: 0 | Status: SHIPPED | OUTPATIENT
Start: 2024-02-19

## 2024-02-19 NOTE — TELEPHONE ENCOUNTER
LOV: 10/27/2023    Last Refill:   Medication Quantity Refills Start End   LEVOTHYROXINE 88 MCG Oral Tab 90 tablet 0 11/21/2023 --     RTC: 04/27/2024    Protocol: passed

## 2024-02-25 DIAGNOSIS — E78.2 MIXED HYPERLIPIDEMIA: ICD-10-CM

## 2024-02-26 RX ORDER — ATORVASTATIN CALCIUM 40 MG/1
TABLET, FILM COATED ORAL
Qty: 90 TABLET | Refills: 0 | Status: SHIPPED | OUTPATIENT
Start: 2024-02-26

## 2024-02-26 NOTE — TELEPHONE ENCOUNTER
Last office visit: 2/12/2024   Protocol: pass  Requested medication(s) are due for refill today: yes  Requested medication(s) are on the active medication list same strength, form, dose/ sig: yes  Requested medication(s) are managed by provider: yes  Patient has already received a courtsey refill: no    NOV: none

## 2024-03-04 ENCOUNTER — OFFICE VISIT (OUTPATIENT)
Dept: FAMILY MEDICINE CLINIC | Facility: CLINIC | Age: 54
End: 2024-03-04
Payer: COMMERCIAL

## 2024-03-04 VITALS
TEMPERATURE: 98 F | RESPIRATION RATE: 18 BRPM | BODY MASS INDEX: 35.16 KG/M2 | HEART RATE: 87 BPM | OXYGEN SATURATION: 95 % | HEIGHT: 68 IN | WEIGHT: 232 LBS | DIASTOLIC BLOOD PRESSURE: 88 MMHG | SYSTOLIC BLOOD PRESSURE: 128 MMHG

## 2024-03-04 DIAGNOSIS — J01.01 ACUTE RECURRENT MAXILLARY SINUSITIS: Primary | ICD-10-CM

## 2024-03-04 DIAGNOSIS — H65.02 NON-RECURRENT ACUTE SEROUS OTITIS MEDIA OF LEFT EAR: ICD-10-CM

## 2024-03-04 PROCEDURE — 99214 OFFICE O/P EST MOD 30 MIN: CPT | Performed by: FAMILY MEDICINE

## 2024-03-04 RX ORDER — METHYLPREDNISOLONE 4 MG/1
TABLET ORAL
Qty: 1 EACH | Refills: 0 | Status: SHIPPED | OUTPATIENT
Start: 2024-03-04

## 2024-03-04 RX ORDER — DOXYCYCLINE HYCLATE 100 MG
100 TABLET ORAL 2 TIMES DAILY
Qty: 20 TABLET | Refills: 0 | Status: SHIPPED | OUTPATIENT
Start: 2024-03-04 | End: 2024-03-14

## 2024-03-04 NOTE — PROGRESS NOTES
HPI:   Lionel Velazquez is a 53 year old male who presents for upper respiratory symptoms for  5  days. Patient reports congestion, ear pain, sinus pain, OTC cold meds have not been helping, prior history of sinusitis, headaches, left ear pain and difficulty hearing from left ear, denies fever.    Current Outpatient Medications   Medication Sig Dispense Refill    Doxycycline Hyclate 100 MG Oral Tab Take 1 tablet (100 mg total) by mouth 2 (two) times daily for 10 days. 20 tablet 0    methylPREDNISolone (MEDROL) 4 MG Oral Tablet Therapy Pack As directed. 1 each 0    ATORVASTATIN 40 MG Oral Tab TAKE 1 TABLET BY MOUTH EVERY DAY 90 tablet 0    LEVOTHYROXINE 88 MCG Oral Tab TAKE 1 TABLET BY MOUTH ONCE DAILY BEFORE BREAKFAST 90 tablet 0    methylPREDNISolone (MEDROL) 4 MG Oral Tablet Therapy Pack As directed. 1 each 0    testosterone cypionate 200 mg/mL Intramuscular Solution Inject 0.6 mL (120 mg total) into the muscle every 14 (fourteen) days. 6 mL 0    azelastine 137 MCG/SPRAY Nasal Solution SPRAY 1 SPRAY INTO EACH NOSTRIL TWICE A DAY 1 each 1    clonazePAM 1 MG Oral Tab TAKE 1 TABLET BY MOUTH NIGHTLY AS NEEDED FOR ANXIETY 30 tablet 3    amLODIPine 10 MG Oral Tab Take 1 tablet (10 mg total) by mouth every evening. 90 tablet 1    Triamterene-HCTZ 37.5-25 MG Oral Tab Take 1 tablet by mouth daily. 90 tablet 1    Diroximel Fumarate (VUMERITY) 231 MG Oral Capsule Delayed Release Take 2 capsules by mouth 2 (two) times daily. 360 capsule 2    Syringe/Needle, Disp, (SYRINGE LUER LOCK) 25G X 1-1/2\" 3 ML Does not apply Misc 1 each every 14 (fourteen) days. Pt using terumo luer lock syringes 100 each 0    Syringe/Needle, Disp, (BD LUER-LOCK SYRINGE) 18G X 1-1/2\" 3 ML Does not apply Misc 1 injection every 14 days 100 each 0    aspirin 81 MG Oral Tab Take 1 tablet (81 mg total) by mouth daily.      Syringe/Needle, Disp, (BD LUER-IDANIA SYRINGE) 25G X 1\" 3 ML Does not apply Misc To inject testosterone IM q 2 weeks 10 each 1     FLUTICASONE PROPIONATE, NASAL, (FLONASE) 50 MCG/ACT Nasal Suspension daily      Fexofenadine HCl (ALLEGRA) 180 MG Oral Tab Take  by mouth daily as needed.        Past Medical History:   Diagnosis Date    Abdominal hernia     Anxiety     Arthritis     Bloating     Chronic rhinitis     Heartburn     High cholesterol     Hyperlipidemia 2015    HYPOTHYROIDISM     Hypothyroidism 2005    hashimoto's    Multiple sclerosis (HCC)     Obesity     Fat but loosing weight    Other abnormal glucose     Loss of peripheral vision    OTHER DISEASES     hypogonadism    Pain in joints     Shingles     Gold Rasmussen    Sleep apnea no idea    Sleep disturbance     Thyroid disease     Unspecified essential hypertension     Wears glasses     Weight gain       Past Surgical History:   Procedure Laterality Date    ARTHROSCOPY OF JOINT UNLISTED      yoav shoulder    COLONOSCOPY      HERNIA SURGERY  2007,11/2009    x3    OTHER SURGICAL HISTORY  2008    SINUS x2      Family History   Problem Relation Age of Onset    Cancer Father         GE junction    Alcohol and Other Disorders Associated Father     Colon Cancer Father     Hypertension Father     Lipids Father     Heart Disorder Father     Cancer Maternal Grandmother         breast    Heart Disorder Maternal Grandfather     Cancer Maternal Grandfather     Heart Attack Maternal Grandfather     Heart Attack Paternal Grandmother     Heart Disorder Paternal Grandmother     Cancer Paternal Grandfather     Cancer Paternal Uncle     Lipids Brother       Social History     Socioeconomic History    Marital status:    Tobacco Use    Smoking status: Former     Packs/day: 1.00     Years: 20.00     Additional pack years: 0.00     Total pack years: 20.00     Types: Cigarettes     Quit date: 2000     Years since quittin.1    Smokeless tobacco: Never   Vaping Use    Vaping Use: Never used   Substance and Sexual Activity    Alcohol use: Yes     Alcohol/week: 3.0 standard drinks of  alcohol     Types: 3 Shots of liquor per week     Comment: SOCIAL, 2-3 per week    Drug use: Yes     Types: Cannabis     Comment: Cannabis like 1-2X a year max.   Other Topics Concern    Caffeine Concern Yes    Stress Concern No    Weight Concern Yes     Comment: currently loosing weight.    Special Diet No    Exercise Yes     Comment: Not often    Seat Belt Yes         REVIEW OF SYSTEMS:   GENERAL: feels well otherwise  SKIN: no rashes  EYES:denies blurred vision or double vision  HEENT: congested; denies sore throat, + left ear pain and facial pain  LUNGS: denies shortness of breath with exertion; denies cough  CARDIOVASCULAR: denies chest pain on exertion  GI: no nausea or abdominal pain  NEURO: + headaches    EXAM:   /88   Pulse 87   Temp 97.5 °F (36.4 °C) (Temporal)   Resp 18   Ht 5' 8\" (1.727 m)   Wt 232 lb (105.2 kg)   SpO2 95%   BMI 35.28 kg/m²   GENERAL: well developed, well nourished,in no apparent distress  SKIN: no rashes,no suspicious lesions  EYES:PERRL, EOMI,conjunctiva are clear  HEENT: atraumatic, normocephalic, left TM erythematous and bulging with no exudate; + maxillary but no frontal sinus tenderness  NECK: supple,no adenopathy  LUNGS: clear to auscultation, no r/r/w  CARDIO: RRR without murmur  GI: good BS's,no masses, HSM or tenderness    ASSESSMENT AND PLAN:   Lionel Velazquez is a 53 year old male who presents with Sinusitis and left serous Otitis Media. PLAN:  doxycycline 100mg BID x 10 days and medrol dose hue .  Saline Rinse.  Tylenol or motrin as needed.  Antihistamine prn.  Fluids.  Probiotics advised.  Take abx with food.  Side effects discussed.  The patient indicates understanding of these issues and agrees to the plan.  The patient is asked to return if sx's persist or worsen.

## 2024-03-07 ENCOUNTER — PATIENT MESSAGE (OUTPATIENT)
Dept: FAMILY MEDICINE CLINIC | Facility: CLINIC | Age: 54
End: 2024-03-07

## 2024-03-07 RX ORDER — LEVOFLOXACIN 500 MG/1
500 TABLET, FILM COATED ORAL DAILY
Qty: 10 TABLET | Refills: 0 | Status: SHIPPED | OUTPATIENT
Start: 2024-03-07 | End: 2024-03-17

## 2024-03-24 DIAGNOSIS — F41.1 GENERALIZED ANXIETY DISORDER: ICD-10-CM

## 2024-03-26 RX ORDER — CLONAZEPAM 1 MG/1
1 TABLET ORAL NIGHTLY PRN
Qty: 30 TABLET | Refills: 0 | Status: SHIPPED | OUTPATIENT
Start: 2024-03-26

## 2024-03-26 NOTE — TELEPHONE ENCOUNTER
LOV:10-27-23    Last Refill:11-27-23      RTC:6 months pending lab results         Please sign if appropriate

## 2024-04-05 DIAGNOSIS — J30.1 ALLERGIC RHINITIS DUE TO POLLEN: ICD-10-CM

## 2024-04-05 RX ORDER — AZELASTINE HYDROCHLORIDE 137 UG/1
1 SPRAY, METERED NASAL 2 TIMES DAILY
Qty: 1 EACH | Refills: 1 | Status: SHIPPED | OUTPATIENT
Start: 2024-04-05

## 2024-04-11 DIAGNOSIS — R79.89 LOW TESTOSTERONE IN MALE: ICD-10-CM

## 2024-04-11 DIAGNOSIS — E29.1 MALE HYPOGONADISM: ICD-10-CM

## 2024-04-11 RX ORDER — TESTOSTERONE CYPIONATE 200 MG/ML
120 INJECTION, SOLUTION INTRAMUSCULAR
Qty: 2 ML | Refills: 0 | Status: SHIPPED | OUTPATIENT
Start: 2024-04-11

## 2024-04-11 NOTE — TELEPHONE ENCOUNTER
Requested Prescriptions     Pending Prescriptions Disp Refills    TESTOSTERONE CYPIONATE 200 mg/mL Intramuscular Solution [Pharmacy Med Name: TESTOSTERONE  MG/ML] 6 mL 0     Sig: INJECT 0.6 ML (120 MG TOTAL) INTO THE MUSCLE EVERY 14 (FOURTEEN) DAYS.     Last refill 1/24/24 6mL  LOV 3/4/24 - acute  LOV cpx 10/27/23  Next office visit due on or around 4/27/24  No future appointments.  Reminder letter sent to patient - due for 6 month appt

## 2024-04-19 ENCOUNTER — TELEPHONE (OUTPATIENT)
Dept: FAMILY MEDICINE CLINIC | Facility: CLINIC | Age: 54
End: 2024-04-19

## 2024-04-19 DIAGNOSIS — E29.1 MALE HYPOGONADISM: Primary | ICD-10-CM

## 2024-04-19 DIAGNOSIS — G35 MULTIPLE SCLEROSIS (HCC): ICD-10-CM

## 2024-04-19 DIAGNOSIS — I10 ESSENTIAL HYPERTENSION WITH GOAL BLOOD PRESSURE LESS THAN 130/80: ICD-10-CM

## 2024-04-19 DIAGNOSIS — E03.8 HYPOTHYROIDISM DUE TO HASHIMOTO'S THYROIDITIS: ICD-10-CM

## 2024-04-19 DIAGNOSIS — E06.3 HYPOTHYROIDISM DUE TO HASHIMOTO'S THYROIDITIS: ICD-10-CM

## 2024-04-19 DIAGNOSIS — E78.2 MIXED HYPERLIPIDEMIA: ICD-10-CM

## 2024-04-19 RX ORDER — DIROXIMEL FUMARATE 231 MG/1
2 CAPSULE ORAL 2 TIMES DAILY
Qty: 360 CAPSULE | Refills: 0 | Status: SHIPPED | OUTPATIENT
Start: 2024-04-19

## 2024-04-19 NOTE — TELEPHONE ENCOUNTER
LM for pt to cb  Last apt in march was for acute, so was the vs prior.    Last vs for px was in oct so he is about due for med check.

## 2024-04-19 NOTE — TELEPHONE ENCOUNTER
Sent the patient a [employer] message to make an appointment for further medication refills.       Medication: VUMERITY 231 MG Oral Capsule Delayed Release      Date of last refill: 06/01/20223 (#360/2)  [place]     Last office visit: 05/31/2023  Due back to clinic per last office note:  Not stated  Date next office visit scheduled:    No future appointments.        Last OV note recommendation:    Assessment:  This is a 52 y/o male with multiple sclerosis. Continue Vumerity.      Plan:  1. Multiple Sclerosis  - continue Vumerity  - LP with >10 oligoclonal bands and MRI with evidence of demyelinating disease  - LFTs are stable    - RTC in 6 months  - repeat MRI brain and orbits     2. Frequent falls and h/o lumbar radiculopathy  - MRI L spine     Paul Lincoln DO  Neurology

## 2024-04-19 NOTE — TELEPHONE ENCOUNTER
Pt called back.    He is referring to his testosterone refill from 4/11    Per chart last vs in march was for acute    I explained he is about due for med check per 10/27 notes during his px    Sts he wished the Dr would have combined these at his march vs   Voiced frustration in needing an appt d/t cost with his insurance/deductible    I advised I understood but we do need to see him if managing medications    Pt agrees to appt-asks to be transferred to book, call transferred    Asking for labs prior to appt.    I pended cmp/lipid/testosterone/tsh/free t4 if you agree?    Please approve if appropriate. Thanks.

## 2024-04-19 NOTE — TELEPHONE ENCOUNTER
Patient states he did not receive full refill and was told he needs to make appointment. Patient last seen in March. Patient would like some clarification.

## 2024-04-21 ENCOUNTER — PATIENT MESSAGE (OUTPATIENT)
Dept: FAMILY MEDICINE CLINIC | Facility: CLINIC | Age: 54
End: 2024-04-21

## 2024-04-22 NOTE — TELEPHONE ENCOUNTER
See TE from last week on this, he is referring to the lab prior to even having the injection/appt here.    Patient seen and examined during AM PICU Bedside rounds on 1/2/2019.  I agree with the resident PE and A/P above with any additions or changes noted below. Patient seen and examined during AM PICU Bedside rounds on 1/2/2019.  I agree with the resident PE and A/P above with any additions or changes noted below.    Patient with multilobar pneumonia with hypoxia and respiratory failure, who had improved WOB and oxygenation on Nasal BiPAP, however, she did not trigger machine well in awake state.  Her pathophysiology likely includes mucus plugging as R side looks more like atelectasis.  Patient transitioned to HFNC at 35 L flow and supplemental O2 and HNS nebs for aggressive pulmonary toilet instituted.  Will monitor for normovolemia, wean respiratory support as tolerated.  DC Dexmedetomidine on HFNC as patient tolerating well.

## 2024-04-23 ENCOUNTER — LAB ENCOUNTER (OUTPATIENT)
Dept: LAB | Age: 54
End: 2024-04-23
Attending: FAMILY MEDICINE
Payer: COMMERCIAL

## 2024-04-23 ENCOUNTER — OFFICE VISIT (OUTPATIENT)
Dept: NEUROLOGY | Facility: CLINIC | Age: 54
End: 2024-04-23
Payer: COMMERCIAL

## 2024-04-23 VITALS
RESPIRATION RATE: 16 BRPM | SYSTOLIC BLOOD PRESSURE: 117 MMHG | WEIGHT: 233 LBS | BODY MASS INDEX: 35 KG/M2 | DIASTOLIC BLOOD PRESSURE: 67 MMHG | HEART RATE: 73 BPM

## 2024-04-23 DIAGNOSIS — R73.9 ELEVATED BLOOD SUGAR: ICD-10-CM

## 2024-04-23 DIAGNOSIS — E29.1 MALE HYPOGONADISM: ICD-10-CM

## 2024-04-23 DIAGNOSIS — E06.3 HYPOTHYROIDISM DUE TO HASHIMOTO'S THYROIDITIS: ICD-10-CM

## 2024-04-23 DIAGNOSIS — I10 ESSENTIAL HYPERTENSION WITH GOAL BLOOD PRESSURE LESS THAN 130/80: ICD-10-CM

## 2024-04-23 DIAGNOSIS — G35 RELAPSING REMITTING MULTIPLE SCLEROSIS (HCC): Primary | ICD-10-CM

## 2024-04-23 DIAGNOSIS — E78.2 MIXED HYPERLIPIDEMIA: ICD-10-CM

## 2024-04-23 DIAGNOSIS — E03.8 HYPOTHYROIDISM DUE TO HASHIMOTO'S THYROIDITIS: ICD-10-CM

## 2024-04-23 LAB
ALBUMIN SERPL-MCNC: 3.9 G/DL (ref 3.4–5)
ALBUMIN/GLOB SERPL: 1.2 {RATIO} (ref 1–2)
ALP LIVER SERPL-CCNC: 40 U/L
ALT SERPL-CCNC: 43 U/L
ANION GAP SERPL CALC-SCNC: 4 MMOL/L (ref 0–18)
AST SERPL-CCNC: 23 U/L (ref 15–37)
BASOPHILS # BLD AUTO: 0.04 X10(3) UL (ref 0–0.2)
BASOPHILS NFR BLD AUTO: 0.9 %
BILIRUB SERPL-MCNC: 0.6 MG/DL (ref 0.1–2)
BUN BLD-MCNC: 16 MG/DL (ref 9–23)
CALCIUM BLD-MCNC: 9.1 MG/DL (ref 8.5–10.1)
CHLORIDE SERPL-SCNC: 107 MMOL/L (ref 98–112)
CHOLEST SERPL-MCNC: 126 MG/DL (ref ?–200)
CO2 SERPL-SCNC: 29 MMOL/L (ref 21–32)
CREAT BLD-MCNC: 1.27 MG/DL
EGFRCR SERPLBLD CKD-EPI 2021: 67 ML/MIN/1.73M2 (ref 60–?)
EOSINOPHIL # BLD AUTO: 0.09 X10(3) UL (ref 0–0.7)
EOSINOPHIL NFR BLD AUTO: 2 %
ERYTHROCYTE [DISTWIDTH] IN BLOOD BY AUTOMATED COUNT: 14.1 %
FASTING PATIENT LIPID ANSWER: YES
FASTING STATUS PATIENT QL REPORTED: YES
GLOBULIN PLAS-MCNC: 3.3 G/DL (ref 2.8–4.4)
GLUCOSE BLD-MCNC: 104 MG/DL (ref 70–99)
HCT VFR BLD AUTO: 53.9 %
HDLC SERPL-MCNC: 35 MG/DL (ref 40–59)
HGB BLD-MCNC: 18.2 G/DL
IMM GRANULOCYTES # BLD AUTO: 0.02 X10(3) UL (ref 0–1)
IMM GRANULOCYTES NFR BLD: 0.4 %
LDLC SERPL CALC-MCNC: 65 MG/DL (ref ?–100)
LYMPHOCYTES # BLD AUTO: 0.38 X10(3) UL (ref 1–4)
LYMPHOCYTES NFR BLD AUTO: 8.3 %
MCH RBC QN AUTO: 30.4 PG (ref 26–34)
MCHC RBC AUTO-ENTMCNC: 33.8 G/DL (ref 31–37)
MCV RBC AUTO: 90 FL
MONOCYTES # BLD AUTO: 0.54 X10(3) UL (ref 0.1–1)
MONOCYTES NFR BLD AUTO: 11.8 %
NEUTROPHILS # BLD AUTO: 3.49 X10 (3) UL (ref 1.5–7.7)
NEUTROPHILS # BLD AUTO: 3.49 X10(3) UL (ref 1.5–7.7)
NEUTROPHILS NFR BLD AUTO: 76.6 %
NONHDLC SERPL-MCNC: 91 MG/DL (ref ?–130)
OSMOLALITY SERPL CALC.SUM OF ELEC: 291 MOSM/KG (ref 275–295)
PLATELET # BLD AUTO: 177 10(3)UL (ref 150–450)
POTASSIUM SERPL-SCNC: 3.6 MMOL/L (ref 3.5–5.1)
PROT SERPL-MCNC: 7.2 G/DL (ref 6.4–8.2)
RBC # BLD AUTO: 5.99 X10(6)UL
SODIUM SERPL-SCNC: 140 MMOL/L (ref 136–145)
T4 FREE SERPL-MCNC: 1 NG/DL (ref 0.8–1.7)
TESTOST SERPL-MCNC: 158.26 NG/DL
TRIGL SERPL-MCNC: 148 MG/DL (ref 30–149)
TSI SER-ACNC: 3.02 MIU/ML (ref 0.36–3.74)
VLDLC SERPL CALC-MCNC: 22 MG/DL (ref 0–30)
WBC # BLD AUTO: 4.6 X10(3) UL (ref 4–11)

## 2024-04-23 PROCEDURE — 84443 ASSAY THYROID STIM HORMONE: CPT

## 2024-04-23 PROCEDURE — 84403 ASSAY OF TOTAL TESTOSTERONE: CPT

## 2024-04-23 PROCEDURE — 80053 COMPREHEN METABOLIC PANEL: CPT

## 2024-04-23 PROCEDURE — 99213 OFFICE O/P EST LOW 20 MIN: CPT | Performed by: OTHER

## 2024-04-23 PROCEDURE — 80061 LIPID PANEL: CPT

## 2024-04-23 PROCEDURE — 84439 ASSAY OF FREE THYROXINE: CPT

## 2024-04-23 PROCEDURE — 83036 HEMOGLOBIN GLYCOSYLATED A1C: CPT

## 2024-04-23 PROCEDURE — 85025 COMPLETE CBC W/AUTO DIFF WBC: CPT

## 2024-04-23 RX ORDER — DOXYCYCLINE HYCLATE 100 MG
TABLET ORAL
COMMUNITY
Start: 2024-04-18

## 2024-04-23 NOTE — PROGRESS NOTES
Neurology H&P    Lionel Velazquez Patient Status:  No patient class for patient encounter    1970 MRN [id number]   Location [facility], [facility] Attending No att. providers found   Hosp Day # 0 PCP ESTELLE REA,      Subjective:  Initial Clinic Visit 2017  Lionel Velazquez is a(n) 53 year old male with a PMH significant for HTN, polycythemia, hypothyroid disease. He comes to clinic for evaluation of vision blurriness. He states that about 1 month ago he woke up and had blurry vision in his L eye. He states that he could not read or see much of anything at all out of that eye. It was not dark but he couldn't read through it etc. His R eye was normal. This last for 1.5 days and then resolved. He went to the ED and had an MRI which as noted below shows multiple lesions including one that appears to be demyelinating. He states that has intermittent vision blurring. He also reports that his wife occasionally tells him his speech is slurring. He himself does not notice this, but he does tell me that he occasionally has a jumble phrase which he does notice. He also states that he has occasional numbness in his BUE but states that this may be due to bilateral rotator cuff tears and significant UE injuries sustained during weight lifting. He has not noted any red color desaturation in his eyes. He denies any double vision. He states that he has had a recent normal opthalmology. He denies any significant new numbness weakness or tingling. He does have and aunt and cousin with MS. He denies any falls. He denies any other complaints at all. He denies any headaches. He denies any painful eye movements.    Interim History:   Pt was last seen in clinic on 23. Since that time he has been taking Vumerity. He is doing well in terms of MS. He states that he has not had any new numbness, weakness or tingling. He is tolerating Vumerity well.     Current Medications:  Current Outpatient  Medications   Medication Sig Dispense Refill    Diroximel Fumarate (VUMERITY) 231 MG Oral Capsule Delayed Release Take 2 capsules by mouth 2 (two) times daily. 360 capsule 0    testosterone cypionate 200 mg/mL Intramuscular Solution Inject 0.6 mL (120 mg total) into the muscle every 14 (fourteen) days. 2 mL 0    azelastine 137 MCG/SPRAY Nasal Solution 1 spray by Nasal route 2 (two) times daily. 1 each 1    clonazePAM 1 MG Oral Tab TAKE 1 TABLET BY MOUTH NIGHTLY AS NEEDED FOR ANXIETY. 30 tablet 0    methylPREDNISolone (MEDROL) 4 MG Oral Tablet Therapy Pack As directed. 1 each 0    ATORVASTATIN 40 MG Oral Tab TAKE 1 TABLET BY MOUTH EVERY DAY 90 tablet 0    LEVOTHYROXINE 88 MCG Oral Tab TAKE 1 TABLET BY MOUTH ONCE DAILY BEFORE BREAKFAST 90 tablet 0    methylPREDNISolone (MEDROL) 4 MG Oral Tablet Therapy Pack As directed. 1 each 0    amLODIPine 10 MG Oral Tab Take 1 tablet (10 mg total) by mouth every evening. 90 tablet 1    Triamterene-HCTZ 37.5-25 MG Oral Tab Take 1 tablet by mouth daily. 90 tablet 1    Syringe/Needle, Disp, (SYRINGE LUER LOCK) 25G X 1-1/2\" 3 ML Does not apply Misc 1 each every 14 (fourteen) days. Pt using terumo luer lock syringes 100 each 0    Syringe/Needle, Disp, (BD LUER-LOCK SYRINGE) 18G X 1-1/2\" 3 ML Does not apply Misc 1 injection every 14 days 100 each 0    aspirin 81 MG Oral Tab Take 1 tablet (81 mg total) by mouth daily.      Syringe/Needle, Disp, (BD LUER-IDANIA SYRINGE) 25G X 1\" 3 ML Does not apply Misc To inject testosterone IM q 2 weeks 10 each 1    FLUTICASONE PROPIONATE, NASAL, (FLONASE) 50 MCG/ACT Nasal Suspension daily      Fexofenadine HCl (ALLEGRA) 180 MG Oral Tab Take  by mouth daily as needed.         Problem List:  Patient Active Problem List   Diagnosis    Polycythemia    Male hypogonadism    H/O Hashimoto thyroiditis    Hypertension    Special screening for malignant neoplasm of prostate    Hyperlipidemia    Hypothyroidism due to acquired atrophy of thyroid    Acute recurrent  maxillary sinusitis    FH: gastric cancer    Blurry vision, left eye    Multiple sclerosis (HCC)    Testicular hypofunction    Low testosterone in male    History of adenomatous polyp of colon    Mild chronic gastritis    Tubular adenoma    Benign essential hypertension    Benign prostatic hyperplasia    Diverticulosis of colon    Hypothyroidism    Incomplete right bundle branch block    Internal hemorrhoids    Lumbar radiculopathy    Polycythemia vera (HCC)    Impotence of organic origin    Morbid obesity (HCC)    Relapsing remitting multiple sclerosis (HCC)    Well adult health check    Benign neoplasm of transverse colon    Benign neoplasm of sigmoid colon       PMHx:  Past Medical History:    Abdominal hernia    Anxiety    Arthritis    Bloating    Chronic rhinitis    Heartburn    High cholesterol    Hyperlipidemia    HYPOTHYROIDISM    Hypothyroidism    hashimoto's    Multiple sclerosis (HCC)    Obesity    Fat but loosing weight    Other abnormal glucose    Loss of peripheral vision    OTHER DISEASES    hypogonadism    Pain in joints    Shingles    Gold Hunt    Sleep apnea    Sleep disturbance    Thyroid disease    Unspecified essential hypertension    Wears glasses    Weight gain       PSHx:  Past Surgical History:   Procedure Laterality Date    Arthroscopy of joint unlisted      yoav shoulder    Colonoscopy      Hernia surgery  2007,11/2009    x3    Other surgical history  2008    SINUS x2       SocHx:  Social History     Socioeconomic History    Marital status:    Tobacco Use    Smoking status: Former     Current packs/day: 0.00     Average packs/day: 1 pack/day for 20.0 years (20.0 ttl pk-yrs)     Types: Cigarettes     Start date: 1980     Quit date: 2000     Years since quittin.2    Smokeless tobacco: Never   Vaping Use    Vaping status: Never Used   Substance and Sexual Activity    Alcohol use: Yes     Alcohol/week: 3.0 standard drinks of alcohol     Types: 3 Shots of liquor per  week     Comment: SOCIAL, 2-3 per week    Drug use: Yes     Types: Cannabis     Comment: Cannabis like 1-2X a year max.   Other Topics Concern    Caffeine Concern Yes    Stress Concern No    Weight Concern Yes     Comment: currently loosing weight.    Special Diet No    Exercise Yes     Comment: Not often    Seat Belt Yes     Social Determinants of Health      Received from [facility]       Family History:  Family History   Problem Relation Age of Onset    Cancer Father         GE junction    Alcohol and Other Disorders Associated Father     Colon Cancer Father     Hypertension Father     Lipids Father     Heart Disorder Father     Cancer Maternal Grandmother         breast    Heart Disorder Maternal Grandfather     Cancer Maternal Grandfather     Heart Attack Maternal Grandfather     Heart Attack Paternal Grandmother     Heart Disorder Paternal Grandmother     Cancer Paternal Grandfather     Cancer Paternal Uncle     Lipids Brother          ROS:  10 point ROS completed and pertinent positives noted in HPI    Objective/Physical Exam:    Vital Signs:  There were no vitals taken for this visit.    Gen: Awake and in no apparent distress  HEENT: moist mucus membranes  Neck: Supple  Cardiovascular: Regular rate and rhythm, no murmur  Pulm: CTAB  GI: non-tender, normal bowel sounds  Skin: normal, dry  Extremities: No clubbing or cyanosis      Neurologic:  MENTAL STATUS: alert, ox3, normal attention, language and fund of knowledge.      CRANIAL NERVES II to XII: PERRLA, no ptosis or diplopia, EOM intact, loss of VF in bilateral lateral fields (states it is chronic since childhood), facial sensation intact, strong eye closure and lower facial muscles & jaw muscles; palate elevation intact, no dysarthria, no tongue fasciculations or atrophy, strong shoulder shrug.    MOTOR EXAMINATION: normal tone, no fasciculations, normal strength throughout in UEs and LEs    SENSORY EXAMINATION:  UE: intact to light  touch, pinprick intact  LE: intact to light touch, pinprick intact.    COORDINATION:  No dysmetria, or intention tremors    REFLEXES: 2+ at biceps, 2+ triceps, 2+ at patella    GAIT: normal stance, normal gait           Labs:           Imaging:  MRI Brain 8/16/17  CONCLUSION:    #1. No evidence of hemorrhage or acute or pleural infarction. No mass.  2. There are numerous foci of T2/flair hyperintensity in the periventricular deep white matter and subcortical white matter of the cerebrum for a patient of this age. The differential diagnosis would include demyelination, chronic microvascular change,   changes related to previous inflammatory or infectious disease. Demyelination is favored. No abnormal signal within the brainstem or cerebellum.     MRI Brain 7/2021    IMPRESSION:   1. Numerous T2/FLAIR hyperintense foci within the periventricular and subcortical white matter have minimally progressed from 2017, consistent with chronic demyelinating plaques. No abnormal enhancing lesions are identified to suggest active   demyelination.   2. Mild mucosal thickening within the ethmoid air cells.   3. Otherwise unremarkable pre and postcontrast brain and orbit MRI studies.      MRI C spine 7/2021  IMPRESSION:   1. No demyelinating lesions are identified in the cervical spinal cord. No cervical spinal cord abnormalities are identified.     2. Minimal to mild multilevel cervical spondylosis. Minimal disc bulges at C2-C3 and C3-C4. Mild asymmetric disc bulge to the left and minimal left-sided uncovertebral spurring at C4-C5. Mild asymmetric disc bulge to the right at C5-C6. Mild central disc    bulge at C6-C7. No significant cervical spinal or neural foraminal stenosis.     3. Cervical spine alignment is normal. No fracture.     MRI Brain and orbits 6/19/23  CONCLUSION:       1. No acute intracranial abnormality identified.  No discrete orbital abnormality is seen.  No specific MRI findings to suggest optic neuritis.      2.  Stable chronic demyelinating plaque burden in the supratentorial white matter.  No enhancing focus to suggest active demyelination.  No significant interval brain parenchymal volume loss.         Assessment:  This is a 54 y/o male with multiple sclerosis. Continue Vumerity.      Plan:  1. Multiple Sclerosis  - continue Vumerity  - LP with >10 oligoclonal bands and MRI with evidence of demyelinating disease  - LFTs are stable    - RTC in 6 months  - repeat MRI brain and orbits did not show any new lesions         Paul Lincoln, DO  Neurology

## 2024-04-23 NOTE — PATIENT INSTRUCTIONS
After your visit at the [facility] today,  please direct any follow up questions or medication needs to the staff in our [place] office so that your concerns may be promptly addressed.  We are available through [employer] or at the numbers below:    The phone number is:   [phone] option #1    The fax number is:  [phone]    Your pharmacy should also send any requests electronically to the [place] office.  Refill policies:    Allow 2-3 business days for refills; controlled substances may take longer.  Contact your pharmacy at least 5 days prior to running out of medication and have them send an electronic request or submit request through the “request refill” option in your [employer] account.  Refills are not addressed on weekends; covering physicians do not authorize routine medications on weekends.  No narcotics or controlled substances are refilled after noon on Fridays or by on call physicians.  By law, narcotics must be electronically prescribed.  A 30 day supply with no refills is the maximum allowed.  If your prescription is due for a refill, you may be due for a follow up appointment.  To best provide you care, patients receiving routine medications need to be seen at least once a year.  Patients receiving narcotic/controlled substance medications need to be seen at least once every 3 months.  In the event that your preferred pharmacy does not have the requested medication in stock (e.g. Backordered), it is your responsibility to find another pharmacy that has the requested medication available.  We will gladly send a new prescription to that pharmacy at your request.    Scheduling Tests:    If your physician has ordered radiology tests such as MRI or CT scans, please contact Central Scheduling at [phone] right away to schedule the test.  Once scheduled, the [facility] Centralized Referral Team will work with your insurance carrier to obtain pre-certification or prior authorization.   Depending on your insurance carrier, approval may take 3-10 days.  It is highly recommended patients assure they have received an authorization before having a test performed.  If test is done without insurance authorization, patient may be responsible for the entire amount billed.      Precertification and Prior Authorizations:  If your physician has recommended that you have a procedure or additional testing performed the [facility] Centralized Referral Team will contact your insurance carrier to obtain pre-certification or prior authorization.    You are strongly encouraged to contact your insurance carrier to verify that your procedure/test has been approved and is a COVERED benefit.  Although the [facility] Centralized Referral Team does its due diligence, the insurance carrier gives the disclaimer that \"Although the procedure is authorized, this does not guarantee payment.\"    Ultimately the patient is responsible for payment.   Thank you for your understanding in this matter.  Paperwork Completion:  If you require FMLA or disability paperwork for your recovery, please make sure to either drop it off or have it faxed to our office at [phone]. Be sure the form has your name and date of birth on it.  The form will be faxed to our Forms Department and they will complete it for you.  There is a 25$ fee for all forms that need to be filled out.  Please be aware there is a 10-14 day turnaround time.  You will need to sign a release of information (MARLIN) form if your paperwork does not come with one.  You may call the Forms Department with any questions at [phone].  Their fax number is [phone].

## 2024-04-24 ENCOUNTER — OFFICE VISIT (OUTPATIENT)
Dept: FAMILY MEDICINE CLINIC | Facility: CLINIC | Age: 54
End: 2024-04-24
Payer: COMMERCIAL

## 2024-04-24 VITALS
SYSTOLIC BLOOD PRESSURE: 120 MMHG | WEIGHT: 231 LBS | HEART RATE: 95 BPM | BODY MASS INDEX: 35.01 KG/M2 | RESPIRATION RATE: 16 BRPM | DIASTOLIC BLOOD PRESSURE: 88 MMHG | TEMPERATURE: 97 F | HEIGHT: 68 IN

## 2024-04-24 DIAGNOSIS — E78.2 MIXED HYPERLIPIDEMIA: ICD-10-CM

## 2024-04-24 DIAGNOSIS — R73.9 ELEVATED BLOOD SUGAR: Primary | ICD-10-CM

## 2024-04-24 DIAGNOSIS — E06.3 HYPOTHYROIDISM DUE TO HASHIMOTO'S THYROIDITIS: ICD-10-CM

## 2024-04-24 DIAGNOSIS — F41.1 GENERALIZED ANXIETY DISORDER: ICD-10-CM

## 2024-04-24 DIAGNOSIS — R79.89 LOW TESTOSTERONE IN MALE: ICD-10-CM

## 2024-04-24 DIAGNOSIS — I10 ESSENTIAL HYPERTENSION WITH GOAL BLOOD PRESSURE LESS THAN 130/80: ICD-10-CM

## 2024-04-24 DIAGNOSIS — D22.9 SUSPICIOUS NEVUS: ICD-10-CM

## 2024-04-24 DIAGNOSIS — E03.8 HYPOTHYROIDISM DUE TO HASHIMOTO'S THYROIDITIS: ICD-10-CM

## 2024-04-24 LAB
EST. AVERAGE GLUCOSE BLD GHB EST-MCNC: 111 MG/DL (ref 68–126)
HBA1C MFR BLD: 5.5 % (ref ?–5.7)

## 2024-04-24 PROCEDURE — 99215 OFFICE O/P EST HI 40 MIN: CPT | Performed by: FAMILY MEDICINE

## 2024-04-24 RX ORDER — CLONAZEPAM 1 MG/1
1 TABLET ORAL NIGHTLY PRN
Qty: 30 TABLET | Refills: 3 | Status: SHIPPED | OUTPATIENT
Start: 2024-04-24

## 2024-04-24 RX ORDER — TESTOSTERONE CYPIONATE 200 MG/ML
200 INJECTION, SOLUTION INTRAMUSCULAR
COMMUNITY
Start: 2024-04-24

## 2024-04-25 NOTE — PROGRESS NOTES
[facility] Family Medicine Office Note  Chief Complaint:   Chief Complaint   Patient presents with    Medication Follow-Up       HPI:   This is a 53 year old male coming in for:    Elevated blood sugar - A1c 5.5 and no signs of diabetes.  States diabetes does run in the family.  Low testosterone - recent testosterone level is normal.  He is taking 200mg IM every 14 days.  Denies any side effects.  Feels energy levels are stable.  NICHOLE - stable.  Needs refill of klonopin.  Denies any side effects.  No recent panic attacks.  HTN - Patient presents for recheck of his hypertension. Pt has been taking medications as instructed, no medication side effects, home BP monitoring in the range of 120's systolic and 80's diastolic.  HL - taking atorvastatin.  Well controlled.  No side effects including myalgias.  Denies any chest pain or SOB.  Hypothyroidism - Pt had a history of hypothyroidism and here to recheck. Has been tolerating the medication well. Last TSH was recent. Denies any shakiness, palpitations, increased BM's or wgt loss. Denies any fatigue, wgt gain.      Past Medical History:    Abdominal hernia    Anxiety    Arthritis    Bloating    Chronic rhinitis    Heartburn    High cholesterol    Hyperlipidemia    HYPOTHYROIDISM    Hypothyroidism    hashimoto's    Multiple sclerosis (HCC)    Obesity    Fat but loosing weight    Other abnormal glucose    Loss of peripheral vision    OTHER DISEASES    hypogonadism    Pain in joints    Shingles    Gold Hunt    Sleep apnea    Sleep disturbance    Thyroid disease    Unspecified essential hypertension    Wears glasses    Weight gain     Past Surgical History:   Procedure Laterality Date    Arthroscopy of joint unlisted      yoav shoulder    Colonoscopy      Hernia surgery  8/2007,11/2009    x3    Other surgical history  09/2008    SINUS x2     Social History:  Social History     Socioeconomic History    Marital status:    Tobacco Use    Smoking status: Former      Current packs/day: 0.00     Average packs/day: 1 pack/day for 20.0 years (20.0 ttl pk-yrs)     Types: Cigarettes     Start date: 1980     Quit date: 2000     Years since quittin.2    Smokeless tobacco: Never   Vaping Use    Vaping status: Never Used   Substance and Sexual Activity    Alcohol use: Yes     Alcohol/week: 3.0 standard drinks of alcohol     Types: 3 Shots of liquor per week     Comment: SOCIAL, 2-3 per week    Drug use: Yes     Types: Cannabis     Comment: Cannabis like 1-2X a year max.   Other Topics Concern    Caffeine Concern Yes    Stress Concern No    Weight Concern Yes     Comment: currently loosing weight.    Special Diet No    Exercise Yes     Comment: Not often    Seat Belt Yes     Social Determinants of Health      [facility] from [facility]     Family History:  Family History   Problem Relation Age of Onset    Cancer Father         GE junction    Alcohol and Other Disorders Associated Father     Colon Cancer Father     Hypertension Father     Lipids Father     Heart Disorder Father     Cancer Maternal Grandmother         breast    Heart Disorder Maternal Grandfather     Cancer Maternal Grandfather     Heart Attack Maternal Grandfather     Heart Attack Paternal Grandmother     Heart Disorder Paternal Grandmother     Cancer Paternal Grandfather     Cancer Paternal Uncle     Lipids Brother      Allergies:  Allergies   Allergen Reactions    Dust     Grass     Losartan      Lightheaded,sob,muscle weakness    Mold     Penicillins RASH    Reglan [Metoclopramide] OTHER (SEE COMMENTS)     Pt starts to feel hot and flushed and trouble breathing    Seasonal OTHER (SEE COMMENTS)     Sinus congestion     Current Meds:  Current Outpatient Medications   Medication Sig Dispense Refill    testosterone cypionate 200 mg/mL Intramuscular Solution Inject 1 mL (200 mg total) into the muscle every 14 (fourteen) days.      clonazePAM 1 MG Oral Tab Take 1 tablet (1 mg total) by mouth  nightly as needed for Anxiety. 30 tablet 3    Doxycycline Hyclate 100 MG Oral Tab TAKE 2 TABS ON DAY 1 AND THEN 1 TAB DAILY UNTIL GONE      Diroximel Fumarate (VUMERITY) 231 MG Oral Capsule Delayed Release Take 2 capsules by mouth 2 (two) times daily. 360 capsule 0    azelastine 137 MCG/SPRAY Nasal Solution 1 spray by Nasal route 2 (two) times daily. 1 each 1    ATORVASTATIN 40 MG Oral Tab TAKE 1 TABLET BY MOUTH EVERY DAY 90 tablet 0    LEVOTHYROXINE 88 MCG Oral Tab TAKE 1 TABLET BY MOUTH ONCE DAILY BEFORE BREAKFAST 90 tablet 0    amLODIPine 10 MG Oral Tab Take 1 tablet (10 mg total) by mouth every evening. 90 tablet 1    Triamterene-HCTZ 37.5-25 MG Oral Tab Take 1 tablet by mouth daily. 90 tablet 1    Syringe/Needle, Disp, (SYRINGE LUER LOCK) 25G X 1-1/2\" 3 ML Does not apply Misc 1 each every 14 (fourteen) days. Pt using terumo luer lock syringes 100 each 0    Syringe/Needle, Disp, (BD LUER-LOCK SYRINGE) 18G X 1-1/2\" 3 ML Does not apply Misc 1 injection every 14 days 100 each 0    aspirin 81 MG Oral Tab Take 1 tablet (81 mg total) by mouth daily.      Syringe/Needle, Disp, (BD LUER-IDANIA SYRINGE) 25G X 1\" 3 ML Does not apply Misc To inject testosterone IM q 2 weeks 10 each 1    FLUTICASONE PROPIONATE, NASAL, (FLONASE) 50 MCG/ACT Nasal Suspension daily      Fexofenadine HCl (ALLEGRA) 180 MG Oral Tab Take  by mouth daily as needed.        Counseling given: Not Answered       REVIEW OF SYSTEMS:   ROS:  CONSTITUTIONAL:  Denies any unusual weight gain/loss, fever, chills, weakness or fatigue.  HEENT:  Eyes:  Denies visual loss, blurred vision, double vision or yellow sclerae. Ears, Nose, Throat:  Denies hearing loss, sneezing, congestion, runny nose or sore throat.  CARDIOVASCULAR:  Denies chest pain, chest pressure or chest discomfort. No palpitations or edema.  Denies any dyspnea on exertion or at rest  RESPIRATORY:  Denies shortness of breath, cough  GASTROINTESTINAL:  Denies any abdominal pain, nausea,  vomiting  NEUROLOGICAL:  Denies headache, dizziness, syncope, numbness or tingling in the extremities.  MUSCULOSKELETAL:  Denies muscle, back pain, joint pain or stiffness.  PSYCHIATRIC:  Denies history of depression, + anxiety    EXAM:   /88   Pulse 95   Temp 97.3 °F (36.3 °C) (Temporal)   Resp 16   Ht 5' 8\" (1.727 m)   Wt 231 lb (104.8 kg)   BMI 35.12 kg/m²  Estimated body mass index is 35.12 kg/m² as calculated from the following:    Height as of this encounter: 5' 8\" (1.727 m).    Weight as of this encounter: 231 lb (104.8 kg).   Vital signs reviewed.Appears stated age, well groomed.  Physical Exam:  GEN:  Patient is alert and oriented x3, no apparent distress  HEAD:  Normocephalic, atraumatic  NECK:  Supple, no LAD  SKIN: + tiny round dark brown nonraised nevus on aurucle of right ear  LUNGS: clear to auscultation bilaterally, no rales/rhonchi/wheezing  HEART:  Regular rate and rhythm, no murmurs, rubs or gallops  ABDOMEN:  Soft, nondistended, nontender, bowel sounds normal in all 4 quadrants, no hepatosplenomegaly  EXTREMITIES:  Strength intact with 5/5 bilaterally upper and lower extremities, no edema noted  NEURO:  CN 2 - 12 grossly intact     ASSESSMENT AND PLAN:   1. Elevated blood sugar  -  A1c 5.5  -  continue low carb diet and exercise  - Hemoglobin A1C [E]; Future    2. Low testosterone in male  -  stable  -  continue testosterone IM 200mg every 14 days  - testosterone cypionate 200 mg/mL Intramuscular Solution; Inject 1 mL (200 mg total) into the muscle every 14 (fourteen) days.    3. Generalized anxiety disorder  -  stable  -  continue klonopin PRN  - clonazePAM 1 MG Oral Tab; Take 1 tablet (1 mg total) by mouth nightly as needed for Anxiety.  Dispense: 30 tablet; Refill: 3    4. Suspicious nevus  -  refer to dermatology for evaluation  - Derm Referral - In Network    5. Essential hypertension with goal blood pressure less than 130/80  -  controlled  -  cpm  -  renal function stable    6.  Mixed hyperlipidemia  -  well controlled  -  continue atorvastatin    7. Hypothyroidism due to Hashimoto's thyroiditis  -  TSH stable  -  continue synthroid 88mcg daily      Meds & Refills for this Visit:  Requested Prescriptions     Signed Prescriptions Disp Refills    clonazePAM 1 MG Oral Tab 30 tablet 3     Sig: Take 1 tablet (1 mg total) by mouth nightly as needed for Anxiety.       Health Maintenance:  Health Maintenance Due   Topic Date Due    Pneumococcal Vaccine: Birth to 64yrs (1 of 2 - PCV) Never done    Zoster Vaccines (1 of 2) Never done    COVID-19 Vaccine (4 - 2023-24 season) 09/01/2023       Patient/Caregiver Education: Patient/Caregiver Education: There are no barriers to learning. Medical education done.   Outcome: Patient verbalizes understanding. Patient is notified to call with any questions, complications, allergies, or worsening or changing symptoms.  Patient is to call with any side effects or complications from the treatments as a result of today.     Problem List:  Patient Active Problem List   Diagnosis    Polycythemia    Male hypogonadism    H/O Hashimoto thyroiditis    Hypertension    Special screening for malignant neoplasm of prostate    Hyperlipidemia    Hypothyroidism due to acquired atrophy of thyroid    Acute recurrent maxillary sinusitis    FH: gastric cancer    Blurry vision, left eye    Multiple sclerosis (HCC)    Testicular hypofunction    Low testosterone in male    History of adenomatous polyp of colon    Mild chronic gastritis    Tubular adenoma    Benign essential hypertension    Benign prostatic hyperplasia    Diverticulosis of colon    Hypothyroidism    Incomplete right bundle branch block    Internal hemorrhoids    Lumbar radiculopathy    Polycythemia vera (HCC)    Impotence of organic origin    Morbid obesity (HCC)    Relapsing remitting multiple sclerosis (HCC)    Well adult health check    Benign neoplasm of transverse colon    Benign neoplasm of sigmoid colon        ESTELLE REA, DO    Please note that portions of this note may have been completed with a voice recognition program. Efforts were made to edit the dictations but occasionally words are mis-transcribed.

## 2024-05-08 DIAGNOSIS — R79.89 LOW TESTOSTERONE IN MALE: ICD-10-CM

## 2024-05-08 RX ORDER — TESTOSTERONE CYPIONATE 200 MG/ML
INJECTION, SOLUTION INTRAMUSCULAR
Qty: 2 ML | Refills: 0 | Status: SHIPPED | OUTPATIENT
Start: 2024-05-08

## 2024-05-08 NOTE — TELEPHONE ENCOUNTER
Requested Prescriptions     Pending Prescriptions Disp Refills    TESTOSTERONE CYPIONATE 200 mg/mL Intramuscular Solution [Pharmacy Med Name: TESTOSTERONE  MG/ML] 2 mL 0     Sig: INJECT 0.6 ML (120 MG TOTAL) INTO THE MUSCLE EVERY 14 (FOURTEEN) DAYS.     Medication entered as historical  LOV 4/24/24  No future appointments.  NO refill protocol

## 2024-05-17 ENCOUNTER — TELEPHONE (OUTPATIENT)
Dept: NEUROLOGY | Facility: CLINIC | Age: 54
End: 2024-05-17

## 2024-05-17 DIAGNOSIS — G35 MULTIPLE SCLEROSIS (HCC): ICD-10-CM

## 2024-05-17 RX ORDER — DIROXIMEL FUMARATE 231 MG/1
2 CAPSULE ORAL 2 TIMES DAILY
Qty: 360 CAPSULE | Refills: 0 | OUTPATIENT
Start: 2024-05-17

## 2024-05-20 ENCOUNTER — PATIENT MESSAGE (OUTPATIENT)
Dept: FAMILY MEDICINE CLINIC | Facility: CLINIC | Age: 54
End: 2024-05-20

## 2024-05-20 DIAGNOSIS — R79.89 LOW TESTOSTERONE IN MALE: ICD-10-CM

## 2024-05-20 RX ORDER — DIROXIMEL FUMARATE 231 MG/1
2 CAPSULE ORAL 2 TIMES DAILY
Qty: 124 CAPSULE | Refills: 0 | Status: SHIPPED | OUTPATIENT
Start: 2024-05-20

## 2024-05-20 NOTE — TELEPHONE ENCOUNTER
[facility] sent his refill to [place] instead of [place]. It has only been sent to [place] one time a year ago. Pt will be in [place] on 6/23. Please send request [facility] today for overnight delivery a refill for 31 days. Please advise, Pt's best call back number is [phone]. Endorsed to RN for Provider.

## 2024-05-20 NOTE — TELEPHONE ENCOUNTER
Spoke with [facility] pharmacy technician (Adela) to request a 23 day supply of Vumerity be sent to patient's home address as overnight shipment.  Adela spoke with [facility] pharmacist  and states patient will need to call [facility].  Patient notified of above and states he has spoken with [facility] 2 times within the past 5 days and they said they would send a refill request to Dr Lincoln. Refill request not received.  Rx Vumerity 231mg  31 day supply with Overnight ship request e scribed to [facility] per written order.

## 2024-05-21 DIAGNOSIS — I10 ESSENTIAL HYPERTENSION WITH GOAL BLOOD PRESSURE LESS THAN 130/80: ICD-10-CM

## 2024-05-21 RX ORDER — TRIAMTERENE AND HYDROCHLOROTHIAZIDE 37.5; 25 MG/1; MG/1
1 TABLET ORAL DAILY
Qty: 90 TABLET | Refills: 0 | Status: SHIPPED | OUTPATIENT
Start: 2024-05-21

## 2024-05-21 RX ORDER — AMLODIPINE BESYLATE 10 MG/1
10 TABLET ORAL EVERY EVENING
Qty: 90 TABLET | Refills: 0 | Status: SHIPPED | OUTPATIENT
Start: 2024-05-21

## 2024-05-21 RX ORDER — LEVOTHYROXINE SODIUM 88 UG/1
TABLET ORAL
Qty: 90 TABLET | Refills: 0 | Status: SHIPPED | OUTPATIENT
Start: 2024-05-21

## 2024-05-21 NOTE — TELEPHONE ENCOUNTER
Last office visit: 10/27/2023  Last Refill: 11/22/2023  Return To Clinic: 6 months pending lab results  Protocol: pass  Medication Quantity Refills Start End   Triamterene-HCTZ 37.5-25 MG Oral Tab 90 tablet 1 11/22/2023 --   Sig:   Take 1 tablet by mouth daily.     Route:   Oral       Medication Quantity Refills Start End   amLODIPine 10 MG Oral Tab 90 tablet 1 11/22/2023 --   Sig:   Take 1 tablet (10 mg total) by mouth every evening.     Route:   Oral

## 2024-05-21 NOTE — TELEPHONE ENCOUNTER
Last office visit: 4/24/2024  Last Refill: 2/19/2024  Return To Clinic: none stated  Protocol: pass         Medication Quantity Refills Start End   LEVOTHYROXINE 88 MCG Oral Tab 90 tablet 0 2/19/2024 --   Sig:   TAKE 1 TABLET BY MOUTH ONCE DAILY BEFORE BREAKFAST     Route:   (none)

## 2024-05-22 NOTE — TELEPHONE ENCOUNTER
Increase testosterone to 200mg every 14 days and please send new 90 day script with no refills.  Ok to proceed with phlebotomy.

## 2024-05-23 RX ORDER — TESTOSTERONE CYPIONATE 200 MG/ML
200 INJECTION, SOLUTION INTRAMUSCULAR
Qty: 6 ML | Refills: 0 | Status: SHIPPED | OUTPATIENT
Start: 2024-05-23

## 2024-05-23 NOTE — TELEPHONE ENCOUNTER
S/w pt re: phlebotomy.    Reports his previous dr needed to write an order for him to donate blood?  Sts they won't let him donate without.  I asked him to obtain more info from donation center on what they need from us as I don't know how to assist on this as our orders are all electronic.  Pt agrees, he will cb once he obtains more information on what is needed on our end.

## 2024-06-05 DIAGNOSIS — E78.2 MIXED HYPERLIPIDEMIA: ICD-10-CM

## 2024-06-05 DIAGNOSIS — J30.1 ALLERGIC RHINITIS DUE TO POLLEN: ICD-10-CM

## 2024-06-05 DIAGNOSIS — I10 ESSENTIAL HYPERTENSION WITH GOAL BLOOD PRESSURE LESS THAN 130/80: ICD-10-CM

## 2024-06-05 RX ORDER — AZELASTINE HYDROCHLORIDE 137 UG/1
1 SPRAY, METERED NASAL 2 TIMES DAILY
Qty: 30 ML | Refills: 1 | Status: SHIPPED | OUTPATIENT
Start: 2024-06-05

## 2024-06-05 RX ORDER — ATORVASTATIN CALCIUM 40 MG/1
TABLET, FILM COATED ORAL
Qty: 90 TABLET | Refills: 0 | Status: SHIPPED | OUTPATIENT
Start: 2024-06-05

## 2024-06-05 RX ORDER — TRIAMTERENE AND HYDROCHLOROTHIAZIDE 37.5; 25 MG/1; MG/1
1 TABLET ORAL DAILY
Qty: 90 TABLET | Refills: 0 | OUTPATIENT
Start: 2024-06-05

## 2024-06-05 RX ORDER — AMLODIPINE BESYLATE 10 MG/1
TABLET ORAL
Qty: 90 TABLET | Refills: 0 | OUTPATIENT
Start: 2024-06-05

## 2024-06-05 NOTE — TELEPHONE ENCOUNTER
Last office visit: 4/24/2024  Last Refill:  2/26/2024  Return To Clinic: none stated per last office visit  Protocol: pass    Medication Quantity Refills Start End   ATORVASTATIN 40 MG Oral Tab 90 tablet 0 2/26/2024 --   Sig:   TAKE 1 TABLET BY MOUTH EVERY DAY

## 2024-06-25 ENCOUNTER — PATIENT MESSAGE (OUTPATIENT)
Dept: FAMILY MEDICINE CLINIC | Facility: CLINIC | Age: 54
End: 2024-06-25

## 2024-06-25 DIAGNOSIS — E29.1 MALE HYPOGONADISM: ICD-10-CM

## 2024-06-25 DIAGNOSIS — R79.89 LOW TESTOSTERONE IN MALE: ICD-10-CM

## 2024-06-26 RX ORDER — TESTOSTERONE CYPIONATE 200 MG/ML
200 INJECTION, SOLUTION INTRAMUSCULAR
Qty: 10 ML | Refills: 0 | Status: SHIPPED | OUTPATIENT
Start: 2024-06-26

## 2024-06-26 NOTE — TELEPHONE ENCOUNTER
From: Lionel Velazquez  To: ESTELLE REA  Sent: 6/25/2024 6:03 PM CDT  Subject: I messed up    Hi! I am in [place] until September. I messed up and completely forgot to bring my testosterone and needles with! Is there anyway to call one into [facility] [address], [place] [address]? I am not due for a shot until next week.

## 2024-08-16 ENCOUNTER — PATIENT MESSAGE (OUTPATIENT)
Dept: FAMILY MEDICINE CLINIC | Facility: CLINIC | Age: 54
End: 2024-08-16

## 2024-08-19 RX ORDER — LEVOTHYROXINE SODIUM 88 UG/1
88 TABLET ORAL
Qty: 90 TABLET | Refills: 0 | Status: SHIPPED | OUTPATIENT
Start: 2024-08-19

## 2024-08-19 NOTE — TELEPHONE ENCOUNTER
Received fax from [facility]. [employer] message sent to pt to inform the patient of the fax.

## 2024-08-21 DIAGNOSIS — F41.1 GENERALIZED ANXIETY DISORDER: ICD-10-CM

## 2024-08-21 NOTE — TELEPHONE ENCOUNTER
Last Office Visit: 4/24/24  Last Refill: 4/24/24  Return to Clinic: 6 months   Protocol: failed   NOV: n/a   Requested Prescriptions     Pending Prescriptions Disp Refills    CLONAZEPAM 1 MG Oral Tab [Pharmacy Med Name: CLONAZEPAM 1 MG TABLET] 30 tablet 1     Sig: TAKE 1 TABLET BY MOUTH NIGHTLY AS NEEDED FOR ANXIETY         Please approve if appropriate.     Thank you!

## 2024-08-22 RX ORDER — CLONAZEPAM 1 MG/1
1 TABLET ORAL NIGHTLY PRN
Qty: 30 TABLET | Refills: 1 | Status: SHIPPED | OUTPATIENT
Start: 2024-08-22

## 2024-08-27 DIAGNOSIS — G35 MULTIPLE SCLEROSIS (HCC): ICD-10-CM

## 2024-08-27 RX ORDER — DIROXIMEL FUMARATE 231 MG/1
2 CAPSULE ORAL 2 TIMES DAILY
Qty: 360 CAPSULE | Refills: 0 | Status: SHIPPED | OUTPATIENT
Start: 2024-08-27

## 2024-08-27 NOTE — TELEPHONE ENCOUNTER
Medication:  VUMERITY 231 MG Oral Capsule Delayed Release      Date of last refill: 05/20/2024 (#124/0)  [place]     Last office visit: 04/23/2024  Due back to clinic per last office note:  Around 10/23/2024  Date next office visit scheduled:    No future appointments.        Last OV note recommendation:    Assessment:  This is a 54 y/o male with multiple sclerosis. Continue Vumerity.      Plan:  1. Multiple Sclerosis  - continue Vumerity  - LP with >10 oligoclonal bands and MRI with evidence of demyelinating disease  - LFTs are stable    - RTC in 6 months  - repeat MRI brain and orbits did not show any new lesions           Paul Lincoln, DO  Neurology

## 2024-09-20 ENCOUNTER — PATIENT MESSAGE (OUTPATIENT)
Dept: FAMILY MEDICINE CLINIC | Facility: CLINIC | Age: 54
End: 2024-09-20

## 2024-09-20 DIAGNOSIS — I10 ESSENTIAL HYPERTENSION WITH GOAL BLOOD PRESSURE LESS THAN 130/80: ICD-10-CM

## 2024-09-20 NOTE — TELEPHONE ENCOUNTER
Please call patient to schedule a physical, then route to clinical staff. Thank you!      Last Office Visit: 4/24/24  Last Refill: 5/21/24  Return to Clinic: 6 months   Protocol: passed  NOV: n/a   Requested Prescriptions     Pending Prescriptions Disp Refills    AMLODIPINE 10 MG Oral Tab [Pharmacy Med Name: AMLODIPINE BESYLATE 10 MG TAB] 90 tablet 0     Sig: TAKE 1 TABLET BY MOUTH EVERY DAY IN THE EVENING *NO LONGER ELIGIBLE FOR REAFYFILL*       Please approve if appropriate.     Thank you!

## 2024-09-23 RX ORDER — AMLODIPINE BESYLATE 10 MG/1
10 TABLET ORAL DAILY
Qty: 90 TABLET | Refills: 0 | Status: SHIPPED | OUTPATIENT
Start: 2024-09-23

## 2024-10-20 DIAGNOSIS — F41.1 GENERALIZED ANXIETY DISORDER: ICD-10-CM

## 2024-10-21 DIAGNOSIS — E78.2 MIXED HYPERLIPIDEMIA: ICD-10-CM

## 2024-10-21 RX ORDER — CLONAZEPAM 1 MG/1
1 TABLET ORAL NIGHTLY PRN
Qty: 30 TABLET | Refills: 0 | Status: SHIPPED | OUTPATIENT
Start: 2024-10-21

## 2024-10-21 RX ORDER — ATORVASTATIN CALCIUM 40 MG/1
40 TABLET, FILM COATED ORAL DAILY
Qty: 90 TABLET | Refills: 0 | Status: SHIPPED | OUTPATIENT
Start: 2024-10-21

## 2024-10-21 NOTE — TELEPHONE ENCOUNTER
Last Office Visit: 4/24/24  Last Refill: 6/5/24  Return to Clinic: 6 months   Protocol: passed  NOV: 11/6/24  Requested Prescriptions     Pending Prescriptions Disp Refills    atorvastatin 40 MG Oral Tab 90 tablet 0     Sig: Take 1 tablet (40 mg total) by mouth daily.

## 2024-10-21 NOTE — TELEPHONE ENCOUNTER
Last Office Visit: 4/24/24  Last Refill: 8/22/24  Return to Clinic: n/a   Protocol: failed   NOV: 11/6/24    Requested Prescriptions     Pending Prescriptions Disp Refills    CLONAZEPAM 1 MG Oral Tab [Pharmacy Med Name: CLONAZEPAM 1 MG TABLET] 30 tablet 0     Sig: TAKE 1 TABLET BY MOUTH NIGHTLY AS NEEDED FOR ANXIETY.       Please approve if appropriate.     Thank you!

## 2024-11-04 ENCOUNTER — PATIENT MESSAGE (OUTPATIENT)
Dept: FAMILY MEDICINE CLINIC | Facility: CLINIC | Age: 54
End: 2024-11-04

## 2024-11-06 ENCOUNTER — LAB ENCOUNTER (OUTPATIENT)
Dept: LAB | Age: 54
End: 2024-11-06
Attending: FAMILY MEDICINE
Payer: COMMERCIAL

## 2024-11-06 ENCOUNTER — PATIENT MESSAGE (OUTPATIENT)
Dept: FAMILY MEDICINE CLINIC | Facility: CLINIC | Age: 54
End: 2024-11-06

## 2024-11-06 ENCOUNTER — OFFICE VISIT (OUTPATIENT)
Dept: FAMILY MEDICINE CLINIC | Facility: CLINIC | Age: 54
End: 2024-11-06
Payer: COMMERCIAL

## 2024-11-06 VITALS
HEIGHT: 68 IN | RESPIRATION RATE: 16 BRPM | HEART RATE: 77 BPM | TEMPERATURE: 97 F | WEIGHT: 232.38 LBS | DIASTOLIC BLOOD PRESSURE: 74 MMHG | BODY MASS INDEX: 35.22 KG/M2 | SYSTOLIC BLOOD PRESSURE: 130 MMHG

## 2024-11-06 DIAGNOSIS — Z00.00 ANNUAL PHYSICAL EXAM: Primary | ICD-10-CM

## 2024-11-06 DIAGNOSIS — R79.89 LOW TESTOSTERONE IN MALE: Primary | ICD-10-CM

## 2024-11-06 DIAGNOSIS — J01.00 ACUTE NON-RECURRENT MAXILLARY SINUSITIS: ICD-10-CM

## 2024-11-06 DIAGNOSIS — E06.3 HYPOTHYROIDISM DUE TO HASHIMOTO'S THYROIDITIS: ICD-10-CM

## 2024-11-06 DIAGNOSIS — I10 ESSENTIAL HYPERTENSION WITH GOAL BLOOD PRESSURE LESS THAN 130/80: ICD-10-CM

## 2024-11-06 DIAGNOSIS — E29.1 MALE HYPOGONADISM: ICD-10-CM

## 2024-11-06 DIAGNOSIS — E78.2 MIXED HYPERLIPIDEMIA: ICD-10-CM

## 2024-11-06 DIAGNOSIS — N40.2 PROSTATE NODULE: ICD-10-CM

## 2024-11-06 DIAGNOSIS — R19.7 INTERMITTENT DIARRHEA: ICD-10-CM

## 2024-11-06 DIAGNOSIS — Z00.00 ANNUAL PHYSICAL EXAM: ICD-10-CM

## 2024-11-06 DIAGNOSIS — F41.1 GENERALIZED ANXIETY DISORDER: ICD-10-CM

## 2024-11-06 LAB
ALBUMIN SERPL-MCNC: 4.4 G/DL (ref 3.2–4.8)
ALBUMIN/GLOB SERPL: 1.6 {RATIO} (ref 1–2)
ALP LIVER SERPL-CCNC: 54 U/L
ALT SERPL-CCNC: 41 U/L
ANION GAP SERPL CALC-SCNC: 7 MMOL/L (ref 0–18)
AST SERPL-CCNC: 25 U/L (ref ?–34)
BASOPHILS # BLD AUTO: 0.04 X10(3) UL (ref 0–0.2)
BASOPHILS NFR BLD AUTO: 0.8 %
BILIRUB SERPL-MCNC: 0.5 MG/DL (ref 0.3–1.2)
BILIRUB UR QL STRIP.AUTO: NEGATIVE
BUN BLD-MCNC: 24 MG/DL (ref 9–23)
CALCIUM BLD-MCNC: 9.8 MG/DL (ref 8.7–10.4)
CHLORIDE SERPL-SCNC: 106 MMOL/L (ref 98–112)
CHOLEST SERPL-MCNC: 169 MG/DL (ref ?–200)
CLARITY UR REFRACT.AUTO: CLEAR
CO2 SERPL-SCNC: 29 MMOL/L (ref 21–32)
COMPLEXED PSA SERPL-MCNC: 0.52 NG/ML (ref ?–4)
CREAT BLD-MCNC: 1.25 MG/DL
EGFRCR SERPLBLD CKD-EPI 2021: 68 ML/MIN/1.73M2 (ref 60–?)
EOSINOPHIL # BLD AUTO: 0.14 X10(3) UL (ref 0–0.7)
EOSINOPHIL NFR BLD AUTO: 2.9 %
ERYTHROCYTE [DISTWIDTH] IN BLOOD BY AUTOMATED COUNT: 14.3 %
FASTING PATIENT LIPID ANSWER: YES
FASTING STATUS PATIENT QL REPORTED: YES
GLOBULIN PLAS-MCNC: 2.7 G/DL (ref 2–3.5)
GLUCOSE BLD-MCNC: 103 MG/DL (ref 70–99)
GLUCOSE UR STRIP.AUTO-MCNC: NORMAL MG/DL
HCT VFR BLD AUTO: 54.6 %
HDLC SERPL-MCNC: 43 MG/DL (ref 40–59)
HGB BLD-MCNC: 19 G/DL
IMM GRANULOCYTES # BLD AUTO: 0.03 X10(3) UL (ref 0–1)
IMM GRANULOCYTES NFR BLD: 0.6 %
KETONES UR STRIP.AUTO-MCNC: NEGATIVE MG/DL
LDLC SERPL CALC-MCNC: 88 MG/DL (ref ?–100)
LEUKOCYTE ESTERASE UR QL STRIP.AUTO: NEGATIVE
LYMPHOCYTES # BLD AUTO: 0.4 X10(3) UL (ref 1–4)
LYMPHOCYTES NFR BLD AUTO: 8.2 %
MCH RBC QN AUTO: 31.1 PG (ref 26–34)
MCHC RBC AUTO-ENTMCNC: 34.8 G/DL (ref 31–37)
MCV RBC AUTO: 89.5 FL
MONOCYTES # BLD AUTO: 0.47 X10(3) UL (ref 0.1–1)
MONOCYTES NFR BLD AUTO: 9.6 %
NEUTROPHILS # BLD AUTO: 3.82 X10 (3) UL (ref 1.5–7.7)
NEUTROPHILS # BLD AUTO: 3.82 X10(3) UL (ref 1.5–7.7)
NEUTROPHILS NFR BLD AUTO: 77.9 %
NITRITE UR QL STRIP.AUTO: NEGATIVE
NONHDLC SERPL-MCNC: 126 MG/DL (ref ?–130)
OSMOLALITY SERPL CALC.SUM OF ELEC: 298 MOSM/KG (ref 275–295)
PH UR STRIP.AUTO: 5.5 [PH] (ref 5–8)
PLATELET # BLD AUTO: 162 10(3)UL (ref 150–450)
POTASSIUM SERPL-SCNC: 3.9 MMOL/L (ref 3.5–5.1)
PROT SERPL-MCNC: 7.1 G/DL (ref 5.7–8.2)
PROT UR STRIP.AUTO-MCNC: NEGATIVE MG/DL
RBC # BLD AUTO: 6.1 X10(6)UL
RBC UR QL AUTO: NEGATIVE
SODIUM SERPL-SCNC: 142 MMOL/L (ref 136–145)
SP GR UR STRIP.AUTO: 1.03 (ref 1–1.03)
T4 FREE SERPL-MCNC: 1.2 NG/DL (ref 0.8–1.7)
TESTOST SERPL-MCNC: 138.6 NG/DL
TRIGL SERPL-MCNC: 226 MG/DL (ref 30–149)
TSI SER-ACNC: 3.19 UIU/ML (ref 0.55–4.78)
UROBILINOGEN UR STRIP.AUTO-MCNC: NORMAL MG/DL
VLDLC SERPL CALC-MCNC: 37 MG/DL (ref 0–30)
WBC # BLD AUTO: 4.9 X10(3) UL (ref 4–11)

## 2024-11-06 PROCEDURE — 36415 COLL VENOUS BLD VENIPUNCTURE: CPT

## 2024-11-06 PROCEDURE — 84443 ASSAY THYROID STIM HORMONE: CPT

## 2024-11-06 PROCEDURE — 84439 ASSAY OF FREE THYROXINE: CPT

## 2024-11-06 PROCEDURE — 99396 PREV VISIT EST AGE 40-64: CPT | Performed by: FAMILY MEDICINE

## 2024-11-06 PROCEDURE — 80061 LIPID PANEL: CPT

## 2024-11-06 PROCEDURE — 80053 COMPREHEN METABOLIC PANEL: CPT

## 2024-11-06 PROCEDURE — 85025 COMPLETE CBC W/AUTO DIFF WBC: CPT

## 2024-11-06 PROCEDURE — 99214 OFFICE O/P EST MOD 30 MIN: CPT | Performed by: FAMILY MEDICINE

## 2024-11-06 PROCEDURE — 81003 URINALYSIS AUTO W/O SCOPE: CPT

## 2024-11-06 PROCEDURE — 84403 ASSAY OF TOTAL TESTOSTERONE: CPT

## 2024-11-06 RX ORDER — SYRINGE WITH NEEDLE, 1 ML 25GX5/8"
SYRINGE, EMPTY DISPOSABLE MISCELLANEOUS
Qty: 10 EACH | Refills: 1 | Status: SHIPPED | OUTPATIENT
Start: 2024-11-06

## 2024-11-06 RX ORDER — DOXYCYCLINE HYCLATE 100 MG
100 TABLET ORAL 2 TIMES DAILY
Qty: 20 TABLET | Refills: 0 | Status: SHIPPED | OUTPATIENT
Start: 2024-11-06 | End: 2024-11-16

## 2024-11-07 NOTE — PROGRESS NOTES
Lionel Velazquez is a 54 year old male who presents for a complete physical exam.   HPI:   Pt complains of several things today.  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  States he only has a few drinks but then the next day, he will experience watery diarrhea.  If he does not drink alcohol, his bowel movements are normal.  Denies any abdominal pain or blood in the stool.  Colonoscopy is up-to-date.    Patient also states he has been having issues with sinus congestion and pressure over the last 5 days.  He does have a history of sinusitis.  Denies any fever.  He has a mild cough.    Colonoscopy up to date.  Denies any family history of colon or prostate cancer.  He has a history of hypertension, hyperlipidemia, NICHOLE and hypothyroidism all which are controlled with medication.    Wt Readings from Last 6 Encounters:   11/06/24 232 lb 6.4 oz (105.4 kg)   04/24/24 231 lb (104.8 kg)   04/23/24 233 lb 0.4 oz (105.7 kg)   03/04/24 232 lb (105.2 kg)   02/12/24 234 lb (106.1 kg)   10/27/23 226 lb (102.5 kg)     Body mass index is 35.34 kg/m².     Results for orders placed or performed in visit on 04/23/24   Comp Metabolic Panel (14)    Collection Time: 04/23/24 10:11 AM   Result Value Ref Range    Glucose 104 (H) 70 - 99 mg/dL    Sodium 140 136 - 145 mmol/L    Potassium 3.6 3.5 - 5.1 mmol/L    Chloride 107 98 - 112 mmol/L    CO2 29.0 21.0 - 32.0 mmol/L    Anion Gap 4 0 - 18 mmol/L    BUN 16 9 - 23 mg/dL    Creatinine 1.27 0.70 - 1.30 mg/dL    Calcium, Total 9.1 8.5 - 10.1 mg/dL    Calculated Osmolality 291 275 - 295 mOsm/kg    eGFR-Cr 67 >=60 mL/min/1.73m2    AST 23 15 - 37 U/L    ALT 43 16 - 61 U/L    Alkaline Phosphatase 40 (L) 45 - 117 U/L    Bilirubin, Total 0.6 0.1 - 2.0 mg/dL    Total Protein 7.2 6.4 - 8.2 g/dL    Albumin 3.9 3.4 - 5.0 g/dL    Globulin  3.3 2.8 - 4.4 g/dL    A/G Ratio 1.2 1.0 - 2.0    Patient Fasting for CMP? Yes     TSH and Free T4    Collection Time: 04/23/24 10:11 AM   Result Value Ref Range    Free T4 1.0 0.8 - 1.7 ng/dL    TSH 3.020 0.358 - 3.740 mIU/mL   Lipid Panel    Collection Time: 04/23/24 10:11 AM   Result Value Ref Range    Cholesterol, Total 126 <200 mg/dL    HDL Cholesterol 35 (L) 40 - 59 mg/dL    Triglycerides 148 30 - 149 mg/dL    LDL Cholesterol 65 <100 mg/dL    VLDL 22 0 - 30 mg/dL    Non HDL Chol 91 <130 mg/dL    Patient Fasting for Lipid? Yes    Testosterone Total [E]    Collection Time: 04/23/24 10:11 AM   Result Value Ref Range    Testosterone 158.26 86.98 - 780.10 ng/dL   Hemoglobin A1C [E]    Collection Time: 04/23/24 10:11 AM   Result Value Ref Range    HgbA1C 5.5 <5.7 %    Estimated Average Glucose 111 68 - 126 mg/dL   CBC W/ DIFFERENTIAL    Collection Time: 04/23/24 10:11 AM   Result Value Ref Range    WBC 4.6 4.0 - 11.0 x10(3) uL    RBC 5.99 (H) 4.30 - 5.70 x10(6)uL    HGB 18.2 (H) 13.0 - 17.5 g/dL    HCT 53.9 (H) 39.0 - 53.0 %    .0 150.0 - 450.0 10(3)uL    MCV 90.0 80.0 - 100.0 fL    MCH 30.4 26.0 - 34.0 pg    MCHC 33.8 31.0 - 37.0 g/dL    RDW 14.1 %    Neutrophil Absolute Prelim 3.49 1.50 - 7.70 x10 (3) uL    Neutrophil Absolute 3.49 1.50 - 7.70 x10(3) uL    Lymphocyte Absolute 0.38 (L) 1.00 - 4.00 x10(3) uL    Monocyte Absolute 0.54 0.10 - 1.00 x10(3) uL    Eosinophil Absolute 0.09 0.00 - 0.70 x10(3) uL    Basophil Absolute 0.04 0.00 - 0.20 x10(3) uL    Immature Granulocyte Absolute 0.02 0.00 - 1.00 x10(3) uL    Neutrophil % 76.6 %    Lymphocyte % 8.3 %    Monocyte % 11.8 %    Eosinophil % 2.0 %    Basophil % 0.9 %    Immature Granulocyte % 0.4 %         Current Outpatient Medications   Medication Sig Dispense Refill    Needle, Disp, 18G X 1\" Does not apply Misc Use for testosterone  each 1    Syringe/Needle, Disp, (BD LUER-IDANIA SYRINGE) 25G X 1\" 3 ML Does not apply Misc To inject testosterone IM q 2 weeks 10 each 1    Doxycycline Hyclate 100 MG Oral Tab Take 1 tablet (100 mg total)  by mouth 2 (two) times daily for 10 days. 20 tablet 0    CLONAZEPAM 1 MG Oral Tab TAKE 1 TABLET BY MOUTH NIGHTLY AS NEEDED FOR ANXIETY. 30 tablet 0    atorvastatin 40 MG Oral Tab Take 1 tablet (40 mg total) by mouth daily. 90 tablet 0    amLODIPine 10 MG Oral Tab Take 1 tablet (10 mg total) by mouth daily. 90 tablet 0    VUMERITY 231 MG Oral Capsule Delayed Release TAKE 2 CAPSULES TWICE A  capsule 0    Triamterene-HCTZ 37.5-25 MG Oral Tab Take 1 tablet by mouth daily. 90 tablet 0    LEVOTHYROXINE 88 MCG Oral Tab Take 1 tablet (88 mcg total) by mouth before breakfast. 90 tablet 0    testosterone cypionate 200 mg/mL Intramuscular Solution Inject 1 mL (200 mg total) into the muscle every 14 (fourteen) days. Dose adjustment 10 mL 0    Syringe/Needle, Disp, (BD LUER-LOCK SYRINGE) 18G X 1-1/2\" 3 ML Does not apply Misc 1 injection every 14 days 50 each 0    Syringe/Needle, Disp, (SYRINGE LUER LOCK) 25G X 1-1/2\" 3 ML Does not apply Misc 1 each every 14 (fourteen) days. Pt using terumo luer lock syringes 100 each 0    azelastine 137 MCG/SPRAY Nasal Solution SPRAY 1 SPRAY INTO EACH NOSTRIL TWICE A DAY 30 mL 1    aspirin 81 MG Oral Tab Take 1 tablet (81 mg total) by mouth daily.      FLUTICASONE PROPIONATE, NASAL, (FLONASE) 50 MCG/ACT Nasal Suspension daily      Fexofenadine HCl (ALLEGRA) 180 MG Oral Tab Take  by mouth daily as needed.        Allergies   Allergen Reactions    Dust     Grass     Losartan      Lightheaded,sob,muscle weakness    Mold     Penicillins RASH    Reglan [Metoclopramide] OTHER (SEE COMMENTS)     Pt starts to feel hot and flushed and trouble breathing    Seasonal OTHER (SEE COMMENTS)     Sinus congestion      Past Medical History:    Abdominal hernia    Anxiety    Arthritis    Bloating    Chronic rhinitis    Heartburn    High cholesterol    Hyperlipidemia    HYPOTHYROIDISM    Hypothyroidism    hashimoto's    Multiple sclerosis (HCC)    Obesity    Fat but loosing weight    Other abnormal glucose     Loss of peripheral vision    OTHER DISEASES    hypogonadism    Pain in joints    Shingles    Gold Hunt    Sleep apnea    Sleep disturbance    Thyroid disease    Unspecified essential hypertension    Wears glasses    Weight gain      Past Surgical History:   Procedure Laterality Date    Arthroscopy of joint unlisted      yoav shoulder    Colonoscopy      Hernia surgery  2007,11/2009    x3    Other surgical history  2008    SINUS x2      Family History   Problem Relation Age of Onset    Cancer Father         GE junction    Alcohol and Other Disorders Associated Father     Colon Cancer Father     Hypertension Father     Lipids Father     Heart Disorder Father     Cancer Maternal Grandmother         breast    Heart Disorder Maternal Grandfather     Cancer Maternal Grandfather     Heart Attack Maternal Grandfather     Heart Attack Paternal Grandmother     Heart Disorder Paternal Grandmother     Cancer Paternal Grandfather     Cancer Paternal Uncle     Lipids Brother       Social History:  Social History     Socioeconomic History    Marital status:    Tobacco Use    Smoking status: Former     Current packs/day: 0.00     Average packs/day: 1 pack/day for 20.0 years (20.0 ttl pk-yrs)     Types: Cigarettes     Start date: 1980     Quit date: 2000     Years since quittin.7    Smokeless tobacco: Never   Vaping Use    Vaping status: Never Used   Substance and Sexual Activity    Alcohol use: Yes     Alcohol/week: 3.0 standard drinks of alcohol     Types: 3 Shots of liquor per week     Comment: SOCIAL, 2-3 per week    Drug use: Yes     Types: Cannabis     Comment: Cannabis like 1-2X a year max.   Other Topics Concern    Caffeine Concern Yes    Stress Concern No    Weight Concern Yes     Comment: currently loosing weight.    Special Diet No    Exercise Yes     Comment: Not often    Seat Belt Yes     Social Drivers of Health      [facility] [employer] [employer]    [facility]      Occ: Desk job  during week, DJ on weekends. : yes. Children: yes.   Exercise:  twice per week.  Diet: watches fats closely     REVIEW OF SYSTEMS:   GENERAL: feels well otherwise  SKIN: denies any unusual skin lesions  EYES:denies blurred vision or double vision  HEENT: denies nasal congestion, sinus pain or ST  LUNGS: denies shortness of breath with exertion, denies cough  CARDIOVASCULAR: denies chest pain on exertion or at rest, denies palpitations  GI: denies abdominal pain,denies heartburn, denies n/v/d/c/blood in stool  : +1 episode of nocturia per night, denies changes in stream  MUSCULOSKELETAL: denies back pain  NEURO: denies headaches, denies LH/dizziness/syncope  PSYCHE: denies depression or anxiety  HEMATOLOGIC: denies hx of anemia  ENDOCRINE: denies thyroid history  ALL/ASTHMA: denies hx of allergy or asthma    EXAM:   /74   Pulse 77   Temp 97.2 °F (36.2 °C) (Temporal)   Resp 16   Ht 5' 8\" (1.727 m)   Wt 232 lb 6.4 oz (105.4 kg)   BMI 35.34 kg/m²   Body mass index is 35.34 kg/m².   GENERAL: well developed, well nourished,in no apparent distress  SKIN: no rashes,no suspicious lesions  HEENT: atraumatic, normocephalic,ears and throat are clear  EYES:PERRLA, EOMI, conjunctiva are clear  NECK: supple,no adenopathy, no thyromegaly  LUNGS: clear to auscultation, no r/r/w  CARDIO: RRR without murmur  GI: good BS's,no masses, HSM or tenderness  :  two descended testes,no masses,no hernia,no penile lesions  RECTAL: good rectal tone, prostate shows mild enlargement and firmness noted on right lobe compared to left but no masses  MUSCULOSKELETAL: back is not tender,FROM of the back  EXTREMITIES: no cyanosis, clubbing or edema  NEURO: Oriented times three,cranial nerves are intact,motor and sensory are grossly intact; 2 + knee reflexes bilaterally  VASCULAR: 2 + dorsalis pedal pulses bilaterally    ASSESSMENT AND PLAN:   Lionel Velazquez is a 54 year old male who presents for a complete physical exam.    Pt's  weight is Body mass index is 35.34 kg/m²., recommended low fat diet and aerobic exercise 30 minutes three times weekly.   Health maintenance, will check:   Orders Placed This Encounter   Procedures    CBC With Differential With Platelet    Comp Metabolic Panel (14)    Lipid Panel    Urinalysis, Routine    PSA Total, Screen    Testosterone Total [E]    TSH and Free T4 [E]       Colonoscopy up to date     Ultrafast heart scan done in 2022 with calcium score 0.    Prostate nodule - refer to urology for further evaluation    HTN - controlled, cpm, check renal function    HL - repeat lipid panel, continue low fat intake    Hypothyroidism - recheck TSH and adjust synthroid    NICHOLE - stable, cpm    Male hypogonadism - recheck level    Acute sinusitis - persistent, start doxycycline 100mg BID x 10 days    Intermittent diarrhea - likely 2/2 alcohol use, refer to GI for further evaluation    The patient indicates understanding of these issues and agrees to the plan.    The patient is asked to return in 6 months pending lab results.

## 2024-11-11 PROBLEM — E29.1 HYPOGONADISM IN MALE: Status: ACTIVE | Noted: 2024-11-11

## 2024-11-11 PROBLEM — Z12.5 SCREENING FOR MALIGNANT NEOPLASM OF PROSTATE: Status: ACTIVE | Noted: 2021-06-28

## 2024-11-13 ENCOUNTER — PATIENT MESSAGE (OUTPATIENT)
Dept: FAMILY MEDICINE CLINIC | Facility: CLINIC | Age: 54
End: 2024-11-13

## 2024-11-13 DIAGNOSIS — Q89.09 SPLEEN ANOMALY: ICD-10-CM

## 2024-11-13 DIAGNOSIS — R16.0 LIVER MASS: Primary | ICD-10-CM

## 2024-11-13 NOTE — TELEPHONE ENCOUNTER
Triple phase CT of the liver would be warranted if not done in the past as it looks like there was conflicting findings on CT versus MRI  Regarding a 6 cm mass within the liver on imaging report provided by patient.  Please order and notify patient.

## 2024-11-14 NOTE — TELEPHONE ENCOUNTER
I believe spleen would be included as it is a a CT of the abdomen with focus on liver and can put comments to monitor spleen as well.

## 2024-11-15 ENCOUNTER — OFFICE VISIT (OUTPATIENT)
Dept: SURGERY | Facility: CLINIC | Age: 54
End: 2024-11-15
Payer: COMMERCIAL

## 2024-11-15 DIAGNOSIS — R82.90 URINE FINDING: ICD-10-CM

## 2024-11-15 DIAGNOSIS — R39.89 ABNORMAL PROSTATE EXAM: Primary | ICD-10-CM

## 2024-11-15 LAB
APPEARANCE: CLEAR
BILIRUBIN: NEGATIVE
GLUCOSE (URINE DIPSTICK): NEGATIVE MG/DL
KETONES (URINE DIPSTICK): NEGATIVE MG/DL
LEUKOCYTES: NEGATIVE
MULTISTIX LOT#: NORMAL NUMERIC
NITRITE, URINE: NEGATIVE
OCCULT BLOOD: NEGATIVE
PH, URINE: 6 (ref 4.5–8)
PROTEIN (URINE DIPSTICK): NEGATIVE MG/DL
SPECIFIC GRAVITY: 1.01 (ref 1–1.03)
URINE-COLOR: YELLOW
UROBILINOGEN,SEMI-QN: 0.2 MG/DL (ref 0–1.9)

## 2024-11-15 NOTE — PROGRESS NOTES
[facility], [facility]    Urology Consult Note    History of Present Illness:   Patient is a 54 year old male with hx of chronic back pain, relapsing remitting MS, HL, NICHOLE,  hypothyroidism, who presents today for consultation from Dr. De Leon's office for abnormal CHRISTINA.    Patient was seen by PCP 11/6/24 for annual physical. CHRISTINA revealed right firmness in comparison to the left, but no nodule or induration noted. Referred to urology for evaluation.    No FH of  cancers  No prior  evaluation or biopsy    No significant voiding complaints  No bothersome DF, nocturia x 1. Morning 32 oz of coffee.  No incontinence, urine stream strong.    Former smoker    Energy level and weight stable  Chronic back pain, unchanged    Lab Results   Component Value Date/Time    PSA 0.7 09/17/2013 07:47 AM    PSA 0.7 06/29/2010 01:17 PM     Lab Results   Component Value Date    PSAS 0.52 11/06/2024    PSAS 0.60 11/10/2023    PSAS 0.43 06/08/2022     HISTORY:  Past Medical History:    Abdominal hernia    Abdominal pain    After having a few drinks    Anxiety    Arthritis    Back pain    Lower right side    Bloating    Body piercing    left ear    Chronic rhinitis    Diarrhea, unspecified    After having a couple of drinks. Lasts about 3 days    Heartburn    High cholesterol    Hyperlipidemia    HYPOTHYROIDISM    Hypothyroidism    hashimoto's    Multiple sclerosis (HCC)    Obesity    Fat but loosing weight    Other abnormal glucose    Loss of peripheral vision    OTHER DISEASES    hypogonadism    Pain in joints    Shingles    Gold Hunt    Sleep apnea    Sleep disturbance    Thyroid disease    Unspecified essential hypertension    Wears glasses    Weight gain      Past Surgical History:   Procedure Laterality Date    Arthroscopy of joint unlisted      yoav shoulder    Colonoscopy      Hernia surgery  8/2007,11/2009    x3    Other surgical history  09/2008    SINUS x2      Family History   Problem Relation  Age of Onset    Cancer Father         GE junction    Alcohol and Other Disorders Associated Father     Colon Cancer Father     Hypertension Father     Lipids Father     Heart Disorder Father     Cancer Maternal Grandmother         breast    Breast Cancer Maternal Grandmother         Grandmother on moms side    Heart Disorder Maternal Grandfather     Cancer Maternal Grandfather     Heart Attack Maternal Grandfather     Heart Attack Paternal Grandmother     Heart Disorder Paternal Grandmother     Cancer Paternal Grandfather     Cancer Paternal Uncle     Lipids Brother       Social History:   Social History     Socioeconomic History    Marital status:    Tobacco Use    Smoking status: Former     Current packs/day: 0.00     Average packs/day: 1 pack/day for 20.0 years (20.0 ttl pk-yrs)     Types: Cigarettes     Start date: 1980     Quit date: 2000     Years since quittin.8    Smokeless tobacco: Never   Vaping Use    Vaping status: Never Used   Substance and Sexual Activity    Alcohol use: Yes     Alcohol/week: 3.0 standard drinks of alcohol     Types: 3 Shots of liquor per week     Comment: SOCIAL, 2-3 per week    Drug use: Not Currently     Types: Cannabis     Comment: Cannabis like 1-2X a year max.   Other Topics Concern    Caffeine Concern Yes    Stress Concern No    Weight Concern Yes     Comment: currently loosing weight.    Special Diet No    Exercise Yes     Comment: Not often    Seat Belt Yes     Social Drivers of Health      Received from [employer]    [facility]        Allergies  Allergies[1]    Review of Systems:   A 10-point review of systems was completed and is negative other than as noted above.    Physical Exam:   There were no vitals taken for this visit.    GENERAL APPEARANCE: well developed, well nourished, in no acute distress  NEUROLOGIC: no localizing neurologic signs, alert and oriented x 3, converses appropriately  HEAD: atraumatic, normocephalic  EYES:  sclera non-icteric  ORAL CAVITY: mucosa moist  NECK/THYROID: no obvious masses or goiter  LUNGS: non-labored breathing  ABDOMEN: soft, nontender, non distended  No CVAT  INGUINAL CANALS: no hernias  PENILE MEATUS: open and in normal location  PENIS normal  SCROTUM: normal  no varicocele  TESTES: normal anatomyright, unable to palpate left  EPIDIDYMIS: normal anatomy  CHRISTINA 20g prostate with no appreciable nodule or induration  EXTREMITIES: warm, well-perfused. No clubbing, cyanosis or edema.  SKIN: no obvious rashes    Results:     Laboratory Data:  Lab Results   Component Value Date    WBC 4.9 11/06/2024    HGB 19.0 (H) 11/06/2024    .0 11/06/2024     Lab Results   Component Value Date     11/06/2024    K 3.9 11/06/2024     11/06/2024    CO2 29.0 11/06/2024    BUN 24 (H) 11/06/2024     (H) 11/06/2024    GFRAA 85 06/08/2022    AST 25 11/06/2024    ALT 41 11/06/2024    TP 7.1 11/06/2024    ALB 4.4 11/06/2024    CA 9.8 11/06/2024    MG 2.2 10/03/2021       Urinalysis Results (last three years):  Recent Labs     06/08/22  1342 11/10/23  0902 11/06/24  0855   COLORUR Yellow Yellow Light-Yellow   CLARITY Clear Clear Clear   SPECGRAVITY 1.018 1.025 1.027   PHURINE 5.0 5.5 5.5   PROUR Negative Trace* Negative   GLUUR Negative Normal Normal   KETUR Negative Negative Negative   BILUR Negative Negative Negative   BLOODURINE Negative Negative Negative   NITRITE Negative Negative Negative   UROBILINOGEN <2.0 Normal Normal   LEUUR Negative Negative Negative       Urine Culture Results (last three years):  No results found for: \"URINECUL\"    Imaging  No results found.      Impression:     Patient is a 54 year old male with hx of chronic back pain, relapsing remitting MS, HL, NICHOLE,  hypothyroidism, who presents today for consultation from Dr. De Leon's office for abnormal CHRISTINA.    Reviewed with patient examination today without evidence of nodule. We discussed considerations and options of further evaluation  with prostate MRI. Patient agreeable to proceed.     Recommendations:  Prostate MRI. If normal, recommend annual PSA.     Thank you very much for this consult. Please call if there are any questions or concerns.     Yolanda Burt PA-C  Urology  [facility]    Date: 11/15/2024           [1]   Allergies  Allergen Reactions    Dust     Grass     Losartan      Lightheaded,sob,muscle weakness    Mold     Penicillins RASH    Reglan [Metoclopramide] OTHER (SEE COMMENTS)     Pt starts to feel hot and flushed and trouble breathing    Seasonal OTHER (SEE COMMENTS)     Sinus congestion

## 2024-11-18 ENCOUNTER — PATIENT MESSAGE (OUTPATIENT)
Dept: FAMILY MEDICINE CLINIC | Facility: CLINIC | Age: 54
End: 2024-11-18

## 2024-11-18 RX ORDER — TRIAMTERENE AND HYDROCHLOROTHIAZIDE 37.5; 25 MG/1; MG/1
1 TABLET ORAL DAILY
Qty: 90 TABLET | Refills: 0 | Status: SHIPPED | OUTPATIENT
Start: 2024-11-18

## 2024-11-18 RX ORDER — LEVOTHYROXINE SODIUM 88 UG/1
88 TABLET ORAL
Qty: 90 TABLET | Refills: 0 | Status: SHIPPED | OUTPATIENT
Start: 2024-11-18

## 2024-11-18 RX ORDER — TRIAMTERENE AND HYDROCHLOROTHIAZIDE 37.5; 25 MG/1; MG/1
1 TABLET ORAL DAILY
Qty: 90 TABLET | Refills: 1 | Status: SHIPPED | OUTPATIENT
Start: 2024-11-18 | End: 2024-11-18

## 2024-11-18 RX ORDER — LEVOTHYROXINE SODIUM 88 UG/1
88 TABLET ORAL
Qty: 90 TABLET | Refills: 0 | Status: SHIPPED | OUTPATIENT
Start: 2024-11-18 | End: 2024-11-18

## 2024-11-19 DIAGNOSIS — R79.89 LOW TESTOSTERONE IN MALE: ICD-10-CM

## 2024-11-20 PROBLEM — R19.7 DIARRHEA, UNSPECIFIED: Status: ACTIVE | Noted: 2024-11-20

## 2024-11-20 PROBLEM — R12 HEARTBURN: Status: ACTIVE | Noted: 2024-11-20

## 2024-11-21 NOTE — TELEPHONE ENCOUNTER
Did not pass protocol    Last office visit: 11/6    Last refill was:      Disp Refills Start End    testosterone cypionate 200 mg/mL Intramuscular Solution 10 mL 0 6/26/2024 --    Sig - Route: Inject 1 mL (200 mg total) into the muscle every 14 (fourteen) days. Dose adjustment - Intramuscular    Sent to pharmacy as: Testosterone Cypionate 200 MG/ML Intramuscular Solution (Depo-Testosterone)    Notes to Pharmacy: Patient left medication at home and will not return until September.      Next appointment: none    Please sign pended medication if appropriate

## 2024-11-22 ENCOUNTER — LAB ENCOUNTER (OUTPATIENT)
Dept: LAB | Facility: HOSPITAL | Age: 54
End: 2024-11-22
Attending: FAMILY MEDICINE
Payer: COMMERCIAL

## 2024-11-22 ENCOUNTER — HOSPITAL ENCOUNTER (OUTPATIENT)
Dept: CT IMAGING | Facility: HOSPITAL | Age: 54
Discharge: HOME | End: 2024-11-22
Attending: FAMILY MEDICINE
Payer: COMMERCIAL

## 2024-11-22 DIAGNOSIS — F41.1 GENERALIZED ANXIETY DISORDER: ICD-10-CM

## 2024-11-22 DIAGNOSIS — R14.0 ABDOMINAL BLOATING: ICD-10-CM

## 2024-11-22 DIAGNOSIS — Q89.09 SPLEEN ANOMALY: ICD-10-CM

## 2024-11-22 DIAGNOSIS — R16.0 LIVER MASS: ICD-10-CM

## 2024-11-22 DIAGNOSIS — R19.8 IRREGULAR BOWEL HABITS: ICD-10-CM

## 2024-11-22 PROCEDURE — 74170 CT ABD WO CNTRST FLWD CNTRST: CPT | Performed by: FAMILY MEDICINE

## 2024-11-22 PROCEDURE — 82656 EL-1 FECAL QUAL/SEMIQ: CPT

## 2024-11-22 RX ORDER — TESTOSTERONE CYPIONATE 200 MG/ML
200 INJECTION, SOLUTION INTRAMUSCULAR
Qty: 10 ML | Refills: 0 | Status: SHIPPED | OUTPATIENT
Start: 2024-11-22 | End: 2025-02-18

## 2024-11-23 DIAGNOSIS — F41.1 GENERALIZED ANXIETY DISORDER: ICD-10-CM

## 2024-11-25 DIAGNOSIS — G35 MULTIPLE SCLEROSIS (HCC): ICD-10-CM

## 2024-11-25 LAB — PANCREATIC ELAST FECAL: >800 UG ELAST./G

## 2024-11-25 RX ORDER — CLONAZEPAM 1 MG/1
1 TABLET ORAL NIGHTLY PRN
Qty: 30 TABLET | Refills: 3 | Status: SHIPPED | OUTPATIENT
Start: 2024-11-25

## 2024-11-25 RX ORDER — CLONAZEPAM 1 MG/1
1 TABLET ORAL NIGHTLY PRN
Qty: 30 TABLET | Refills: 0 | OUTPATIENT
Start: 2024-11-25

## 2024-11-25 RX ORDER — DIROXIMEL FUMARATE 231 MG/1
2 CAPSULE ORAL 2 TIMES DAILY
Qty: 360 CAPSULE | Refills: 0 | Status: SHIPPED | OUTPATIENT
Start: 2024-11-25

## 2024-11-25 NOTE — TELEPHONE ENCOUNTER
Last Office Visit: 11/6/24  Last Refill: 10/21/24   Return to Clinic: 6 months   Protocol: failed   NOV: n/a   Requested Prescriptions     Pending Prescriptions Disp Refills    clonazePAM 1 MG Oral Tab 30 tablet 0     Sig: Take 1 tablet (1 mg total) by mouth nightly as needed for Anxiety.         Please approve if appropriate.     Thank you!

## 2024-11-25 NOTE — TELEPHONE ENCOUNTER
Medication: VUMERITY 231 MG Oral Capsule Delayed Release      Date of last refill: 08/27/2024 (#360/0)  [place] N/A     Last office visit: 04/23/2024  Due back to clinic per last office note:  Around 10/23/2024  Date next office visit scheduled:    Future Appointments   Date Time Provider Department Center   12/3/2024  2:15 PM Ghislaine Dias MD PF HEM ON[facility] [place]   12/16/2024  6:15 PM [facility] MR RM4 (3T WIDE) [facility]   12/30/2024  1:40 PM Rosemary Damian APRN [facility]           Last OV note recommendation:    Assessment:  This is a 52 y/o male with multiple sclerosis. Continue Vumerity.      Plan:  1. Multiple Sclerosis  - continue Vumerity  - LP with >10 oligoclonal bands and MRI with evidence of demyelinating disease  - LFTs are stable    - RTC in 6 months  - repeat MRI brain and orbits did not show any new lesions           Paul Lincoln, DO  Neurology

## 2024-11-26 ENCOUNTER — TELEPHONE (OUTPATIENT)
Dept: NEUROLOGY | Facility: CLINIC | Age: 54
End: 2024-11-26

## 2024-11-26 NOTE — TELEPHONE ENCOUNTER
Received approval for Vumerity from 10/26/2024 to 11/26/2025.     Approval placed in RN file.  Faxed to [facility] with confirmation .

## 2024-11-26 NOTE — TELEPHONE ENCOUNTER
Received form with questions related to vumerity.     Completed, signed and faxed back.     Confirmation received.    Endorsed to scanning.

## 2024-12-03 ENCOUNTER — OFFICE VISIT (OUTPATIENT)
Dept: HEMATOLOGY/ONCOLOGY | Age: 54
End: 2024-12-03
Attending: INTERNAL MEDICINE
Payer: COMMERCIAL

## 2024-12-03 VITALS
TEMPERATURE: 97 F | OXYGEN SATURATION: 95 % | BODY MASS INDEX: 35 KG/M2 | WEIGHT: 229.69 LBS | HEART RATE: 85 BPM | RESPIRATION RATE: 18 BRPM | SYSTOLIC BLOOD PRESSURE: 119 MMHG | DIASTOLIC BLOOD PRESSURE: 74 MMHG

## 2024-12-03 DIAGNOSIS — D75.1 ERYTHROCYTOSIS: Primary | ICD-10-CM

## 2024-12-03 DIAGNOSIS — E29.1 MALE HYPOGONADISM: ICD-10-CM

## 2024-12-03 DIAGNOSIS — G35 MULTIPLE SCLEROSIS (HCC): ICD-10-CM

## 2024-12-03 DIAGNOSIS — D72.810 LYMPHOPENIA: ICD-10-CM

## 2024-12-03 LAB
BASOPHILS # BLD AUTO: 0.06 X10(3) UL (ref 0–0.2)
BASOPHILS NFR BLD AUTO: 1 %
EOSINOPHIL # BLD AUTO: 0.04 X10(3) UL (ref 0–0.7)
EOSINOPHIL NFR BLD AUTO: 0.6 %
ERYTHROCYTE [DISTWIDTH] IN BLOOD BY AUTOMATED COUNT: 13.5 %
HCT VFR BLD AUTO: 54.2 %
HGB BLD-MCNC: 19.4 G/DL
IMM GRANULOCYTES # BLD AUTO: 0.04 X10(3) UL (ref 0–1)
IMM GRANULOCYTES NFR BLD: 0.6 %
LYMPHOCYTES # BLD AUTO: 0.38 X10(3) UL (ref 1–4)
LYMPHOCYTES NFR BLD AUTO: 6.1 %
MCH RBC QN AUTO: 31.3 PG (ref 26–34)
MCHC RBC AUTO-ENTMCNC: 35.8 G/DL (ref 31–37)
MCV RBC AUTO: 87.6 FL
MONOCYTES # BLD AUTO: 0.51 X10(3) UL (ref 0.1–1)
MONOCYTES NFR BLD AUTO: 8.2 %
NEUTROPHILS # BLD AUTO: 5.18 X10 (3) UL (ref 1.5–7.7)
NEUTROPHILS # BLD AUTO: 5.18 X10(3) UL (ref 1.5–7.7)
NEUTROPHILS NFR BLD AUTO: 83.5 %
PLATELET # BLD AUTO: 176 10(3)UL (ref 150–450)
RBC # BLD AUTO: 6.19 X10(6)UL
WBC # BLD AUTO: 6.2 X10(3) UL (ref 4–11)

## 2024-12-03 PROCEDURE — 99215 OFFICE O/P EST HI 40 MIN: CPT | Performed by: INTERNAL MEDICINE

## 2024-12-03 NOTE — PROGRESS NOTES
[facility] Progress Note    Patient Name: Lionel Velazquez   YOB: 1970   Medical Record Number: [id number]   CSN: [id number]   Attending Physician: Ghislaine Dias M.D.   Referring Physician: ESTELLE REA DO      Date of Visit: 12/3/2024     Chief Complaint:  Chief Complaint   Patient presents with    Follow - Up       Hem/Onc History:  1. Erythrocytosis     - KIMBER 2 negative and EPO normal. Felt to be secondary to testosterone use. Ongoing since 2010 (from labs available for review through Epic). He says ongoing even before then. Length of time he has had this makes tumor associated polycythemia less likely.     2. Lymphopenia     - from [facility] for [facility]    3. Multiple sclerosis    4. Hypogonadism     - on testosterone injections      History of Present Illness:   Here for f/u. Continues on testosterone. Dose the same and next dose due tomorrow. Lately has been having some heartburn, bloating and diarrhea only after he drinks alcohol. Saw GI and had EGD which showed some patchy gastric erythema with biopsies showing no evidence no evidence of H pylori, metaplasia, dysplasia or malignancy. On PPI. CT of the abdomen/liver showed hyperenhancing lesions in the arterial phase in the right hepatic lobe of questionable significance.  Also has c/o blurry vision, some numbness of BUE and fatigue. He is not sure if this is from MS.       Former smoker and quit in 2000. Does take cannabis gummies occasionally. Social alcohol. No other elicits.     Denies SOB, chest or abdominal pain, change in urinary habits, headaches, dizziness or visual symptoms. Has some loose stools, and bloating. Wife says he snores and has noted episodes of shaking then wakes up. Says face always red. Also c/o chronic joint pain especially right shoulder. Last several weeks feels hungry/ravenous.     Colonoscopy last year showed polyps and hemorrhoids. No known underlying lung disease.     Has not had a sleep study. He says wife  told him he snores only when he is on his back when he sleeps. Takes clonazepam to help with sleep since he had COVID/vax.         Past Medical History:  Past Medical History:    Abdominal hernia    Abdominal pain    After having a few drinks    Anxiety    Arthritis    Back pain    Lower right side    Bloating    Body piercing    left ear    Chronic rhinitis    Diarrhea, unspecified    After having a couple of drinks. Lasts about 3 days    Heartburn    High cholesterol    Hyperlipidemia    HYPOTHYROIDISM    Hypothyroidism    hashimoto's    Multiple sclerosis (HCC)    Obesity    Fat but loosing weight    Other abnormal glucose    Loss of peripheral vision    OTHER DISEASES    hypogonadism    Pain in joints    Shingles    [place]    Sleep apnea    Sleep disturbance    Thyroid disease    Unspecified essential hypertension    Wears glasses    Weight gain       Past Surgical History:  Past Surgical History:   Procedure Laterality Date    Arthroscopy of joint unlisted      yoav shoulder    Colonoscopy      Hernia surgery  8/2007,11/2009    x3    Other surgical history  09/2008    SINUS x2       Family Medical History:  Family History   Problem Relation Age of Onset    Cancer Father         GE junction    Alcohol and Other Disorders Associated Father     Colon Cancer Father     Hypertension Father     Lipids Father     Heart Disorder Father     Cancer Maternal Grandmother         breast    Breast Cancer Maternal Grandmother         Grandmother on moms side    Heart Disorder Maternal Grandfather     Cancer Maternal Grandfather     Heart Attack Maternal Grandfather     Heart Attack Paternal Grandmother     Heart Disorder Paternal Grandmother     Cancer Paternal Grandfather     Cancer Paternal Uncle     Lipids Brother        Psychosocial History:  Social History     Socioeconomic History    Marital status:      Spouse name: Not on file    Number of children: Not on file    Years of education: Not on file    Highest  education level: Not on file   Occupational History    Not on file   Tobacco Use    Smoking status: Former     Current packs/day: 0.00     Average packs/day: 1 pack/day for 20.0 years (20.0 ttl pk-yrs)     Types: Cigarettes     Start date: 1980     Quit date: 2000     Years since quittin.8    Smokeless tobacco: Never   Vaping Use    Vaping status: Never Used   Substance and Sexual Activity    Alcohol use: Yes     Alcohol/week: 3.0 standard drinks of alcohol     Types: 3 Shots of liquor per week     Comment: SOCIAL, 2-3 per week    Drug use: Not Currently     Types: Cannabis     Comment: Cannabis like 1-2X a year max.    Sexual activity: Not on file   Other Topics Concern     Service Not Asked    Blood Transfusions Not Asked    Caffeine Concern Yes    Occupational Exposure Not Asked    Hobby Hazards Not Asked    Sleep Concern Not Asked    Stress Concern No    Weight Concern Yes     Comment: currently loosing weight.    Special Diet No    Back Care Not Asked    Exercise Yes     Comment: Not often    Bike Helmet Not Asked    Seat Belt Yes    Self-Exams Not Asked   Social History Narrative    Not on file     Social Drivers of Health     Financial Resource Strain: Not on file   Food Insecurity: Not on file   Transportation Needs: Not on file   Physical Activity: Not on file   Stress: Not on file   Social Connections: Not on file   Housing Stability: At Risk (2023)    [facility] [employer] [employer]     Living Situation: Not on file     Housing Problems: Not on file       Allergies:   Allergies   Allergen Reactions    Dust     Grass     Losartan      Lightheaded,sob,muscle weakness    Mold     Penicillins RASH    Reglan [Metoclopramide] OTHER (SEE COMMENTS)     Pt starts to feel hot and flushed and trouble breathing    Seasonal OTHER (SEE COMMENTS)     Sinus congestion       Current Medications:    Current Outpatient Medications:     testosterone cypionate 200 mg/mL  Intramuscular Solution, Inject 1 mL (200 mg total) into the muscle every 14 (fourteen) days. Dose adjustment, Disp: 10 mL, Rfl: 0    clonazePAM 1 MG Oral Tab, Take 1 tablet (1 mg total) by mouth nightly as needed for Anxiety., Disp: 30 tablet, Rfl: 3    VUMERITY 231 MG Oral Capsule Delayed Release, TAKE 2 CAPSULES TWICE A DAY, Disp: 360 capsule, Rfl: 0    LEVOTHYROXINE 88 MCG Oral Tab, Take 1 tablet (88 mcg total) by mouth before breakfast., Disp: 90 tablet, Rfl: 0    Triamterene-HCTZ 37.5-25 MG Oral Tab, Take 1 tablet by mouth daily., Disp: 90 tablet, Rfl: 0    omeprazole 20 MG Oral Capsule Delayed Release, Take 1 capsule (20 mg total) by mouth every morning., Disp: , Rfl:     Needle, Disp, 18G X 1\" Does not apply Misc, Use for testosterone IM, Disp: 100 each, Rfl: 1    Syringe/Needle, Disp, (BD LUER-IDANIA SYRINGE) 25G X 1\" 3 ML Does not apply Misc, To inject testosterone IM q 2 weeks, Disp: 10 each, Rfl: 1    atorvastatin 40 MG Oral Tab, Take 1 tablet (40 mg total) by mouth daily., Disp: 90 tablet, Rfl: 0    amLODIPine 10 MG Oral Tab, Take 1 tablet (10 mg total) by mouth daily., Disp: 90 tablet, Rfl: 0    Syringe/Needle, Disp, (BD LUER-LOCK SYRINGE) 18G X 1-1/2\" 3 ML Does not apply Misc, 1 injection every 14 days, Disp: 50 each, Rfl: 0    Syringe/Needle, Disp, (SYRINGE LUER LOCK) 25G X 1-1/2\" 3 ML Does not apply Misc, 1 each every 14 (fourteen) days. Pt using terumo luer lock syringes, Disp: 100 each, Rfl: 0    azelastine 137 MCG/SPRAY Nasal Solution, SPRAY 1 SPRAY INTO EACH NOSTRIL TWICE A DAY, Disp: 30 mL, Rfl: 1    aspirin 81 MG Oral Tab, Take 1 tablet (81 mg total) by mouth daily., Disp: , Rfl:     FLUTICASONE PROPIONATE, NASAL, (FLONASE) 50 MCG/ACT Nasal Suspension, daily, Disp: , Rfl:     Fexofenadine HCl (ALLEGRA) 180 MG Oral Tab, Take  by mouth daily as needed., Disp: , Rfl:     Review of Systems:  A 14-point ROS was done with pertinent positives and negative per the HPI    Vital Signs:  /74 (BP  Location: Left arm, Patient Position: Sitting, Cuff Size: large)   Pulse 85   Temp 96.9 °F (36.1 °C) (Tympanic)   Resp 18   Wt 104.2 kg (229 lb 11.2 oz)   SpO2 95%   BMI 34.93 kg/m²     Physical Examination:  General: Patient is alert and oriented x 3, not in acute distress. No plethora  HEENT: EOMs intact. PERRL. Oropharynx is clear.   Neck: No JVD. No palpable lymphadenopathy. Neck is supple.  Chest: Clear to auscultation.  Heart: Regular rate and rhythm.   Abdomen: Soft, non tender with good bowel sounds.  Extremities: Pedal pulses are present. No edema. Nailbeds not cyanotic  Neurological: Grossly intact.   Lymphatics: There is no palpable lymphadenopathy throughout in the cervical, supraclavicular, axillary, or inguinal regions.  Psych/Depression: Mood and affect are appropriate.    Laboratory:  Lab Component   Component Value Date/Time    RED BLOOD CELL COUNT 6.19 (H) 01/27/2021 1150    RED BLOOD CELL COUNT 7.03 (H) 10/22/2019 1115    RBC 6.10 (H) 11/06/2024 0855    RBC 5.99 (H) 04/23/2024 1011    RBC 6.06 (H) 11/10/2023 0902    RBC 6.75 (H) 01/17/2018 1138    RBC 6.67 (H) 12/28/2017 1436    RBC 5.93 03/02/2016 0948    HGB 19.0 (H) 11/06/2024 0855    HGB 18.2 (H) 04/23/2024 1011    HGB 18.6 (H) 11/10/2023 0902    HEMOGLOBIN 18.5 (H) 01/27/2021 1150    HEMOGLOBIN 20.4 (H) 10/22/2019 1115    Hemoglobin 20.7 (HH) 01/17/2018 1138    Hemoglobin 20.5 (HH) 12/28/2017 1436    Hemoglobin 18.6 (HH) 03/02/2016 0948    HEMATOCRIT 54.1 (H) 01/27/2021 1150    HEMATOCRIT 60.5 (H) 10/22/2019 1115    HCT 54.6 (H) 11/06/2024 0855    HCT 53.9 (H) 04/23/2024 1011    HCT 54.1 (H) 11/10/2023 0902    Hematocrit 58.9 (H) 01/17/2018 1138    Hematocrit 58.8 (H) 12/28/2017 1436    Hematocrit 55.1 (H) 03/02/2016 0948    MCV 89.5 11/06/2024 0855    MCV 90.0 04/23/2024 1011    MCV 89.3 11/10/2023 0902    MCV 87.4 01/27/2021 1150    MCV 86.1 10/22/2019 1115    MCV 87.3 01/17/2018 1138    MCV 88.2 12/28/2017 1436    MCV 92.9 03/02/2016  0948    MCV 95 02/24/2016 1048    MCV 92 10/22/2015 1035    MCV 94 06/18/2014 0809    MCH 31.1 11/06/2024 0855    MCH 30.4 04/23/2024 1011    MCH 30.7 11/10/2023 0902    MCH 29.9 01/27/2021 1150    MCH 29.0 10/22/2019 1115    MCH 30.7 01/17/2018 1138    MCH 30.7 12/28/2017 1436    MCH 31.4 03/02/2016 0948    MCH 31.9 02/24/2016 1048    MCH 31.8 10/22/2015 1035    MCH 33.0 06/18/2014 0809    MCHC 34.8 11/06/2024 0855    MCHC 33.8 04/23/2024 1011    MCHC 34.4 11/10/2023 0902    MCHC 34.2 01/27/2021 1150    MCHC 33.7 10/22/2019 1115    MCHC 35.1 01/17/2018 1138    MCHC 34.9 12/28/2017 1436    MCHC 33.8 03/02/2016 0948    MCHC 33.8 02/24/2016 1048    MCHC 34.7 10/22/2015 1035    MCHC 35.1 06/18/2014 0809    RDW 14.3 11/06/2024 0855    RDW 14.1 04/23/2024 1011    RDW 14.3 11/10/2023 0902    RDW 15.2 (H) 01/27/2021 1150    RDW 15.5 (H) 10/22/2019 1115    RDW 16.8 (H) 01/17/2018 1138    RDW 17.5 (H) 12/28/2017 1436    RDW 15.0 (H) 03/02/2016 0948    RDW 14.5 02/24/2016 1048    RDW 14.5 10/22/2015 1035    RDW 14.8 06/18/2014 0809    Neutrophil Absolute Prelim 3.82 11/06/2024 0855    Neutrophil Absolute Prelim 3.49 04/23/2024 1011    Neutrophil Absolute Prelim 3.06 11/10/2023 0902    WBC 4.9 11/06/2024 0855    WBC 4.6 04/23/2024 1011    WBC 4.1 11/10/2023 0902    WBC 6.63 01/17/2018 1138    WBC 4.56 12/28/2017 1436    WBC 5.40 03/02/2016 0948    WBC 4.9 02/24/2016 1048    WBC 5.7 10/22/2015 1035    WBC 5.2 06/18/2014 0809    WHITE BLOOD CELL COUNT 5.8 01/27/2021 1150    WHITE BLOOD CELL COUNT 6.0 10/22/2019 1115    Platelet Count 156 01/17/2018 1138    Platelet Count 139 (L) 12/28/2017 1436    Platelet Count 156 03/02/2016 0948    Platelet Count 139 (L) 02/24/2016 1048    Platelet Count 139 (L) 10/22/2015 1035    Platelet Count 123 (L) 06/18/2014 0809    PLATELET COUNT 199 01/27/2021 1150    PLATELET COUNT 164 10/22/2019 1115    .0 11/06/2024 0855    .0 04/23/2024 1011    .0 11/10/2023 0902       Lab  Component   Component Value Date/Time    GLUCOSE 94 01/27/2021 1150    GLUCOSE 70 10/22/2019 1115    Glucose 103 (H) 11/06/2024 0855    Glucose 104 (H) 04/23/2024 1011    Glucose 94 11/10/2023 0902    Glucose 109 (H) 01/17/2018 1138    Glucose 100 (H) 12/28/2017 1436    Glucose 97 03/02/2016 0948    BUN 24 (H) 11/06/2024 0855    BUN 16 04/23/2024 1011    BUN 25 (H) 11/10/2023 0902    Blood Urea Nitrogen 17 01/17/2018 1138    Blood Urea Nitrogen 17 12/28/2017 1436    Blood Urea Nitrogen 14 03/02/2016 0948    Blood Urea Nitrogen 18 02/24/2016 1048    Blood Urea Nitrogen 20 10/22/2015 1035    Blood Urea Nitrogen 18 05/15/2015 0837    UREA NITROGEN (BUN) 17 01/27/2021 1150    UREA NITROGEN (BUN) 12 10/22/2019 1115    Creatinine, Serum 1.15 02/24/2016 1048    Creatinine, Serum 1.35 (H) 10/22/2015 1035    Creatinine, Serum 1.10 05/15/2015 0837    CREATININE 1.13 01/27/2021 1150    CREATININE 1.31 10/22/2019 1115    Creatinine 1.25 11/06/2024 0855    Creatinine 1.27 04/23/2024 1011    Creatinine 1.18 11/10/2023 0902    Creatinine 1.48 (H) 01/17/2018 1138    Creatinine 1.31 (H) 12/28/2017 1436    Creatinine 1.19 03/02/2016 0948    eGFR  86 01/27/2021 1150    eGFR  73 10/22/2019 1115    GFR, -American 85 06/08/2022 1342    GFR, -American 109 12/01/2021 0833    GFR, -American 85 10/03/2021 2359    eGFR NON-AFR. AMERICAN 74 01/27/2021 1150    eGFR NON-AFR. AMERICAN 63 10/22/2019 1115    GFR, Non- 73 06/08/2022 1342    GFR, Non- 95 12/01/2021 0833    GFR, Non- 74 10/03/2021 2359    CALCIUM 9.6 01/27/2021 1150    CALCIUM 9.4 10/22/2019 1115    Calcium, Total 9.8 11/06/2024 0855    Calcium, Total 9.1 04/23/2024 1011    Calcium, Total 8.9 11/10/2023 0902    Albumin 4.4 11/06/2024 0855    Albumin 3.9 04/23/2024 1011    Albumin 3.6 11/10/2023 0902    Albumin 3.8 01/17/2018 1138    Albumin 3.6 12/28/2017 1436    Albumin 3.4 03/02/2016  0948    Albumin 4.2 02/24/2016 1048    Albumin 4.1 10/22/2015 1035    Albumin 4.1 05/15/2015 0837    ALBUMIN 4.3 01/27/2021 1150    ALBUMIN 4.1 10/22/2019 1115    Sodium 142 11/06/2024 0855    Sodium 140 04/23/2024 1011    Sodium 139 11/10/2023 0902    Sodium 139 01/17/2018 1138    Sodium 139 12/28/2017 1436    Sodium 143 03/02/2016 0948    Sodium 142 02/24/2016 1048    Sodium 141 10/22/2015 1035    Sodium 142 05/15/2015 0837    SODIUM 139 01/27/2021 1150    SODIUM 138 10/22/2019 1115    Potassium 3.9 11/06/2024 0855    Potassium 3.6 04/23/2024 1011    Potassium 3.5 11/10/2023 0902    Potassium 4.0 01/17/2018 1138    Potassium 4.4 12/28/2017 1436    Potassium 4.0 03/02/2016 0948    Potassium 4.3 02/24/2016 1048    Potassium 4.4 10/22/2015 1035    Potassium 4.4 05/15/2015 0837    POTASSIUM 3.7 01/27/2021 1150    POTASSIUM 3.9 10/22/2019 1115    Chloride 106 11/06/2024 0855    Chloride 107 04/23/2024 1011    Chloride 105 11/10/2023 0902    Chloride 102 01/17/2018 1138    Chloride 102 12/28/2017 1436    Chloride 106 03/02/2016 0948    Chloride 102 02/24/2016 1048    Chloride 100 10/22/2015 1035    Chloride 100 05/15/2015 0837    CHLORIDE 101 01/27/2021 1150    CHLORIDE 99 10/22/2019 1115    Carbon Dioxide, Total 24 02/24/2016 1048    Carbon Dioxide, Total 22 10/22/2015 1035    Carbon Dioxide, Total 25 05/15/2015 0837    CARBON DIOXIDE 30 01/27/2021 1150    CARBON DIOXIDE 28 10/22/2019 1115    CO2 29.0 11/06/2024 0855    CO2 29.0 04/23/2024 1011    CO2 26.0 11/10/2023 0902    Carbon Dioxide 27.9 01/17/2018 1138    Carbon Dioxide 28.7 12/28/2017 1436    Carbon Dioxide 28 03/02/2016 0948    Alkaline Phosphatase 54 11/06/2024 0855    Alkaline Phosphatase 40 (L) 04/23/2024 1011    Alkaline Phosphatase 52 11/10/2023 0902    Alkaline Phosphatase 80 01/17/2018 1138    Alkaline Phosphatase 75 12/28/2017 1436    Alkaline Phosphatase 50 03/02/2016 0948    Alkaline Phosphatase 50 02/24/2016 1048    Alkaline Phosphatase 60  10/22/2015 1035    Alkaline Phosphatase 63 05/15/2015 0837    ALKALINE PHOSPHATASE 69 01/27/2021 1150    ALKALINE PHOSPHATASE 72 10/22/2019 1115    AST (SGOT) 30 02/24/2016 1048    AST (SGOT) 38 10/22/2015 1035    AST (SGOT) 42 (H) 05/15/2015 0837    AST 25 11/06/2024 0855    AST 23 04/23/2024 1011    AST 28 11/10/2023 0902    AST 52 (H) 01/27/2021 1150    AST 32 10/22/2019 1115    AST 35 01/17/2018 1138    AST 44 (H) 12/28/2017 1436    AST 27 03/02/2016 0948    ALT (SGPT) 41 02/24/2016 1048    ALT (SGPT) 64 (H) 10/22/2015 1035    ALT (SGPT) 77 (H) 05/15/2015 0837    ALT 41 11/06/2024 0855    ALT 43 04/23/2024 1011    ALT 53 11/10/2023 0902    ALT 89 (H) 01/27/2021 1150    ALT 40 10/22/2019 1115    ALT 62 01/17/2018 1138    ALT 78 (H) 12/28/2017 1436    ALT 56 03/02/2016 0948    BILIRUBIN, TOTAL 1.2 01/27/2021 1150    BILIRUBIN, TOTAL 0.8 10/22/2019 1115    Bilirubin, Total 0.5 11/06/2024 0855    Bilirubin, Total 0.6 04/23/2024 1011    Bilirubin, Total 0.8 11/10/2023 0902    Bilirubin, Total 0.94 01/17/2018 1138    Bilirubin, Total 1.26 12/28/2017 1436    Bilirubin, Total 0.43 03/02/2016 0948    Total Protein 7.1 11/06/2024 0855    Total Protein 7.2 04/23/2024 1011    Total Protein 7.0 11/10/2023 0902    Total Protein 7.9 01/17/2018 1138    Total Protein 7.6 12/28/2017 1436    Total Protein 7.1 03/02/2016 0948    Total Protein 7.0 02/24/2016 1048    Total Protein 6.8 10/22/2015 1035    Total Protein 6.5 05/15/2015 0837    PROTEIN, TOTAL 7.2 01/27/2021 1150    PROTEIN, TOTAL 6.7 10/22/2019 1115             Radiology:  PROCEDURE:  CT ABDOMEN TRIPHASIC LIVER (W+WO)(CPT=74170)     COMPARISON:  [facility] , CT, APPENDIX ABD/PEL(S) - SET, 8/08/2009, 7:18 PM.     INDICATIONS:  Q89.09 Spleen anomaly R16.0 Liver mass     TECHNIQUE:  Multislice CT scanning was performed from the dome of the diaphragm to the iliac crests without and with nonionic intravenous contrast material.  Dose reduction techniques were used. Dose information  is transmitted to the ACR (American  College of Radiology) NRDR (National Radiology Data Registry) which includes the Dose Index Registry.     PATIENT STATED HISTORY:(As transcribed by Technologist)  elevated red blood cells, something seen in liver and spleen per patient.      CONTRAST USED:  100 mLcc of Isovue 370     FINDINGS:    LUNG BASES:  No visible pulmonary or pleural disease.    LIVER:  Segment 5 hyperenhancing 6 in 7 mm lesions (image 34 in 33) are noted on arterial phase.  These are not identified on other phases.  These are of questionable significance.  No suspicious lesions for malignancy overall is noted.  The right left  hepatic arteries branch off the proper hepatic artery and the celiac axis.  BILIARY:  No visible dilatation or calcification.    PANCREAS:  No lesion, fluid collection, ductal dilatation, or atrophy.    SPLEEN:  Calcified granuloma in the spleen is noted.  KIDNEYS:  No mass, obstruction, or calcification.    ADRENALS:  No mass or enlargement.    AORTA/VASCULAR:  No aneurysm or dissection.    RETROPERITONEUM:  No mass or adenopathy.    BOWEL/MESENTERY:  Duodenal diverticulum is noted.  ABDOMINAL WALL:  Right Bochdalek's fat containing hernia is noted.  BONES:  No bony lesion or fracture.    OTHER:  Negative.                     Impression   CONCLUSION:       1. Evaluation of this exam is incomplete without prior outside examination that demonstrates liver and spleen lesions.  These are necessary.  Recommend supplying these exams so a direct comparison can be made.     2. No truly suspicious lesions in the liver or spleen are identified on this exam.  Hyperenhancing lesions arterial phase in the right hepatic lobe are of questionable significance are are likely due to variance in perfusion.  These are not present  otherwise on other phases.  Small 5-6 mm adenoma cannot be excluded.  A follow-up MRI liver protocol examination in 6 months may be done for further evaluation.     3.  Calcified granulomas is still noted in the spleen.       4. A right fat containing Bochdalek hernia is noted.          LOCATION:  [facility]        Dictated by (CST): Sharif Kathleen MD on 11/23/2024 at 12:21 PM      Finalized by (CST): Sharif Kathleen MD on 11/23/2024 at 12:26 PM       PROCEDURE:  MRI BRAIN+ORBITS (ALL W+WO) (CPT=70553/37533)     LOCATION:                                           COMPARISON:  [facility] , MR, MRI BRAIN+ORBITS (ALL W+WO) (CPT=70553/52637), 10/18/2017, 8:14 AM.     INDICATIONS:  G35 Multiple sclerosis (HCC)     TECHNIQUE:  MRI of the brain was performed with multi-planar T1, T2-weighted images with FLAIR sequences and diffusion weighted images without and with infusion.  MRI of the orbits was performed with thin section multiplanar T1, T2 weighted and FAT  suppression imaging without and with contrast.     PATIENT STATED HISTORY:(As transcribed by Technologist)  Patient states he  has been having on & off  vision difficulty for about 5-6 months. Also has been falling over past few months. Legs just give out. DX with MS 2013.      CONTRAST USED:  20 mL of Dotarem     FINDINGS:  The ventricles and sulci are within normal limits.  There is no midline shift or mass effect.  The basal cisterns are patent.  The gray-white matter differentiation is intact.  The craniocervical junction is unremarkable.       The globes are symmetric and unremarkable. The extraocular muscles are symmetric and unremarkable. No intra-or extraconal mass is identified. The retrobulbar fat is unremarkable. The optic nerves, optic chiasm, and optic tracts have normal signal and  morphology without abnormal enhancement. The suprasellar cistern is clear. The lacrimal glands are symmetric and unremarkable. The superior ophthalmic veins are nondilated. The cavernous sinuses are symmetric and unremarkable.     Stable chronic demyelinating plaque burden in the supratentorial white matter.  No enhancing focus to suggest  active demyelination.  No significant interval brain parenchymal volume loss.     There is no acute intracranial hemorrhage or extra-axial fluid collection identified.  No significant abnormal parenchymal gradient susceptibility. There is no abnormal parenchymal or leptomeningeal enhancement.  There is no restricted diffusion to  suggest acute ischemia/infarction.     The visualized paranasal sinuses and mastoid air cells are unremarkable.  The expected major intracranial flow voids are present.                     Impression  CONCLUSION:       1. No acute intracranial abnormality identified.  No discrete orbital abnormality is seen.  No specific MRI findings to suggest optic neuritis.     2. Stable chronic demyelinating plaque burden in the supratentorial white matter.  No enhancing focus to suggest active demyelination.  No significant interval brain parenchymal volume loss.     Please see above for further details.            Dictated by (CST): Jose Enrique Ahmadi MD on 6/19/2023 at 12:15 PM      Finalized by (CST): Jose Enrique Ahmadi MD on 6/19/2023 at 12:20 PM        Impression & Plan:   1. Erythrocytosis (polycythemia)  - KIMBER-2 negative and normal EPO therefore does not have P vera  - appears to be secondary from testosterone use. He also mentioned some years ago when he stopped testosterone injections his Hgb/hematocrit normalized  - Ongoing since 2010 (from labs available for review through Epic). He says ongoing even before then. Length of time he has had this makes tumor associated polycythemia less likely.   - Also may have sleep apnea (snores when he lays on his back sleeping) and recommend he undergo a sleep study. Takes clonazpem for sleep and prefers to defer for now  - Hematocrit lately has been >54. Previously had no s/sx to suggest hyperviscosity. Has had increased fatigue and blurred vision- unclear whether from hyperviscosity or MS.   - Aware that erythrocytosis is a common adverse effect of  testosterone administration. Erythrocytosis can be concerning as can increase risk of venous thromboembolic disease. The hematocrit should be monitored closely and if it increases above the upper limit of normal, the dose of testosterone should be decreased or stopped. The Endocrine Society guidelines suggest stopping therapy if the hematocrit increases to >=54 percent. After decrease or discontinuation of testosterone and the hematocrit normalizes, a lower dose of testosterone should be continued or restarted. If the hematocrit cannot be kept below the upper limit of normal, even when the serum testosterone concentration is at the low end of the normal range during testosterone treatment, the patient should be evaluated for hypoxia and sleep apnea. If no treatable cause is found, phlebotomy can be considered. He has had phlebotomies in the past. Says QoL significantly affected on lower dose of and off testosterone injections and therefore stopping or lowering dose are not acceptable options he said.   - will recheck hematocrit today. If >54 will proceed with phlebotomy      2. Lymphopenia:   - from [facility]. Known SE of this and monitored by neurology. Typically if lymphocytes <500/mm3 persisting >6 months requires therapy interruption. Lymphopenia did not start until after he was started on DMT.     3. MS  - followed by neuro on [facility].   - recent MRI brain showed stable findings    4. Diarrhea after alcohol   - w/u per GI  - in the interim--> avoidance if possible    Labs today and arrange for phlebotomy as indicated. He was comfortable with the plan     Risk level high- erythrocytosis with s/sx    MD [facility]

## 2024-12-03 NOTE — PROGRESS NOTES
Here for MD fu visit for elevated H&H  Referred by Dr De Leon  Labs done on 11/6- Hgb19.1, HCT54.6  Increase fatigue, vision changes  Stamina fluctuates  No HA  Continues on every 2 week testosterone inj  Inj due tomorrow  Education Record     Learner:  Patient     Disease / Diagnosis:      Barriers / Limitations:  none                Comments:     Method:  Discussion                Comments:     General Topics:  Plan of care reviewed                Comments:     Outcome:  Shows understanding                Comments:  Pt sent to  to schedule phlebotomy for naper

## 2024-12-05 ENCOUNTER — NURSE ONLY (OUTPATIENT)
Dept: HEMATOLOGY/ONCOLOGY | Facility: HOSPITAL | Age: 54
End: 2024-12-05
Attending: INTERNAL MEDICINE
Payer: COMMERCIAL

## 2024-12-05 VITALS
WEIGHT: 230 LBS | TEMPERATURE: 98 F | OXYGEN SATURATION: 94 % | HEIGHT: 67.99 IN | SYSTOLIC BLOOD PRESSURE: 135 MMHG | RESPIRATION RATE: 16 BRPM | BODY MASS INDEX: 34.86 KG/M2 | HEART RATE: 69 BPM | DIASTOLIC BLOOD PRESSURE: 88 MMHG

## 2024-12-05 DIAGNOSIS — D75.1 ERYTHROCYTOSIS: ICD-10-CM

## 2024-12-05 PROCEDURE — 99195 PHLEBOTOMY: CPT

## 2024-12-05 NOTE — PROGRESS NOTES
Patient Name: Lionel Velazquez  : 1970   Medical Record #: [id number]      Therapeutic Donor History    Donor History    When was your last healthy meal? breakfast    When was the last time you did any strenuous activities today? none    Are you feeling ill or unhealthy today:  No    Do you currently have a cold, flu, sore throat, chills, respiratory infection, diarrhea, or any other active infections:  No    Have you had a dental procedure in the past 3 days:  No    Are you currently pregnant:  Not Applicable    Are you allergic to iodine, latex, or chlorhexidine:  No   If yes, specify the type of reaction:      Have you ever had an adverse reaction to a blood donation or been deferred (refused) as a blood donor:  No    Have you had a blood disease, bleeding problem, or recently received blood products in the last 3 months:  No   If yes, please specify:    Have you had epilepsy, convulsions, or frequent fainting spells:  No   If yes, please specify:    History of chronic illness / major illness / surgery:  None of the above   List details on any of the above:    Are you taking pills, medications, or have you received any shots in the last 3 months:  Yes   Comments:    Current Outpatient Medications   Medication Sig Dispense Refill    clonazePAM 1 MG Oral Tab Take 1 tablet (1 mg total) by mouth nightly as needed for Anxiety. 30 tablet 3    VUMERITY 231 MG Oral Capsule Delayed Release TAKE 2 CAPSULES TWICE A  capsule 0    testosterone cypionate 200 mg/mL Intramuscular Solution Inject 1 mL (200 mg total) into the muscle every 14 (fourteen) days. Dose adjustment 10 mL 0    LEVOTHYROXINE 88 MCG Oral Tab Take 1 tablet (88 mcg total) by mouth before breakfast. 90 tablet 0    Triamterene-HCTZ 37.5-25 MG Oral Tab Take 1 tablet by mouth daily. 90 tablet 0    omeprazole 20 MG Oral Capsule Delayed Release Take 1 capsule (20 mg total) by mouth every morning.      Needle, Disp, 18G X 1\" Does not apply Misc Use for  testosterone  each 1    Syringe/Needle, Disp, (BD LUER-IDANIA SYRINGE) 25G X 1\" 3 ML Does not apply Misc To inject testosterone IM q 2 weeks 10 each 1    atorvastatin 40 MG Oral Tab Take 1 tablet (40 mg total) by mouth daily. 90 tablet 0    amLODIPine 10 MG Oral Tab Take 1 tablet (10 mg total) by mouth daily. 90 tablet 0    Syringe/Needle, Disp, (BD LUER-LOCK SYRINGE) 18G X 1-1/2\" 3 ML Does not apply Misc 1 injection every 14 days 50 each 0    Syringe/Needle, Disp, (SYRINGE LUER LOCK) 25G X 1-1/2\" 3 ML Does not apply Misc 1 each every 14 (fourteen) days. Pt using terumo luer lock syringes 100 each 0    azelastine 137 MCG/SPRAY Nasal Solution SPRAY 1 SPRAY INTO EACH NOSTRIL TWICE A DAY 30 mL 1    aspirin 81 MG Oral Tab Take 1 tablet (81 mg total) by mouth daily.      FLUTICASONE PROPIONATE, NASAL, (FLONASE) 50 MCG/ACT Nasal Suspension daily      Fexofenadine HCl (ALLEGRA) 180 MG Oral Tab Take  by mouth daily as needed.         The order for therapeutic phlebotomy and donor history was reviewed.  The donor's physical assessment and history meets acceptance criteria parameters.  This was completed by Libertad ANTHONY    If the patient does not meet the criteria, please explain below:       Pathologist consulted:  No   Pathologist's name:   Outcome / Orders placed:                    Patient Name: Lionel Velazquez  : 1970   Medical Record #: [id number]      Therapeutic Donor Physical and Record of Collection    Order:  Therapeutic Phlebotomy  Order Date:  2024   Expiration Date:  2025  Frequency:  TBD  Parameters:  Therapeutic Phlebotomy for HGB > less than 12  Ordering Physician:  Arun  Fax Number:        /[phone] (BP Location: Left arm, Patient Position: Sitting, Cuff Size: large)   Pulse 69   Temp 98.1 °F (36.7 °C) (Temporal)   Resp 16   Ht 1.727 m (5' 7.99\")   Wt 104.3 kg (230 lb)   SpO2 94%   BMI 34.98 kg/m²       Acceptance Criteria:  Systolic blood pressure   mm/Hg  Diastolic blood  pressure   mm/Hg  Pulse   beats per minute  Temperature  96-99.4 degrees Farenheit  Weight  >110 lbs     Hemoglobin:  19.4  Acceptance Criteria:  HGB 11 GM/DL or above (Repeat if 10.5-10.9)    The order for therapeutic phlebotomy and donor history was reviewed.  The donor's physical assessment meets acceptance criteria parameters.  This was completed by Libertad MAN.    If the patient does not meet the criteria, please explain below:      Collection Information    Arm used:  right    Lot number of collection bag:  [id number]    Collection bag inspection acceptable:  Yes    Donor signature obtained for consent:  Yes    Time collection started:  1516    Time collection completed:  1524    (Discontinue collection at 15 minutes)    Reaction to procedure:  No     If yes, detailed description of reaction:      Weight of unit:  625 grams 501 ml     Patient given post donation instructions:  Yes    Post collection blood pressure:  143/93    Collection completed by:  Libertad MAN    Patient discharged:  Ambulatory    Patient accompanied by:  Other:  self    Patient is stable and was instructed to call the ordering physician with any questions / concerns.

## 2024-12-16 ENCOUNTER — HOSPITAL ENCOUNTER (OUTPATIENT)
Dept: MRI IMAGING | Facility: HOSPITAL | Age: 54
Discharge: HOME | End: 2024-12-16
Attending: PHYSICIAN ASSISTANT
Payer: COMMERCIAL

## 2024-12-16 DIAGNOSIS — R39.89 ABNORMAL PROSTATE EXAM: ICD-10-CM

## 2024-12-16 PROCEDURE — 72197 MRI PELVIS W/O & W/DYE: CPT | Performed by: PHYSICIAN ASSISTANT

## 2024-12-16 PROCEDURE — A9575 INJ GADOTERATE MEGLUMI 0.1ML: HCPCS | Performed by: PHYSICIAN ASSISTANT

## 2024-12-16 RX ORDER — GADOTERATE MEGLUMINE 376.9 MG/ML
20 INJECTION INTRAVENOUS
Status: COMPLETED | OUTPATIENT
Start: 2024-12-16 | End: 2024-12-16

## 2024-12-16 RX ADMIN — GADOTERATE MEGLUMINE 20 ML: 376.9 INJECTION INTRAVENOUS at 19:43:00

## 2024-12-19 DIAGNOSIS — I10 ESSENTIAL HYPERTENSION WITH GOAL BLOOD PRESSURE LESS THAN 130/80: ICD-10-CM

## 2024-12-19 RX ORDER — AMLODIPINE BESYLATE 10 MG/1
10 TABLET ORAL DAILY
Qty: 90 TABLET | Refills: 0 | Status: SHIPPED | OUTPATIENT
Start: 2024-12-19

## 2024-12-19 NOTE — TELEPHONE ENCOUNTER
Last Office Visit: 11/6/24  Last Refill: 9/23/24  Return to Clinic: 6 months   Protocol: passed  NOV: n/a     Requested Prescriptions     Pending Prescriptions Disp Refills    AMLODIPINE 10 MG Oral Tab [Pharmacy Med Name: AMLODIPINE BESYLATE 10 MG TAB] 90 tablet 0     Sig: TAKE 1 TABLET BY MOUTH EVERY DAY       Please approve if appropriate.     Thank you!

## 2024-12-29 DIAGNOSIS — J30.1 ALLERGIC RHINITIS DUE TO POLLEN: ICD-10-CM

## 2024-12-30 RX ORDER — AZELASTINE HYDROCHLORIDE 137 UG/1
1 SPRAY, METERED NASAL 2 TIMES DAILY
Qty: 30 ML | Refills: 0 | Status: SHIPPED | OUTPATIENT
Start: 2024-12-30

## 2024-12-30 NOTE — TELEPHONE ENCOUNTER
Last Office Visit:  11/6/24  Last Refill: 6/5/24  Return to Clinic: 6 months  Protocol: passed  NOV: n/a  Requested Prescriptions     Pending Prescriptions Disp Refills    AZELASTINE 137 MCG/SPRAY Nasal Solution [Pharmacy Med Name: AZELASTINE 0.1% (137 MCG) SPRY]  1     Sig: USE 1 SPRAY INTO EACH NOSTRIL TWICE A DAY         Please approve if appropriate.     Thank you!

## 2025-01-17 DIAGNOSIS — E78.2 MIXED HYPERLIPIDEMIA: ICD-10-CM

## 2025-01-18 RX ORDER — ATORVASTATIN CALCIUM 40 MG/1
40 TABLET, FILM COATED ORAL DAILY
Qty: 90 TABLET | Refills: 1 | Status: SHIPPED | OUTPATIENT
Start: 2025-01-18

## 2025-01-18 NOTE — TELEPHONE ENCOUNTER
Last Office Visit: 11/6/24  Last Refill: 10/21/24  Return to Clinic: 6 months   Protocol: passed  NOV:  n/a   Requested Prescriptions     Pending Prescriptions Disp Refills    ATORVASTATIN 40 MG Oral Tab [Pharmacy Med Name: ATORVASTATIN 40 MG TABLET] 90 tablet 0     Sig: TAKE 1 TABLET BY MOUTH EVERY DAY

## 2025-02-11 RX ORDER — LEVOTHYROXINE SODIUM 88 UG/1
88 TABLET ORAL
Qty: 90 TABLET | Refills: 0 | Status: SHIPPED | OUTPATIENT
Start: 2025-02-11

## 2025-02-16 DIAGNOSIS — R79.89 LOW TESTOSTERONE IN MALE: ICD-10-CM

## 2025-02-17 RX ORDER — LEVOTHYROXINE SODIUM 88 UG/1
88 TABLET ORAL
Qty: 90 TABLET | Refills: 0 | OUTPATIENT
Start: 2025-02-17

## 2025-02-18 RX ORDER — TESTOSTERONE CYPIONATE 200 MG/ML
200 INJECTION, SOLUTION INTRAMUSCULAR
Qty: 6 ML | Refills: 1 | Status: SHIPPED | OUTPATIENT
Start: 2025-02-18

## 2025-02-18 NOTE — TELEPHONE ENCOUNTER
Last Office Visit: 11/06/2024    Last Refill:   Medication Quantity Refills Start End   testosterone cypionate 200 mg/mL Intramuscular Solution 10 mL 0 11/22/2024 --     Return to Clinic: 05/06/2025    Protocol: n/a    Refill pended. Please approve if okay. Thank you.

## 2025-02-19 DIAGNOSIS — J30.1 ALLERGIC RHINITIS DUE TO POLLEN: ICD-10-CM

## 2025-02-19 RX ORDER — AZELASTINE HYDROCHLORIDE 137 UG/1
1 SPRAY, METERED NASAL 2 TIMES DAILY
Qty: 30 ML | Refills: 1 | Status: SHIPPED | OUTPATIENT
Start: 2025-02-19

## 2025-02-19 NOTE — TELEPHONE ENCOUNTER
Last Office Visit: 11/6/24  Last Refill: 12/30/24  Return to Clinic: 6 months   Protocol: passed  NOV: 12/30/24  Requested Prescriptions     Pending Prescriptions Disp Refills    azelastine 137 MCG/SPRAY Nasal Solution [Pharmacy Med Name: AZELASTINE 0.1% (137 MCG) SPRY] 30 mL 1     Sig: SPRAY 1 SPRAY INTO EACH NOSTRIL TWICE A DAY

## 2025-02-21 ENCOUNTER — PATIENT MESSAGE (OUTPATIENT)
Dept: FAMILY MEDICINE CLINIC | Facility: CLINIC | Age: 55
End: 2025-02-21

## 2025-02-21 DIAGNOSIS — G35 MULTIPLE SCLEROSIS (HCC): ICD-10-CM

## 2025-02-21 RX ORDER — DIROXIMEL FUMARATE 231 MG/1
2 CAPSULE ORAL 2 TIMES DAILY
Qty: 360 CAPSULE | Refills: 0 | Status: SHIPPED | OUTPATIENT
Start: 2025-02-21

## 2025-02-21 NOTE — TELEPHONE ENCOUNTER
Medication:  VUMERITY 231 MG Oral Capsule Delayed Release      Date of last refill: 11/25/2024 (#360/0)  [place]     Last office visit: 04/23/2024  Due back to clinic per last office note:  6 Months   Date next office visit scheduled:    No future appointments.        Last OV note recommendation:    Assessment:  This is a 54 y/o male with multiple sclerosis. Continue Vumerity.      Plan:  1. Multiple Sclerosis  - continue Vumerity  - LP with >10 oligoclonal bands and MRI with evidence of demyelinating disease  - LFTs are stable    - RTC in 6 months  - repeat MRI brain and orbits did not show any new lesions           Paul Lincoln, DO  Neurology

## 2025-02-21 NOTE — TELEPHONE ENCOUNTER
Patient is having labs  ordered by Dr. Dias. He is wanting to know if you want to add any lab  orders Patient had labs ordered by you in November    Friend

## 2025-02-25 ENCOUNTER — LAB ENCOUNTER (OUTPATIENT)
Dept: LAB | Age: 55
End: 2025-02-25
Attending: INTERNAL MEDICINE
Payer: COMMERCIAL

## 2025-02-25 DIAGNOSIS — R79.89 LOW TESTOSTERONE IN MALE: ICD-10-CM

## 2025-02-25 DIAGNOSIS — D75.1 ERYTHROCYTOSIS: ICD-10-CM

## 2025-02-25 LAB
BASOPHILS # BLD AUTO: 0.05 X10(3) UL (ref 0–0.2)
BASOPHILS NFR BLD AUTO: 1 %
EOSINOPHIL # BLD AUTO: 0.18 X10(3) UL (ref 0–0.7)
EOSINOPHIL NFR BLD AUTO: 3.5 %
ERYTHROCYTE [DISTWIDTH] IN BLOOD BY AUTOMATED COUNT: 14.6 %
HCT VFR BLD AUTO: 53.2 %
HGB BLD-MCNC: 18.4 G/DL
IMM GRANULOCYTES # BLD AUTO: 0.03 X10(3) UL (ref 0–1)
IMM GRANULOCYTES NFR BLD: 0.6 %
LYMPHOCYTES # BLD AUTO: 0.47 X10(3) UL (ref 1–4)
LYMPHOCYTES NFR BLD AUTO: 9.1 %
MCH RBC QN AUTO: 30.3 PG (ref 26–34)
MCHC RBC AUTO-ENTMCNC: 34.6 G/DL (ref 31–37)
MCV RBC AUTO: 87.6 FL
MONOCYTES # BLD AUTO: 0.55 X10(3) UL (ref 0.1–1)
MONOCYTES NFR BLD AUTO: 10.7 %
NEUTROPHILS # BLD AUTO: 3.86 X10 (3) UL (ref 1.5–7.7)
NEUTROPHILS # BLD AUTO: 3.86 X10(3) UL (ref 1.5–7.7)
NEUTROPHILS NFR BLD AUTO: 75.1 %
PLATELET # BLD AUTO: 172 10(3)UL (ref 150–450)
RBC # BLD AUTO: 6.07 X10(6)UL
TESTOST SERPL-MCNC: 366.52 NG/DL
WBC # BLD AUTO: 5.1 X10(3) UL (ref 4–11)

## 2025-02-25 PROCEDURE — 36415 COLL VENOUS BLD VENIPUNCTURE: CPT

## 2025-02-25 PROCEDURE — 82668 ASSAY OF ERYTHROPOIETIN: CPT

## 2025-02-25 PROCEDURE — 85025 COMPLETE CBC W/AUTO DIFF WBC: CPT

## 2025-02-25 PROCEDURE — 84403 ASSAY OF TOTAL TESTOSTERONE: CPT

## 2025-02-26 LAB — ERYTHROPOIETIN: 25.4 MIU/ML

## 2025-02-28 DIAGNOSIS — I10 ESSENTIAL HYPERTENSION WITH GOAL BLOOD PRESSURE LESS THAN 130/80: ICD-10-CM

## 2025-02-28 DIAGNOSIS — F41.1 GENERALIZED ANXIETY DISORDER: ICD-10-CM

## 2025-03-03 RX ORDER — AMLODIPINE BESYLATE 10 MG/1
10 TABLET ORAL DAILY
Qty: 90 TABLET | Refills: 0 | Status: SHIPPED | OUTPATIENT
Start: 2025-03-03

## 2025-03-03 RX ORDER — CLONAZEPAM 1 MG/1
1 TABLET ORAL NIGHTLY PRN
Qty: 30 TABLET | Refills: 2 | Status: SHIPPED | OUTPATIENT
Start: 2025-03-03

## 2025-03-03 NOTE — TELEPHONE ENCOUNTER
Last Office Visit: 11/6/24  Last Refill: 11/25/24- clonazepam   12/19/24- amlodipine   Return to Clinic: 6 months   Protocol: failed- clonazepam   Passed- amlodipine   NOV: n/a   Requested Prescriptions     Pending Prescriptions Disp Refills    CLONAZEPAM 1 MG Oral Tab [Pharmacy Med Name: CLONAZEPAM 1 MG TABLET] 30 tablet 3     Sig: TAKE 1 TABLET BY MOUTH NIGHTLY AS NEEDED FOR ANXIETY    AMLODIPINE 10 MG Oral Tab [Pharmacy Med Name: AMLODIPINE BESYLATE 10 MG TAB] 90 tablet 0     Sig: TAKE 1 TABLET BY MOUTH EVERY DAY         Please approve if appropriate.     Thank you!

## 2025-03-10 ENCOUNTER — PATIENT MESSAGE (OUTPATIENT)
Dept: FAMILY MEDICINE CLINIC | Facility: CLINIC | Age: 55
End: 2025-03-10

## 2025-03-12 ENCOUNTER — OFFICE VISIT (OUTPATIENT)
Dept: FAMILY MEDICINE CLINIC | Facility: CLINIC | Age: 55
End: 2025-03-12
Payer: COMMERCIAL

## 2025-03-12 ENCOUNTER — HOSPITAL ENCOUNTER (OUTPATIENT)
Dept: GENERAL RADIOLOGY | Age: 55
Discharge: HOME | End: 2025-03-12
Attending: FAMILY MEDICINE
Payer: COMMERCIAL

## 2025-03-12 VITALS
RESPIRATION RATE: 16 BRPM | SYSTOLIC BLOOD PRESSURE: 124 MMHG | HEART RATE: 89 BPM | BODY MASS INDEX: 35.16 KG/M2 | WEIGHT: 232 LBS | TEMPERATURE: 98 F | HEIGHT: 68 IN | DIASTOLIC BLOOD PRESSURE: 74 MMHG

## 2025-03-12 DIAGNOSIS — L30.9 ECZEMA, UNSPECIFIED TYPE: ICD-10-CM

## 2025-03-12 DIAGNOSIS — M54.16 CHRONIC LUMBAR RADICULOPATHY: Primary | ICD-10-CM

## 2025-03-12 DIAGNOSIS — M54.16 CHRONIC LUMBAR RADICULOPATHY: ICD-10-CM

## 2025-03-12 PROCEDURE — 72110 X-RAY EXAM L-2 SPINE 4/>VWS: CPT | Performed by: FAMILY MEDICINE

## 2025-03-12 PROCEDURE — 99214 OFFICE O/P EST MOD 30 MIN: CPT | Performed by: FAMILY MEDICINE

## 2025-03-12 RX ORDER — PREDNISONE 20 MG/1
TABLET ORAL
Qty: 20 TABLET | Refills: 0 | Status: SHIPPED | OUTPATIENT
Start: 2025-03-12

## 2025-03-12 NOTE — PROGRESS NOTES
[facility] Family Medicine Office Note  Chief Complaint:   Chief Complaint   Patient presents with    Rash     Rash on left foot on and off for a few months.     Low Back Pain     Low back pain for 3 months        HPI:   This is a 54 year old male coming in for:    LBP - The patient complaints of back pain.  Pain is located at right low back. Pain is described as aching, cramping, sharp. Severity is mild, moderate. The pain radiates to  right groin and right posterior leg . Pain was precipitated by unknown. Has had for 3  months. Pain is worsened by bending, twisting, walking, lifting. Gets relief of back pain with nothing provides relief. Prior back pain hx: recurrent self limited episodes of low back pain in the past.  Eczema - stable.  Using topical steroid PRN.  No worsening symptoms.      Past Medical History:    Abdominal hernia    Abdominal pain    After having a few drinks    Anxiety    Arthritis    Back pain    Lower right side    Bloating    Body piercing    left ear    Chronic rhinitis    Diarrhea, unspecified    After having a couple of drinks. Lasts about 3 days    Heartburn    High cholesterol    Hyperlipidemia    HYPOTHYROIDISM    Hypothyroidism    hashimoto's    Multiple sclerosis (HCC)    Obesity    Fat but loosing weight    Other abnormal glucose    Loss of peripheral vision    OTHER DISEASES    hypogonadism    Pain in joints    Shingles    [place]    Sleep apnea    Sleep disturbance    Thyroid disease    Unspecified essential hypertension    Wears glasses    Weight gain     Past Surgical History:   Procedure Laterality Date    Arthroscopy of joint unlisted      yoav shoulder    Colonoscopy      Hernia surgery  8/2007,11/2009    x3    Other surgical history  09/2008    SINUS x2     Social History:  Social History     Socioeconomic History    Marital status:    Tobacco Use    Smoking status: Former     Current packs/day: 0.00     Average packs/day: 1 pack/day for 20.0 years (20.0  ttl pk-yrs)     Types: Cigarettes     Start date: 1980     Quit date: 2000     Years since quittin.1    Smokeless tobacco: Never   Vaping Use    Vaping status: Never Used   Substance and Sexual Activity    Alcohol use: Yes     Alcohol/week: 3.0 standard drinks of alcohol     Types: 3 Shots of liquor per week     Comment: SOCIAL, 2-3 per week    Drug use: Not Currently     Types: Cannabis     Comment: Cannabis like 1-2X a year max.   Other Topics Concern    Caffeine Concern Yes    Stress Concern No    Weight Concern Yes     Comment: currently loosing weight.    Special Diet No    Exercise Yes     Comment: Not often    Seat Belt Yes     Social Drivers of Health      Received from [employer], [employer]     Family History:  Family History   Problem Relation Age of Onset    Cancer Father         GE junction    Alcohol and Other Disorders Associated Father     Colon Cancer Father     Hypertension Father     Lipids Father     Heart Disorder Father     Cancer Maternal Grandmother         breast    Breast Cancer Maternal Grandmother         Grandmother on moms side    Heart Disorder Maternal Grandfather     Cancer Maternal Grandfather     Heart Attack Maternal Grandfather     Heart Attack Paternal Grandmother     Heart Disorder Paternal Grandmother     Cancer Paternal Grandfather     Cancer Paternal Uncle     Lipids Brother      Allergies:  Allergies[1]  Current Meds:  Current Outpatient Medications   Medication Sig Dispense Refill    predniSONE 20 MG Oral Tab 3 tabs PO daily x 3d, 2 tabs PO daily x 3d, 1 tab PO daily x 3d, 1/2 tab PO daily x 3d 20 tablet 0    CLONAZEPAM 1 MG Oral Tab TAKE 1 TABLET BY MOUTH NIGHTLY AS NEEDED FOR ANXIETY 30 tablet 2    AMLODIPINE 10 MG Oral Tab TAKE 1 TABLET BY MOUTH EVERY DAY 90 tablet 0    VUMERITY 231 MG Oral Capsule Delayed Release TAKE 2 CAPSULES TWICE A  capsule 0    azelastine 137 MCG/SPRAY Nasal Solution SPRAY 1 SPRAY INTO EACH NOSTRIL TWICE A  DAY 30 mL 1    TESTOSTERONE CYPIONATE 200 mg/mL Intramuscular Solution INJECT 1ML INTO THE MUSCLE EVERY 14 DAYS 6 mL 1    LEVOTHYROXINE 88 MCG Oral Tab TAKE 1 TABLET BY MOUTH BEFORE BREAKFAST 90 tablet 0    atorvastatin 40 MG Oral Tab TAKE 1 TABLET BY MOUTH EVERY DAY 90 tablet 1    Triamterene-HCTZ 37.5-25 MG Oral Tab Take 1 tablet by mouth daily. 90 tablet 0    omeprazole 20 MG Oral Capsule Delayed Release Take 1 capsule (20 mg total) by mouth every morning.      Needle, Disp, 18G X 1\" Does not apply Misc Use for testosterone  each 1    Syringe/Needle, Disp, (BD LUER-IDANIA SYRINGE) 25G X 1\" 3 ML Does not apply Misc To inject testosterone IM q 2 weeks 10 each 1    Syringe/Needle, Disp, (BD LUER-LOCK SYRINGE) 18G X 1-1/2\" 3 ML Does not apply Misc 1 injection every 14 days 50 each 0    Syringe/Needle, Disp, (SYRINGE LUER LOCK) 25G X 1-1/2\" 3 ML Does not apply Misc 1 each every 14 (fourteen) days. Pt using terumo luer lock syringes 100 each 0    aspirin 81 MG Oral Tab Take 1 tablet (81 mg total) by mouth daily.      FLUTICASONE PROPIONATE, NASAL, (FLONASE) 50 MCG/ACT Nasal Suspension daily      Fexofenadine HCl (ALLEGRA) 180 MG Oral Tab Take  by mouth daily as needed.        Counseling given: Not Answered       REVIEW OF SYSTEMS:   ROS:  CONSTITUTIONAL:  Denies any unusual weight gain/loss, fever, chills, weakness or fatigue.  CARDIOVASCULAR:  Denies chest pain, chest pressure or chest discomfort. No palpitations or edema.  Denies any dyspnea on exertion or at rest  RESPIRATORY:  Denies shortness of breath, cough  GASTROINTESTINAL:  Denies any abdominal pain, nausea, vomiting  NEUROLOGICAL:  Denies headache, dizziness, syncope, numbness or tingling in the extremities.  MUSCULOSKELETAL:  + low back pain  ALLERGY: + eczema    EXAM:   /74   Pulse 89   Temp 98.3 °F (36.8 °C) (Temporal)   Resp 16   Ht 5' 8\" (1.727 m)   Wt 232 lb (105.2 kg)   BMI 35.28 kg/m²  Estimated body mass index is 35.28 kg/m² as  calculated from the following:    Height as of this encounter: 5' 8\" (1.727 m).    Weight as of this encounter: 232 lb (105.2 kg).   Vital signs reviewed.Appears stated age, well groomed.  Physical Exam:  GEN:  Patient is alert and oriented x3, no apparent distress  HEAD:  Normocephalic, atraumatic  LUNGS: clear to auscultation bilaterally, no rales/rhonchi/wheezing  HEART:  Regular rate and rhythm, no murmurs, rubs or gallops  ABDOMEN:  Soft, nondistended, nontender, bowel sounds normal in all 4 quadrants, no hepatosplenomegaly, no inguinal hernia on the right  EXTREMITIES:  Strength intact with 5/5 bilaterally upper and lower extremities, no edema noted  BACK: + tight bilateral lumbar paraspinal muscles, negative SLR test BLE, + tight bilateral hamstring muscles  NEURO:  CN 2 - 12 grossly intact     ASSESSMENT AND PLAN:   1. Chronic lumbar radiculopathy  -  worsening  -  concern for stenosis at L1/L2 based on clinical histroy  -  check lumbar xray  -  start prednisone taper  -  if xrays with mild findings, will refer to PT and if significant findings noted, consider either physiatry or neurosurgery consult  - XR LUMBAR SPINE (MIN 4 VIEWS) (CPT=72110); Future  - predniSONE 20 MG Oral Tab; 3 tabs PO daily x 3d, 2 tabs PO daily x 3d, 1 tab PO daily x 3d, 1/2 tab PO daily x 3d  Dispense: 20 tablet; Refill: 0    2. Eczema, unspecified type  -  chronic  -  continue triamcinolone cream PRN for no more than 2 weeks per application      Meds & Refills for this Visit:  Requested Prescriptions     Signed Prescriptions Disp Refills    predniSONE 20 MG Oral Tab 20 tablet 0     Sig: 3 tabs PO daily x 3d, 2 tabs PO daily x 3d, 1 tab PO daily x 3d, 1/2 tab PO daily x 3d       Health Maintenance:  Health Maintenance Due   Topic Date Due    Pneumococcal Vaccine: 50+ Years (1 of 2 - PCV) Never done    Zoster Vaccines (1 of 2) Never done    COVID-19 Vaccine (4 - 2024-25 season) 09/01/2024    Influenza Vaccine (1) 10/01/2024    Annual  Depression Screening  01/01/2025       Patient/Caregiver Education: Patient/Caregiver Education: There are no barriers to learning. Medical education done.   Outcome: Patient verbalizes understanding. Patient is notified to call with any questions, complications, allergies, or worsening or changing symptoms.  Patient is to call with any side effects or complications from the treatments as a result of today.     Problem List:  Patient Active Problem List   Diagnosis    Obesity    Polycythemia    Male hypogonadism    H/O Hashimoto thyroiditis    Hypertension    Special screening for malignant neoplasm of prostate    Hyperlipidemia    Hypothyroidism due to acquired atrophy of thyroid    Acute maxillary sinusitis    FH: gastric cancer    Blurry vision, left eye    Multiple sclerosis (HCC)    Testicular hypofunction    Low testosterone in male    History of adenomatous polyp of colon    Chronic sinusitis    Tubular adenoma    Benign essential hypertension    Benign prostatic hyperplasia    Diverticulosis of colon    Hypothyroidism    Incomplete right bundle branch block    Internal hemorrhoids    Lumbar radiculopathy    Polycythemia vera (HCC)    Impotence of organic origin    Morbid obesity (HCC)    Relapsing remitting multiple sclerosis (HCC)    Well adult health check    Benign neoplasm of transverse colon    Benign neoplasm of sigmoid colon    Disorder of kidney and ureter    Hypogonadism in male    Polycythemia vera (HCC)    Postprocedural state    Incidental finding of severe acute respiratory syndrome coronavirus 2 (SARS-CoV-2) infection    Screening for malignant neoplasm of prostate    Abdominal pain    Abnormal feces    Chronic prostatitis    Disorder of male genital organ    Inguinal hernia    Low back pain    Diarrhea, unspecified    Heartburn       ESTELLE REA, DO    Please note that portions of this note may have been completed with a voice recognition program. Efforts were made to edit the  dictations but occasionally words are mis-transcribed.         [1]   Allergies  Allergen Reactions    Dust     Grass     Losartan      Lightheaded,sob,muscle weakness    Mold     Penicillins RASH    Reglan [Metoclopramide] OTHER (SEE COMMENTS)     Pt starts to feel hot and flushed and trouble breathing    Seasonal OTHER (SEE COMMENTS)     Sinus congestion

## 2025-03-17 ENCOUNTER — PATIENT MESSAGE (OUTPATIENT)
Dept: FAMILY MEDICINE CLINIC | Facility: CLINIC | Age: 55
End: 2025-03-17

## 2025-03-17 NOTE — TELEPHONE ENCOUNTER
Its a taper so would not extend or repeat dose.  If he wants an injection he will need to be off oral steroids for at least 2 weeks anyway.

## 2025-03-27 ENCOUNTER — OFFICE VISIT (OUTPATIENT)
Dept: SURGERY | Facility: CLINIC | Age: 55
End: 2025-03-27
Payer: COMMERCIAL

## 2025-03-27 VITALS
DIASTOLIC BLOOD PRESSURE: 72 MMHG | WEIGHT: 232 LBS | HEART RATE: 90 BPM | SYSTOLIC BLOOD PRESSURE: 122 MMHG | BODY MASS INDEX: 35.16 KG/M2 | HEIGHT: 68 IN

## 2025-03-27 DIAGNOSIS — M54.16 CHRONIC RIGHT-SIDED LUMBAR RADICULOPATHY: Primary | ICD-10-CM

## 2025-03-27 PROCEDURE — 99204 OFFICE O/P NEW MOD 45 MIN: CPT

## 2025-03-27 RX ORDER — MELOXICAM 7.5 MG/1
7.5 TABLET ORAL DAILY
Qty: 30 TABLET | Refills: 0 | Status: SHIPPED | OUTPATIENT
Start: 2025-03-27

## 2025-03-27 RX ORDER — ALBUTEROL SULFATE 90 UG/1
INHALANT RESPIRATORY (INHALATION)
COMMUNITY
Start: 2025-01-06

## 2025-03-27 NOTE — PATIENT INSTRUCTIONS
Refill policies:    Allow 2-3 business days for refills; controlled substances may take longer.  Contact your pharmacy at least 5 days prior to running out of medication and have them send an electronic request or submit request through the “request refill” option in your [employer] account.  Refills are not addressed on weekends; covering physicians do not authorize routine medications on weekends.  No narcotics or controlled substances are refilled after noon on Fridays or by on call physicians.  By law, narcotics must be electronically prescribed.  A 30 day supply with no refills is the maximum allowed.  If your prescription is due for a refill, you may be due for a follow up appointment.  To best provide you care, patients receiving routine medications need to be seen at least once a year.  Patients receiving narcotic/controlled substance medications need to be seen at least once every 3 months.  In the event that your preferred pharmacy does not have the requested medication in stock (e.g. Backordered), it is your responsibility to find another pharmacy that has the requested medication available.  We will gladly send a new prescription to that pharmacy at your request.    Scheduling Tests:    If your physician has ordered radiology tests such as MRI or CT scans, please contact Central Scheduling at [phone] right away to schedule the test.  Once scheduled, the [facility] Centralized Referral Team will work with your insurance carrier to obtain pre-certification or prior authorization.  Depending on your insurance carrier, approval may take 3-10 days.  It is highly recommended patients assure they have received an authorization before having a test performed.  If test is done without insurance authorization, patient may be responsible for the entire amount billed.      Precertification and Prior Authorizations:  If your physician has recommended that you have a procedure or additional testing performed the [facility]  Centralized Referral Team will contact your insurance carrier to obtain pre-certification or prior authorization.    You are strongly encouraged to contact your insurance carrier to verify that your procedure/test has been approved and is a COVERED benefit.  Although the [facility] Centralized Referral Team does its due diligence, the insurance carrier gives the disclaimer that \"Although the procedure is authorized, this does not guarantee payment.\"    Ultimately the patient is responsible for payment.   Thank you for your understanding in this matter.  Paperwork Completion:  If you require FMLA or disability paperwork for your recovery, please make sure to either drop it off or have it faxed to our office at [phone]. Be sure the form has your name and date of birth on it.  The form will be faxed to our Forms Department and they will complete it for you.  There is a 25$ fee for all forms that need to be filled out.  Please be aware there is a 10-14 day turnaround time.  You will need to sign a release of information (MARLIN) form if your paperwork does not come with one.  You may call the Forms Department with any questions at [phone].  Their fax number is [phone].

## 2025-03-27 NOTE — PROGRESS NOTES
[facility]  Neurosurgery Consultation  2025    Lionel Velazquez PCP:  ESTELLE REA, DO    1970 MRN [id number]     Reason for Visit: Chronic right lumbar radiculopathy      HPI     Lionel Velazquez is a very pleasant 54 year old male with PMH of MS, Hashimoto's thyroiditis, HTN, HLD, diverticulosis who presents for Neurosurgical consultation for chronic lumbar radiculopathy.    Patient reports atraumatic onset of symptoms 1 year ago, progressively worsening.  He endorses right-sided LBP radiating to the R groin and down the anterior and posterior R thigh, stopping at the knee.  He reports slight subjective RLE weakness, but admits is difficult to distinguish between baseline weakness secondary to MS.  Pain is aggravated by prolonged standing and weight lifting.  He has had to reduce the number of days per week he works out, as this was exacerbating his pain.  Pain does not vary based on time of day.  He denies numbness or tingling of UE or LE.  Denies neck pain. Denies changes in bowel/bladder habits or saddle anesthesia.  He is ambulatory without assistive device. At times has an unsteady gait secondary to MS. He saw his PCP 2 weeks ago and was prescribed a prednisone taper, which provided excellent relief.  XR lumbar spine shows multilevel DDD and DJD with mild levoscoliosis.    No prior PT or pain services.  No hx of spine surgeries.     HISTORY     Past Medical History:    Abdominal hernia    Abdominal pain    After having a few drinks    Anxiety    Arthritis    Back pain    Lower right side    Bloating    Body piercing    left ear    Chronic rhinitis    Diarrhea, unspecified    After having a couple of drinks. Lasts about 3 days    Heartburn    High cholesterol    Hyperlipidemia    HYPOTHYROIDISM    Hypothyroidism    hashimoto's    Multiple sclerosis (HCC)    Obesity    Fat but loosing weight    Other abnormal glucose    Loss of peripheral vision    OTHER DISEASES     hypogonadism    Pain in joints    Shingles    Asif Rasmussen    Sleep apnea    Sleep disturbance    Thyroid disease    Unspecified essential hypertension    Wears glasses    Weight gain      Past Surgical History:   Procedure Laterality Date    Arthroscopy of joint unlisted      yoav shoulder    Colonoscopy      Hernia surgery  2007,11/2009    x3    Other surgical history  2008    SINUS x2      Family History   Problem Relation Age of Onset    Cancer Father         GE junction    Alcohol and Other Disorders Associated Father     Colon Cancer Father     Hypertension Father     Lipids Father     Heart Disorder Father     Cancer Maternal Grandmother         breast    Breast Cancer Maternal Grandmother         Grandmother on moms side    Heart Disorder Maternal Grandfather     Cancer Maternal Grandfather     Heart Attack Maternal Grandfather     Heart Attack Paternal Grandmother     Heart Disorder Paternal Grandmother     Cancer Paternal Grandfather     Cancer Paternal Uncle     Lipids Brother       Social History     Socioeconomic History    Marital status:    Tobacco Use    Smoking status: Former     Current packs/day: 0.00     Average packs/day: 1 pack/day for 20.0 years (20.0 ttl pk-yrs)     Types: Cigarettes     Start date: 1980     Quit date: 2000     Years since quittin.1    Smokeless tobacco: Never   Vaping Use    Vaping status: Never Used   Substance and Sexual Activity    Alcohol use: Yes     Alcohol/week: 3.0 standard drinks of alcohol     Types: 3 Shots of liquor per week     Comment: SOCIAL, 2-3 per week    Drug use: Not Currently     Types: Cannabis     Comment: Cannabis like 1-2X a year max.   Other Topics Concern    Caffeine Concern Yes    Stress Concern No    Weight Concern Yes     Comment: currently loosing weight.    Special Diet No    Exercise Yes     Comment: Not often    Seat Belt Yes      Allergies[1]     CURRENT MEDICATIONS     Current Outpatient Medications   Medication  Sig Dispense Refill    albuterol 108 (90 Base) MCG/ACT Inhalation Aero Soln INHALE 2 PUFFS 4 TIMES A DAY AS NEEDED FOR SHORTNESS OF BREATH OR FOR WHEEZE      CLONAZEPAM 1 MG Oral Tab TAKE 1 TABLET BY MOUTH NIGHTLY AS NEEDED FOR ANXIETY 30 tablet 2    AMLODIPINE 10 MG Oral Tab TAKE 1 TABLET BY MOUTH EVERY DAY 90 tablet 0    VUMERITY 231 MG Oral Capsule Delayed Release TAKE 2 CAPSULES TWICE A  capsule 0    azelastine 137 MCG/SPRAY Nasal Solution SPRAY 1 SPRAY INTO EACH NOSTRIL TWICE A DAY 30 mL 1    TESTOSTERONE CYPIONATE 200 mg/mL Intramuscular Solution INJECT 1ML INTO THE MUSCLE EVERY 14 DAYS 6 mL 1    LEVOTHYROXINE 88 MCG Oral Tab TAKE 1 TABLET BY MOUTH BEFORE BREAKFAST 90 tablet 0    atorvastatin 40 MG Oral Tab TAKE 1 TABLET BY MOUTH EVERY DAY 90 tablet 1    Triamterene-HCTZ 37.5-25 MG Oral Tab Take 1 tablet by mouth daily. 90 tablet 0    omeprazole 20 MG Oral Capsule Delayed Release Take 1 capsule (20 mg total) by mouth every morning.      Needle, Disp, 18G X 1\" Does not apply Misc Use for testosterone  each 1    Syringe/Needle, Disp, (BD LUER-IDANIA SYRINGE) 25G X 1\" 3 ML Does not apply Misc To inject testosterone IM q 2 weeks 10 each 1    Syringe/Needle, Disp, (BD LUER-LOCK SYRINGE) 18G X 1-1/2\" 3 ML Does not apply Misc 1 injection every 14 days 50 each 0    Syringe/Needle, Disp, (SYRINGE LUER LOCK) 25G X 1-1/2\" 3 ML Does not apply Misc 1 each every 14 (fourteen) days. Pt using terumo luer lock syringes 100 each 0    aspirin 81 MG Oral Tab Take 1 tablet (81 mg total) by mouth daily.      FLUTICASONE PROPIONATE, NASAL, (FLONASE) 50 MCG/ACT Nasal Suspension daily      Fexofenadine HCl (ALLEGRA) 180 MG Oral Tab Take  by mouth daily as needed.          REVIEW OF SYSTEMS     Comprehensive review of systems done. Negative except what is outlined in the above HPI.     PHYSICAL EXAMIMATION     VITALS:  height is 68\" and weight is 232 lb (105.2 kg). His blood pressure is 122/72 and his pulse is 90.      GENERAL: No apparent distress, non-toxic appearing. Sitting comfortably in examination chair.     MUSCULOSKELETAL: Even tone. Moving bilateral upper and lower extremities spontaneously and against resistance.  Nontender on palpation of right and left SI joints.    Physical Exam  Musculoskeletal:        Back:         SKIN: Warm, dry.     NEURO: Alert and oriented x3.  Face is symmetric.       SPINE:  Gait/Coordination:  Gait intact, non-antalgic.   Able to deep knee bend on one foot bilaterally, reports equal strength.    Reports equal strength with toe and heel balance bilaterally.    Sensation:   Sensation to light touch intact to bilateral upper and lower extremities.    ROM:   Lumbar Flexion   Pain free   Lumbar Extension +Pain   Lumbar Rotation  Pain free    Palpation:   Non-tender to palpation of midline lumbar spine . Non-tender to palpation of right and left lumbar   paraspinal muscles.    LOWER EXTREMITY STRENGTH:    Iliospoas  Hamstrings  Quads  D-flexion  P-flexion EHL     Right 5 5 5 5 5 5     Left 5 5 5 5 5 5       DTRs:     Biceps    Triceps   Brachioradialis     Patellar     Ankle     Right       2+         2+            2+         2+        2+     Left       2+         2+             2+         2+        2+      IMAGING:     XR LUMBAR SPINE (MIN 4 VIEWS) (CPT=72110)    Result Date: 3/12/2025  CONCLUSION:  Degenerative changes as above with levoscoliosis.   LOCATION:  [facility]   Dictated by (CST): Jd Cartagena MD on 3/12/2025 at 4:01 PM     Finalized by (CST): Jd Cartagena MD on 3/12/2025 at 4:05 PM         MEDICAL DECISION MAKING:     ASSESSMENT:  1. Chronic right-sided lumbar radiculopathy      PLAN:   Start PT  Patient requests MRI lumbar spine given persist symptoms lasting 1 year, recently progressing  Rx for Meloxicam 7.5 mg daily  Advised to take medication with food to minimize GI upset.  Instructed not to take other NSAID's while taking this medication.  F/U after imaging, or sooner for  any new, worsening, or concerning  signs or symptoms.     I reviewed the plan of care with the patient at length. All questions and concerns were addressed at this time. The patient verbalized understanding, agrees with the plan, and was appreciative.    Total visit time: 45 minutes; More than 50% spent coordinating care, counseling, reviewing imaging and discussing medication therapy.     Malu Rendon, MSN, APRN, FNP-[facility]  Neurosurgery   [address] [place] [address], [address]  [place] [address]  [phone]          [1]   Allergies  Allergen Reactions    Dust     Grass     Losartan      Lightheaded,sob,muscle weakness    Mold     Penicillins RASH    Reglan [Metoclopramide] OTHER (SEE COMMENTS)     Pt starts to feel hot and flushed and trouble breathing    Seasonal OTHER (SEE COMMENTS)     Sinus congestion

## 2025-03-27 NOTE — PROGRESS NOTES
New patient:  Reason for visit: lower back pain     Estimated time of onset:  months    Numeric Rating Scale:       Pain at Present:  4/10                                                                                                                       Distribution of Pain:    right states he will have pain in the groin area and N/T in the right leg     Past Treatments for Current Pain Condition:     No passed PT   NO passed injections   Patient states he was on predniSONE 20 MG Oral Tab   Response to treatment: some relief    Prior diagnostic testing related to this condition:  imaging in chart

## 2025-04-17 ENCOUNTER — PATIENT MESSAGE (OUTPATIENT)
Dept: FAMILY MEDICINE CLINIC | Facility: CLINIC | Age: 55
End: 2025-04-17

## 2025-04-17 NOTE — TELEPHONE ENCOUNTER
Left a message on voicemail to call back.  Per Dr. Haley mora to see Radha Nicholson for STD testing.

## 2025-04-18 ENCOUNTER — LAB ENCOUNTER (OUTPATIENT)
Dept: LAB | Age: 55
End: 2025-04-18
Attending: FAMILY MEDICINE
Payer: COMMERCIAL

## 2025-04-18 ENCOUNTER — OFFICE VISIT (OUTPATIENT)
Dept: FAMILY MEDICINE CLINIC | Facility: CLINIC | Age: 55
End: 2025-04-18
Payer: COMMERCIAL

## 2025-04-18 VITALS
DIASTOLIC BLOOD PRESSURE: 88 MMHG | TEMPERATURE: 97 F | SYSTOLIC BLOOD PRESSURE: 124 MMHG | RESPIRATION RATE: 16 BRPM | BODY MASS INDEX: 35.22 KG/M2 | WEIGHT: 232.38 LBS | HEART RATE: 88 BPM | HEIGHT: 68 IN

## 2025-04-18 DIAGNOSIS — D75.1 ERYTHROCYTOSIS: ICD-10-CM

## 2025-04-18 DIAGNOSIS — Z11.3 SCREENING FOR STDS (SEXUALLY TRANSMITTED DISEASES): ICD-10-CM

## 2025-04-18 DIAGNOSIS — Z11.3 SCREENING FOR STDS (SEXUALLY TRANSMITTED DISEASES): Primary | ICD-10-CM

## 2025-04-18 DIAGNOSIS — D75.1 POLYCYTHEMIA, SECONDARY: Primary | ICD-10-CM

## 2025-04-18 LAB
BASOPHILS # BLD AUTO: 0.04 X10(3) UL (ref 0–0.2)
BASOPHILS NFR BLD AUTO: 0.7 %
EOSINOPHIL # BLD AUTO: 0.08 X10(3) UL (ref 0–0.7)
EOSINOPHIL NFR BLD AUTO: 1.4 %
ERYTHROCYTE [DISTWIDTH] IN BLOOD BY AUTOMATED COUNT: 14.7 %
HBV SURFACE AB SER QL: NONREACTIVE
HBV SURFACE AB SERPL IA-ACNC: 3.48 MIU/ML
HBV SURFACE AG SER-ACNC: <0.1 [IU]/L
HBV SURFACE AG SERPL QL IA: NONREACTIVE
HCT VFR BLD AUTO: 54.5 % (ref 39–53)
HCV AB SERPL QL IA: NONREACTIVE
HGB BLD-MCNC: 18.4 G/DL (ref 13–17.5)
IMM GRANULOCYTES # BLD AUTO: 0.03 X10(3) UL (ref 0–1)
IMM GRANULOCYTES NFR BLD: 0.5 %
LYMPHOCYTES # BLD AUTO: 0.41 X10(3) UL (ref 1–4)
LYMPHOCYTES NFR BLD AUTO: 7.4 %
MCH RBC QN AUTO: 30.6 PG (ref 26–34)
MCHC RBC AUTO-ENTMCNC: 33.8 G/DL (ref 31–37)
MCV RBC AUTO: 90.7 FL (ref 80–100)
MONOCYTES # BLD AUTO: 0.53 X10(3) UL (ref 0.1–1)
MONOCYTES NFR BLD AUTO: 9.5 %
NEUTROPHILS # BLD AUTO: 4.46 X10 (3) UL (ref 1.5–7.7)
NEUTROPHILS # BLD AUTO: 4.46 X10(3) UL (ref 1.5–7.7)
NEUTROPHILS NFR BLD AUTO: 80.5 %
PLATELET # BLD AUTO: 205 10(3)UL (ref 150–450)
RBC # BLD AUTO: 6.01 X10(6)UL (ref 4.3–5.7)
T PALLIDUM AB SER QL IA: NONREACTIVE
WBC # BLD AUTO: 5.6 X10(3) UL (ref 4–11)

## 2025-04-18 PROCEDURE — 87661 TRICHOMONAS VAGINALIS AMPLIF: CPT

## 2025-04-18 PROCEDURE — 87591 N.GONORRHOEAE DNA AMP PROB: CPT

## 2025-04-18 PROCEDURE — 87491 CHLMYD TRACH DNA AMP PROBE: CPT

## 2025-04-18 PROCEDURE — 87340 HEPATITIS B SURFACE AG IA: CPT

## 2025-04-18 PROCEDURE — 36415 COLL VENOUS BLD VENIPUNCTURE: CPT

## 2025-04-18 PROCEDURE — 86803 HEPATITIS C AB TEST: CPT

## 2025-04-18 PROCEDURE — 99213 OFFICE O/P EST LOW 20 MIN: CPT

## 2025-04-18 PROCEDURE — 87389 HIV-1 AG W/HIV-1&-2 AB AG IA: CPT

## 2025-04-18 PROCEDURE — 85025 COMPLETE CBC W/AUTO DIFF WBC: CPT

## 2025-04-18 PROCEDURE — 86780 TREPONEMA PALLIDUM: CPT

## 2025-04-18 PROCEDURE — 87801 DETECT AGNT MULT DNA AMPLI: CPT

## 2025-04-18 PROCEDURE — 82668 ASSAY OF ERYTHROPOIETIN: CPT

## 2025-04-18 PROCEDURE — 86706 HEP B SURFACE ANTIBODY: CPT

## 2025-04-18 RX ORDER — PERFLUOROHEXYLOCTANE 1 MG/MG
SOLUTION OPHTHALMIC
COMMUNITY
Start: 2025-03-29

## 2025-04-18 NOTE — PROGRESS NOTES
[facility] Family Medicine Office Note  Chief Complaint:   Chief Complaint   Patient presents with    STD       HPI:   This is a 54 year old male coming in for a STD check.  States has been in a monogamous relationship for the last 15 years.  States recently his wife has been noting vaginal discharge and odor for several days after intercourse.  Patient denies any symptoms.  States that he has been doing research on [employer] and is afraid that he may have an STD that has been dormant.        Past Medical History[1]  Past Surgical History[2]  Social History:  Short Social Hx on File[3]  Family History:  Family History[4]  Allergies:  Allergies[5]  Current Meds:  Current Medications[6]   Counseling given: Not Answered       REVIEW OF SYSTEMS:   ROS:  CONSTITUTIONAL:  Denies any unusual weight gain/loss, fever, chills, weakness or fatigue.  HEENT:  Eyes:  Denies visual loss, blurred vision, double vision or yellow sclerae.   CARDIOVASCULAR:  Denies chest pain, chest pressure or chest discomfort. No palpitations or edema.  Denies any dyspnea on exertion or at rest  RESPIRATORY:  Denies shortness of breath, cough  GASTROINTESTINAL:  Denies any abdominal pain, nausea, vomiting, constipation, diarrhea, or blood in stool.  : Denies any urinary symptoms, penile lesions or discharge, or testicular swelling.  NEUROLOGICAL:  Denies headache, dizziness, syncope, numbness or tingling in the extremities.  LYMPHATICS:  Denies enlarged nodes.      EXAM:   /88   Pulse 88   Temp 97 °F (36.1 °C) (Temporal)   Resp 16   Ht 5' 8\" (1.727 m)   Wt 232 lb 6.4 oz (105.4 kg)   BMI 35.34 kg/m²  Estimated body mass index is 35.34 kg/m² as calculated from the following:    Height as of this encounter: 5' 8\" (1.727 m).    Weight as of this encounter: 232 lb 6.4 oz (105.4 kg).   Vital signs reviewed.Appears stated age, well groomed.  Physical Exam:  GEN:  Patient is alert and oriented x3, no apparent distress  HEAD:   Normocephalic, atraumatic  HEENT:  Eyes: EOMI, PERRLA, no scleral icterus, conjunctivae clear bilaterally.    LUNGS: clear to auscultation bilaterally, no rales/rhonchi/wheezing  HEART:  Regular rate and rhythm, no murmurs, rubs or gallops  : Two descended testes without mass. No penile lesions or discharge noted.  EXTREMITIES:  Strength intact with 5/5 bilaterally upper and lower extremities, no edema noted  NEURO:  CN 2 - 12 grossly intact     ASSESSMENT AND PLAN:   1. Screening for STDs (sexually transmitted diseases)  Patient currently asymptomatic. STD panel ordered. Will follow-up with results. Advised patient to have wife get exam as well.     - T Pallidum Screening [place]; Future  - HIV AG AB Combo (Consent Obtained prechecked); Future  - Urine Chlamydia/GC Amplification; Future  - Hepatitis B Surface Antibody; Future  - Hepatitis B Surface Antigen; Future  - HCV Antibody; Future  - Trichomonas Vaginalis, RYDER (Urethral/Urine); Future      Meds & Refills for this Visit:  Requested Prescriptions      No prescriptions requested or ordered in this encounter       Health Maintenance:  Health Maintenance Due   Topic Date Due    Pneumococcal Vaccine: 50+ Years (1 of 2 - PCV) Never done    Zoster Vaccines (1 of 2) Never done    COVID-19 Vaccine (4 - 2024-25 season) 09/01/2024    Annual Depression Screening  01/01/2025       Patient/Caregiver Education: Patient/Caregiver Education: There are no barriers to learning. Medical education done.   Outcome: Patient verbalizes understanding. Patient is notified to call with any questions, complications, allergies, or worsening or changing symptoms.  Patient is to call with any side effects or complications from the treatments as a result of today.     Problem List:  Problem List[7]    RADHA Linda    Please note that portions of this note may have been completed with a voice recognition program. Efforts were made to edit the dictations but occasionally words are  mis-transcribed.         [1]   Past Medical History:   Abdominal hernia    Abdominal pain    After having a few drinks    Anxiety    Arthritis    Back pain    Lower right side    Bloating    Body piercing    left ear    Chronic rhinitis    Diarrhea, unspecified    After having a couple of drinks. Lasts about 3 days    Heartburn    High cholesterol    Hyperlipidemia    HYPOTHYROIDISM    Hypothyroidism    hashimoto's    Multiple sclerosis (HCC)    Obesity    Fat but loosing weight    Other abnormal glucose    Loss of peripheral vision    OTHER DISEASES    hypogonadism    Pain in joints    Shingles    [place]    Sleep apnea    Sleep disturbance    Thyroid disease    Unspecified essential hypertension    Wears glasses    Weight gain   [2]   Past Surgical History:  Procedure Laterality Date    Arthroscopy of joint unlisted      yoav shoulder    Colonoscopy      Hernia surgery  2007,11/2009    x3    Other surgical history  2008    SINUS x2   [3]   Social History  Socioeconomic History    Marital status:    Tobacco Use    Smoking status: Former     Current packs/day: 0.00     Average packs/day: 1 pack/day for 20.0 years (20.0 ttl pk-yrs)     Types: Cigarettes     Start date: 1980     Quit date: 2000     Years since quittin.2    Smokeless tobacco: Never   Vaping Use    Vaping status: Never Used   Substance and Sexual Activity    Alcohol use: Yes     Alcohol/week: 3.0 standard drinks of alcohol     Types: 3 Shots of liquor per week     Comment: SOCIAL, 2-3 per week    Drug use: Not Currently     Types: Cannabis     Comment: Cannabis like 1-2X a year max.   Other Topics Concern    Caffeine Concern Yes    Stress Concern No    Weight Concern Yes     Comment: currently loosing weight.    Special Diet No    Exercise Yes     Comment: Not often    Seat Belt Yes     Social Drivers of Health      [facility]   [4]   Family History  Problem Relation Age of Onset    Cancer  Father         GE junction    Alcohol and Other Disorders Associated Father     Colon Cancer Father     Hypertension Father     Lipids Father     Heart Disorder Father     Cancer Maternal Grandmother         breast    Breast Cancer Maternal Grandmother         Grandmother on moms side    Heart Disorder Maternal Grandfather     Cancer Maternal Grandfather     Heart Attack Maternal Grandfather     Heart Attack Paternal Grandmother     Heart Disorder Paternal Grandmother     Cancer Paternal Grandfather     Cancer Paternal Uncle     Lipids Brother    [5]   Allergies  Allergen Reactions    Dust     Grass     Losartan      Lightheaded,sob,muscle weakness    Mold     Penicillins RASH    Reglan [Metoclopramide] OTHER (SEE COMMENTS)     Pt starts to feel hot and flushed and trouble breathing    Seasonal OTHER (SEE COMMENTS)     Sinus congestion   [6]   Current Outpatient Medications   Medication Sig Dispense Refill    MIEBO 1.338 GM/ML Ophthalmic Solution       albuterol 108 (90 Base) MCG/ACT Inhalation Aero Soln INHALE 2 PUFFS 4 TIMES A DAY AS NEEDED FOR SHORTNESS OF BREATH OR FOR WHEEZE (Patient not taking: Reported on 4/18/2025)      Meloxicam 7.5 MG Oral Tab Take 1 tablet (7.5 mg total) by mouth daily. 30 tablet 0    CLONAZEPAM 1 MG Oral Tab TAKE 1 TABLET BY MOUTH NIGHTLY AS NEEDED FOR ANXIETY 30 tablet 2    AMLODIPINE 10 MG Oral Tab TAKE 1 TABLET BY MOUTH EVERY DAY 90 tablet 0    VUMERITY 231 MG Oral Capsule Delayed Release TAKE 2 CAPSULES TWICE A  capsule 0    azelastine 137 MCG/SPRAY Nasal Solution SPRAY 1 SPRAY INTO EACH NOSTRIL TWICE A DAY 30 mL 1    TESTOSTERONE CYPIONATE 200 mg/mL Intramuscular Solution INJECT 1ML INTO THE MUSCLE EVERY 14 DAYS 6 mL 1    LEVOTHYROXINE 88 MCG Oral Tab TAKE 1 TABLET BY MOUTH BEFORE BREAKFAST 90 tablet 0    atorvastatin 40 MG Oral Tab TAKE 1 TABLET BY MOUTH EVERY DAY 90 tablet 1    Triamterene-HCTZ 37.5-25 MG Oral Tab Take 1 tablet by mouth daily. 90 tablet 0    omeprazole 20  MG Oral Capsule Delayed Release Take 1 capsule (20 mg total) by mouth every morning.      Needle, Disp, 18G X 1\" Does not apply Misc Use for testosterone  each 1    Syringe/Needle, Disp, (BD LUER-IDANIA SYRINGE) 25G X 1\" 3 ML Does not apply Misc To inject testosterone IM q 2 weeks 10 each 1    Syringe/Needle, Disp, (BD LUER-LOCK SYRINGE) 18G X 1-1/2\" 3 ML Does not apply Misc 1 injection every 14 days 50 each 0    Syringe/Needle, Disp, (SYRINGE LUER LOCK) 25G X 1-1/2\" 3 ML Does not apply Misc 1 each every 14 (fourteen) days. Pt using terumo luer lock syringes 100 each 0    aspirin 81 MG Oral Tab Take 1 tablet (81 mg total) by mouth daily.      FLUTICASONE PROPIONATE, NASAL, (FLONASE) 50 MCG/ACT Nasal Suspension daily      Fexofenadine HCl (ALLEGRA) 180 MG Oral Tab Take  by mouth daily as needed.     [7]   Patient Active Problem List  Diagnosis    Obesity    Polycythemia    Male hypogonadism    H/O Hashimoto thyroiditis    Hypertension    Special screening for malignant neoplasm of prostate    Hyperlipidemia    Hypothyroidism due to acquired atrophy of thyroid    Acute maxillary sinusitis    FH: gastric cancer    Blurry vision, left eye    Multiple sclerosis (HCC)    Testicular hypofunction    Low testosterone in male    History of adenomatous polyp of colon    Chronic sinusitis    Tubular adenoma    Benign essential hypertension    Benign prostatic hyperplasia    Diverticulosis of colon    Hypothyroidism    Incomplete right bundle branch block    Internal hemorrhoids    Lumbar radiculopathy    Polycythemia vera (HCC)    Impotence of organic origin    Morbid obesity (HCC)    Relapsing remitting multiple sclerosis (HCC)    Well adult health check    Benign neoplasm of transverse colon    Benign neoplasm of sigmoid colon    Disorder of kidney and ureter    Hypogonadism in male    Polycythemia vera (HCC)    Postprocedural state    Incidental finding of severe acute respiratory syndrome coronavirus 2 (SARS-CoV-2)  infection    Screening for malignant neoplasm of prostate    Abdominal pain    Abnormal feces    Chronic prostatitis    Disorder of male genital organ    Inguinal hernia    Low back pain    Diarrhea, unspecified    Heartburn

## 2025-04-21 LAB
C TRACH DNA SPEC QL NAA+PROBE: NEGATIVE
ERYTHROPOIETIN: 32 MIU/ML
N GONORRHOEA DNA SPEC QL NAA+PROBE: NEGATIVE
TRICH VAG NAA: NEGATIVE

## 2025-04-22 DIAGNOSIS — Z23 NEED FOR HEPATITIS B VACCINATION: Primary | ICD-10-CM

## 2025-04-23 DIAGNOSIS — M54.16 CHRONIC RIGHT-SIDED LUMBAR RADICULOPATHY: ICD-10-CM

## 2025-04-23 RX ORDER — MELOXICAM 7.5 MG/1
7.5 TABLET ORAL DAILY
Qty: 30 TABLET | Refills: 0 | Status: SHIPPED | OUTPATIENT
Start: 2025-04-23

## 2025-04-23 NOTE — TELEPHONE ENCOUNTER
Medication: Meloxicam 7.5 MG Oral Tab      Date of last refill: 3/27/25 (#30/0)  [place]      Last office visit: 3/27/25  Due back to clinic per last office note:    Date next office visit scheduled:    Future Appointments   Date Time Provider Department Center   4/28/2025  8:15 AM WDR MRI RM1 (1.5T WIDE) WDR MRI [facility]           Last OV note recommendation:    \"ASSESSMENT:  1. Chronic right-sided lumbar radiculopathy       PLAN:   Start PT  Patient requests MRI lumbar spine given persist symptoms lasting 1 year, recently progressing  Rx for Meloxicam 7.5 mg daily  Advised to take medication with food to minimize GI upset.  Instructed not to take other NSAID's while taking this medication.  F/U after imaging, or sooner for any new, worsening, or concerning  signs or symptoms.\"    Routed to Provider.

## 2025-04-28 ENCOUNTER — PATIENT MESSAGE (OUTPATIENT)
Dept: NEUROLOGY | Facility: CLINIC | Age: 55
End: 2025-04-28

## 2025-04-28 ENCOUNTER — HOSPITAL ENCOUNTER (OUTPATIENT)
Dept: MRI IMAGING | Age: 55
Discharge: HOME | End: 2025-04-28
Payer: COMMERCIAL

## 2025-04-28 DIAGNOSIS — M54.16 CHRONIC RIGHT-SIDED LUMBAR RADICULOPATHY: ICD-10-CM

## 2025-04-28 PROCEDURE — 72148 MRI LUMBAR SPINE W/O DYE: CPT

## 2025-05-01 ENCOUNTER — TELEPHONE (OUTPATIENT)
Dept: SURGERY | Facility: CLINIC | Age: 55
End: 2025-05-01

## 2025-05-01 NOTE — TELEPHONE ENCOUNTER
Patient calling to get MRI results. An appointment was made but he claims he usually get results via [employer]

## 2025-05-01 NOTE — TELEPHONE ENCOUNTER
Message below noted.    Advised patient of message and to reach back out with any other questions or concerns.    Patient is active on MyChart.  message sent.

## 2025-05-01 NOTE — TELEPHONE ENCOUNTER
Message below noted.    Patient requesting MRI results. Patient scheduled appointment for 5/19/25, but claims he usually gets results via [employer].     LOV 3/27/25  \"ASSESSMENT:  1. Chronic right-sided lumbar radiculopathy       PLAN:   Start PT  Patient requests MRI lumbar spine given persist symptoms lasting 1 year, recently progressing  Rx for Meloxicam 7.5 mg daily  Advised to take medication with food to minimize GI upset.  Instructed not to take other NSAID's while taking this medication.  F/U after imaging, or sooner for any new, worsening, or concerning  signs or symptoms.\"    Routed to Provider.

## 2025-05-01 NOTE — TELEPHONE ENCOUNTER
It is preferred that MRI results are reviewed during in-person visit so we can show patient his imaging. He should follow up after completing PT as advised during the office visit.

## 2025-05-05 ENCOUNTER — APPOINTMENT (OUTPATIENT)
Age: 55
End: 2025-05-05
Attending: INTERNAL MEDICINE
Payer: COMMERCIAL

## 2025-05-05 DIAGNOSIS — D75.1 ERYTHROCYTOSIS: ICD-10-CM

## 2025-05-09 ENCOUNTER — NURSE ONLY (OUTPATIENT)
Age: 55
End: 2025-05-09
Attending: INTERNAL MEDICINE
Payer: COMMERCIAL

## 2025-05-09 VITALS
BODY MASS INDEX: 35.67 KG/M2 | WEIGHT: 235.38 LBS | DIASTOLIC BLOOD PRESSURE: 83 MMHG | HEART RATE: 77 BPM | HEIGHT: 67.99 IN | OXYGEN SATURATION: 94 % | RESPIRATION RATE: 16 BRPM | SYSTOLIC BLOOD PRESSURE: 120 MMHG

## 2025-05-09 DIAGNOSIS — D75.1 ERYTHROCYTOSIS: ICD-10-CM

## 2025-05-09 LAB
BASOPHILS # BLD AUTO: 0.04 X10(3) UL (ref 0–0.2)
BASOPHILS NFR BLD AUTO: 0.7 %
EOSINOPHIL # BLD AUTO: 0.12 X10(3) UL (ref 0–0.7)
EOSINOPHIL NFR BLD AUTO: 2.2 %
ERYTHROCYTE [DISTWIDTH] IN BLOOD BY AUTOMATED COUNT: 14.5 %
HCT VFR BLD AUTO: 53.3 % (ref 39–53)
HGB BLD-MCNC: 18.8 G/DL (ref 13–17.5)
IMM GRANULOCYTES # BLD AUTO: 0.02 X10(3) UL (ref 0–1)
IMM GRANULOCYTES NFR BLD: 0.4 %
LYMPHOCYTES # BLD AUTO: 0.43 X10(3) UL (ref 1–4)
LYMPHOCYTES NFR BLD AUTO: 7.8 %
MCH RBC QN AUTO: 30.2 PG (ref 26–34)
MCHC RBC AUTO-ENTMCNC: 35.3 G/DL (ref 31–37)
MCV RBC AUTO: 85.7 FL (ref 80–100)
MONOCYTES # BLD AUTO: 0.52 X10(3) UL (ref 0.1–1)
MONOCYTES NFR BLD AUTO: 9.5 %
NEUTROPHILS # BLD AUTO: 4.35 X10 (3) UL (ref 1.5–7.7)
NEUTROPHILS # BLD AUTO: 4.35 X10(3) UL (ref 1.5–7.7)
NEUTROPHILS NFR BLD AUTO: 79.4 %
PLATELET # BLD AUTO: 163 10(3)UL (ref 150–450)
RBC # BLD AUTO: 6.22 X10(6)UL (ref 4.3–5.7)
WBC # BLD AUTO: 5.5 X10(3) UL (ref 4–11)

## 2025-05-09 NOTE — PROGRESS NOTES
Patient Name: Lionel Velazquez  : 1970   Medical Record #: [id number]      Therapeutic Donor Physical and Record of Collection    Order:  Therapeutic Phlebotomy  Order Date:  12/3/2024   Expiration Date:  12/3/2025  Frequency:  TBD  Parameters:  Therapeutic Phlebotomy for HGB > 18  Ordering Physician:  Ghislaine Dias  Fax Number:        /[phone]   Pulse 77   Resp 16   Ht 1.727 m (5' 7.99\")   Wt 106.8 kg (235 lb 6.4 oz)   SpO2 94%   BMI 35.80 kg/m²       Acceptance Criteria:  Systolic blood pressure   mm/Hg  Diastolic blood pressure   mm/Hg  Pulse   beats per minute  Temperature  96-99.4 degrees Farenheit  Weight  >110 lbs     Hemoglobin:  18.8  Acceptance Criteria:  HGB 11 GM/DL or above (Repeat if 10.5-10.9)    The order for therapeutic phlebotomy and donor history was reviewed.  The donor's physical assessment meets acceptance criteria parameters.  This was completed by Adair MENDOZA CMA.    If the patient does not meet the criteria, please explain below:      Collection Information    Arm used:  left    Lot number of collection bag:  [id number]    Collection bag inspection acceptable:  Yes    Donor signature obtained for consent:  Yes    Time collection started:  15:41    Time collection completed:  15:46    (Discontinue collection at 15 minutes)    Reaction to procedure:  No     If yes, detailed description of reaction:      Weight of unit:  500ml    Patient given post donation instructions:  Yes    Post collection blood pressure:  120/83    Collection completed by:  Adair MENDOZA CMA    Patient discharged:  Ambulatory    Patient accompanied by:  N/A    Patient is stable and was instructed to call the ordering physician with any questions / concerns.        Patient Name: Lionel Velazquez  : 1970   Medical Record #: [id number]      Therapeutic Donor History    Donor History    When was your last healthy meal? 1pm    When was the last time you did any strenuous activities today?  Morning    Are you feeling ill or unhealthy today:  No    Do you currently have a cold, flu, sore throat, chills, respiratory infection, diarrhea, or any other active infections:  No    Have you had a dental procedure in the past 3 days:  No    Are you currently pregnant:  No    Are you allergic to iodine, latex, or chlorhexidine:  No   If yes, specify the type of reaction:      Have you ever had an adverse reaction to a blood donation or been deferred (refused) as a blood donor:  No    Have you had a blood disease, bleeding problem, or recently received blood products in the last 3 months:  No   If yes, please specify:    Have you had epilepsy, convulsions, or frequent fainting spells:  No   If yes, please specify:    History of chronic illness / major illness / surgery:  None of the above   List details on any of the above:    Are you taking pills, medications, or have you received any shots in the last 3 months:  Yes   Comments:    Current Medications[1]    The order for therapeutic phlebotomy and donor history was reviewed.  The donor's physical assessment and history meets acceptance criteria parameters.  This was completed by Adair MENDOZA CMA.    If the patient does not meet the criteria, please explain below:       Pathologist consulted:  No   Pathologist's name:   Outcome / Orders placed:                                 [1]   Current Outpatient Medications   Medication Sig Dispense Refill    MELOXICAM 7.5 MG Oral Tab TAKE 1 TABLET BY MOUTH EVERY DAY 30 tablet 0    MIEBO 1.338 GM/ML Ophthalmic Solution       albuterol 108 (90 Base) MCG/ACT Inhalation Aero Soln INHALE 2 PUFFS 4 TIMES A DAY AS NEEDED FOR SHORTNESS OF BREATH OR FOR WHEEZE (Patient not taking: Reported on 4/18/2025)      CLONAZEPAM 1 MG Oral Tab TAKE 1 TABLET BY MOUTH NIGHTLY AS NEEDED FOR ANXIETY 30 tablet 2    AMLODIPINE 10 MG Oral Tab TAKE 1 TABLET BY MOUTH EVERY DAY 90 tablet 0    VUMERITY 231 MG Oral Capsule Delayed Release TAKE 2 CAPSULES TWICE  A  capsule 0    azelastine 137 MCG/SPRAY Nasal Solution SPRAY 1 SPRAY INTO EACH NOSTRIL TWICE A DAY 30 mL 1    TESTOSTERONE CYPIONATE 200 mg/mL Intramuscular Solution INJECT 1ML INTO THE MUSCLE EVERY 14 DAYS 6 mL 1    LEVOTHYROXINE 88 MCG Oral Tab TAKE 1 TABLET BY MOUTH BEFORE BREAKFAST 90 tablet 0    atorvastatin 40 MG Oral Tab TAKE 1 TABLET BY MOUTH EVERY DAY 90 tablet 1    Triamterene-HCTZ 37.5-25 MG Oral Tab Take 1 tablet by mouth daily. 90 tablet 0    omeprazole 20 MG Oral Capsule Delayed Release Take 1 capsule (20 mg total) by mouth every morning.      Needle, Disp, 18G X 1\" Does not apply Misc Use for testosterone  each 1    Syringe/Needle, Disp, (BD LUER-IDANIA SYRINGE) 25G X 1\" 3 ML Does not apply Misc To inject testosterone IM q 2 weeks 10 each 1    Syringe/Needle, Disp, (BD LUER-LOCK SYRINGE) 18G X 1-1/2\" 3 ML Does not apply Misc 1 injection every 14 days 50 each 0    Syringe/Needle, Disp, (SYRINGE LUER LOCK) 25G X 1-1/2\" 3 ML Does not apply Misc 1 each every 14 (fourteen) days. Pt using terumo luer lock syringes 100 each 0    aspirin 81 MG Oral Tab Take 1 tablet (81 mg total) by mouth daily.      FLUTICASONE PROPIONATE, NASAL, (FLONASE) 50 MCG/ACT Nasal Suspension daily      Fexofenadine HCl (ALLEGRA) 180 MG Oral Tab Take  by mouth daily as needed.

## 2025-05-12 DIAGNOSIS — J30.1 ALLERGIC RHINITIS DUE TO POLLEN: ICD-10-CM

## 2025-05-12 DIAGNOSIS — M54.16 CHRONIC RIGHT-SIDED LUMBAR RADICULOPATHY: ICD-10-CM

## 2025-05-13 RX ORDER — MELOXICAM 7.5 MG/1
7.5 TABLET ORAL DAILY
Qty: 30 TABLET | Refills: 0 | OUTPATIENT
Start: 2025-05-13

## 2025-05-14 ENCOUNTER — APPOINTMENT (OUTPATIENT)
Dept: CT IMAGING | Facility: HOSPITAL | Age: 55
End: 2025-05-14
Attending: STUDENT IN AN ORGANIZED HEALTH CARE EDUCATION/TRAINING PROGRAM
Payer: COMMERCIAL

## 2025-05-14 ENCOUNTER — APPOINTMENT (OUTPATIENT)
Dept: GENERAL RADIOLOGY | Facility: HOSPITAL | Age: 55
End: 2025-05-14
Attending: STUDENT IN AN ORGANIZED HEALTH CARE EDUCATION/TRAINING PROGRAM
Payer: COMMERCIAL

## 2025-05-14 ENCOUNTER — HOSPITAL ENCOUNTER (EMERGENCY)
Facility: HOSPITAL | Age: 55
Discharge: HOME | End: 2025-05-14
Attending: STUDENT IN AN ORGANIZED HEALTH CARE EDUCATION/TRAINING PROGRAM
Payer: COMMERCIAL

## 2025-05-14 VITALS
OXYGEN SATURATION: 95 % | DIASTOLIC BLOOD PRESSURE: 80 MMHG | RESPIRATION RATE: 13 BRPM | WEIGHT: 235.44 LBS | SYSTOLIC BLOOD PRESSURE: 127 MMHG | BODY MASS INDEX: 36.95 KG/M2 | HEIGHT: 67 IN | TEMPERATURE: 98 F | HEART RATE: 72 BPM

## 2025-05-14 DIAGNOSIS — R10.13 EPIGASTRIC PAIN: Primary | ICD-10-CM

## 2025-05-14 LAB
ALBUMIN SERPL-MCNC: 4.5 G/DL (ref 3.2–4.8)
ALBUMIN/GLOB SERPL: 2.1 {RATIO} (ref 1–2)
ALP LIVER SERPL-CCNC: 60 U/L (ref 45–117)
ALT SERPL-CCNC: 68 U/L (ref 10–49)
ANION GAP SERPL CALC-SCNC: 7 MMOL/L (ref 0–18)
AST SERPL-CCNC: 99 U/L (ref ?–34)
ATRIAL RATE: 80 BPM
BASOPHILS # BLD AUTO: 0.04 X10(3) UL (ref 0–0.2)
BASOPHILS NFR BLD AUTO: 0.8 %
BILIRUB SERPL-MCNC: 0.5 MG/DL (ref 0.3–1.2)
BUN BLD-MCNC: 24 MG/DL (ref 9–23)
CALCIUM BLD-MCNC: 9.8 MG/DL (ref 8.7–10.6)
CHLORIDE SERPL-SCNC: 109 MMOL/L (ref 98–112)
CO2 SERPL-SCNC: 29 MMOL/L (ref 21–32)
CREAT BLD-MCNC: 1.28 MG/DL (ref 0.7–1.3)
D DIMER PPP FEU-MCNC: <0.27 UG/ML FEU (ref ?–0.56)
EGFRCR SERPLBLD CKD-EPI 2021: 66 ML/MIN/1.73M2 (ref 60–?)
EOSINOPHIL # BLD AUTO: 0.12 X10(3) UL (ref 0–0.7)
EOSINOPHIL NFR BLD AUTO: 2.4 %
ERYTHROCYTE [DISTWIDTH] IN BLOOD BY AUTOMATED COUNT: 14.6 %
GLOBULIN PLAS-MCNC: 2.1 G/DL (ref 2–3.5)
GLUCOSE BLD-MCNC: 102 MG/DL (ref 70–99)
HCT VFR BLD AUTO: 50.2 % (ref 39–53)
HGB BLD-MCNC: 16.9 G/DL (ref 13–17.5)
IMM GRANULOCYTES # BLD AUTO: 0.02 X10(3) UL (ref 0–1)
IMM GRANULOCYTES NFR BLD: 0.4 %
LIPASE SERPL-CCNC: 44 U/L (ref 12–53)
LYMPHOCYTES # BLD AUTO: 0.36 X10(3) UL (ref 1–4)
LYMPHOCYTES NFR BLD AUTO: 7.3 %
MCH RBC QN AUTO: 29.5 PG (ref 26–34)
MCHC RBC AUTO-ENTMCNC: 33.7 G/DL (ref 31–37)
MCV RBC AUTO: 87.8 FL (ref 80–100)
MONOCYTES # BLD AUTO: 0.6 X10(3) UL (ref 0.1–1)
MONOCYTES NFR BLD AUTO: 12.1 %
NEUTROPHILS # BLD AUTO: 3.8 X10 (3) UL (ref 1.5–7.7)
NEUTROPHILS # BLD AUTO: 3.8 X10(3) UL (ref 1.5–7.7)
NEUTROPHILS NFR BLD AUTO: 77 %
OSMOLALITY SERPL CALC.SUM OF ELEC: 304 MOSM/KG (ref 275–295)
P AXIS: 18 DEGREES
P-R INTERVAL: 172 MS
PLATELET # BLD AUTO: 154 10(3)UL (ref 150–450)
POTASSIUM SERPL-SCNC: 4.3 MMOL/L (ref 3.5–5.1)
PROT SERPL-MCNC: 6.6 G/DL (ref 5.7–8.2)
Q-T INTERVAL: 364 MS
QRS DURATION: 100 MS
QTC CALCULATION (BEZET): 419 MS
R AXIS: -27 DEGREES
RBC # BLD AUTO: 5.72 X10(6)UL (ref 4.3–5.7)
SODIUM SERPL-SCNC: 145 MMOL/L (ref 136–145)
T AXIS: 10 DEGREES
TROPONIN I SERPL HS-MCNC: 3 NG/L (ref ?–53)
TROPONIN I SERPL HS-MCNC: 3 NG/L (ref ?–53)
VENTRICULAR RATE: 80 BPM
WBC # BLD AUTO: 4.9 X10(3) UL (ref 4–11)

## 2025-05-14 PROCEDURE — 99285 EMERGENCY DEPT VISIT HI MDM: CPT

## 2025-05-14 PROCEDURE — 84484 ASSAY OF TROPONIN QUANT: CPT | Performed by: STUDENT IN AN ORGANIZED HEALTH CARE EDUCATION/TRAINING PROGRAM

## 2025-05-14 PROCEDURE — 85379 FIBRIN DEGRADATION QUANT: CPT | Performed by: STUDENT IN AN ORGANIZED HEALTH CARE EDUCATION/TRAINING PROGRAM

## 2025-05-14 PROCEDURE — 80053 COMPREHEN METABOLIC PANEL: CPT | Performed by: STUDENT IN AN ORGANIZED HEALTH CARE EDUCATION/TRAINING PROGRAM

## 2025-05-14 PROCEDURE — 83690 ASSAY OF LIPASE: CPT | Performed by: STUDENT IN AN ORGANIZED HEALTH CARE EDUCATION/TRAINING PROGRAM

## 2025-05-14 PROCEDURE — 71045 X-RAY EXAM CHEST 1 VIEW: CPT | Performed by: STUDENT IN AN ORGANIZED HEALTH CARE EDUCATION/TRAINING PROGRAM

## 2025-05-14 PROCEDURE — 96375 TX/PRO/DX INJ NEW DRUG ADDON: CPT

## 2025-05-14 PROCEDURE — 85025 COMPLETE CBC W/AUTO DIFF WBC: CPT | Performed by: STUDENT IN AN ORGANIZED HEALTH CARE EDUCATION/TRAINING PROGRAM

## 2025-05-14 PROCEDURE — 93005 ELECTROCARDIOGRAM TRACING: CPT

## 2025-05-14 PROCEDURE — 93010 ELECTROCARDIOGRAM REPORT: CPT

## 2025-05-14 PROCEDURE — 74177 CT ABD & PELVIS W/CONTRAST: CPT | Performed by: STUDENT IN AN ORGANIZED HEALTH CARE EDUCATION/TRAINING PROGRAM

## 2025-05-14 PROCEDURE — 96374 THER/PROPH/DIAG INJ IV PUSH: CPT

## 2025-05-14 RX ORDER — AZELASTINE HYDROCHLORIDE 137 UG/1
1 SPRAY, METERED NASAL 2 TIMES DAILY
Qty: 1 EACH | Refills: 1 | Status: SHIPPED | OUTPATIENT
Start: 2025-05-14

## 2025-05-14 RX ORDER — LEVOTHYROXINE SODIUM 88 UG/1
88 TABLET ORAL
Qty: 90 TABLET | Refills: 1 | Status: SHIPPED | OUTPATIENT
Start: 2025-05-14

## 2025-05-14 RX ORDER — MORPHINE SULFATE 4 MG/ML
4 INJECTION, SOLUTION INTRAMUSCULAR; INTRAVENOUS ONCE
Status: COMPLETED | OUTPATIENT
Start: 2025-05-14 | End: 2025-05-14

## 2025-05-14 RX ORDER — FAMOTIDINE 10 MG/ML
20 INJECTION, SOLUTION INTRAVENOUS ONCE
Status: COMPLETED | OUTPATIENT
Start: 2025-05-14 | End: 2025-05-14

## 2025-05-14 NOTE — ED QUICK NOTES
Patient has pain in epigastric area that radiates around to both sides. Started 1 hour PTA. Tried taking Tums, did burp but didn't get any relief. Hx of MS, and has a \"MS hug\" that felt similar to this. Painful to take a deep breath. Pain with movement.

## 2025-05-14 NOTE — ED QUICK NOTES
2 hour troponin drawn and sent.   Patient anxious about \"atelectasis\" found on his CT and Chest xray.

## 2025-05-14 NOTE — ED QUICK NOTES
Patient viewed his results on MyChart. Is concerned about his elevated liver enzymes. States he had 1 alcohol drink last night. \"My AST and ALT are off the charts.\"

## 2025-05-14 NOTE — ED INITIAL ASSESSMENT (HPI)
Epigastric pain, cramping pain, that is painful to take a deep breath. Started about 1 hour PTA. Tried taking Tums without relief. Hx of MS.

## 2025-05-16 NOTE — ED PROVIDER NOTES
Patient Seen in: [facility] Emergency Department      History     Chief Complaint   Patient presents with    Epigastric Pain     Stated Complaint: epigastric pain X 40 minutes    Subjective:   HPI  History of Present Illness            The patient presents to the ED with complaints of epigastric pain that began prior to arrival. He notes it was intense and radiated upwards along with into his back. He has also notes associated sob. Pain currently 7/10.       Objective:     Past Medical History:    Abdominal hernia    Abdominal pain    After having a few drinks    Anxiety    Arthritis    Back pain    Lower right side    Bloating    Body piercing    left ear    Chronic rhinitis    Diarrhea, unspecified    After having a couple of drinks. Lasts about 3 days    Heartburn    High cholesterol    Hyperlipidemia    HYPOTHYROIDISM    Hypothyroidism    hashimoto's    Multiple sclerosis (HCC)    Obesity    Fat but loosing weight    Other abnormal glucose    Loss of peripheral vision    OTHER DISEASES    hypogonadism    Pain in joints    Shingles    [place]    Sleep apnea    Sleep disturbance    Thyroid disease    Unspecified essential hypertension    Wears glasses    Weight gain              Past Surgical History:   Procedure Laterality Date    Arthroscopy of joint unlisted      yoav shoulder    Colonoscopy      Hernia surgery  2007,11/2009    x3    Other surgical history  2008    SINUS x2                Social History     Socioeconomic History    Marital status:    Tobacco Use    Smoking status: Former     Current packs/day: 0.00     Average packs/day: 1 pack/day for 20.0 years (20.0 ttl pk-yrs)     Types: Cigarettes     Start date: 1980     Quit date: 2000     Years since quittin.3    Smokeless tobacco: Never   Vaping Use    Vaping status: Never Used   Substance and Sexual Activity    Alcohol use: Yes     Alcohol/week: 3.0 standard drinks of alcohol     Types: 3 Shots of liquor per week      Comment: SOCIAL, 2-3 per week    Drug use: Not Currently     Types: Cannabis     Comment: Cannabis like 1-2X a year max.   Other Topics Concern    Caffeine Concern Yes    Stress Concern No    Weight Concern Yes     Comment: currently loosing weight.    Special Diet No    Exercise Yes     Comment: Not often    Seat Belt Yes     Social Drivers of Health      [facility]                                Physical Exam     ED Triage Vitals   BP 05/14/25 1102 140/89   Pulse 05/14/25 1102 79   Resp 05/14/25 1102 13   Temp 05/14/25 1107 98 °F (36.7 °C)   Temp src 05/14/25 1107 Oral   SpO2 05/14/25 1102 98 %   O2 Device 05/14/25 1102 None (Room air)       Current Vitals:   Vital Signs  BP: 127/80  Pulse: 72  Resp: 13  Temp: 98 °F (36.7 °C)  Temp src: Oral  MAP (mmHg): 94    Oxygen Therapy  SpO2: 95 %  O2 Device: None (Room air)          Physical Exam  Vitals and nursing note reviewed.   Constitutional:       General: He is not in acute distress.     Appearance: Normal appearance.   HENT:      Head: Normocephalic.      Nose: Nose normal.      Mouth/Throat:      Mouth: Mucous membranes are moist.   Eyes:      Extraocular Movements: Extraocular movements intact.      Pupils: Pupils are equal, round, and reactive to light.   Cardiovascular:      Rate and Rhythm: Normal rate and regular rhythm.      Pulses: Normal pulses.      Heart sounds: Normal heart sounds.   Pulmonary:      Effort: Pulmonary effort is normal.      Breath sounds: Normal breath sounds.   Abdominal:      General: Abdomen is flat. Bowel sounds are normal. There is no distension.      Palpations: Abdomen is soft.      Tenderness: There is abdominal tenderness. There is no right CVA tenderness, left CVA tenderness, guarding or rebound.      Hernia: No hernia is present.   Musculoskeletal:         General: No swelling or tenderness. Normal range of motion.      Cervical back: Normal range of motion.   Skin:     General: Skin is warm  and dry.   Neurological:      Mental Status: He is alert and oriented to person, place, and time. Mental status is at baseline.   Psychiatric:         Mood and Affect: Mood normal.                 ED Course     Labs Reviewed   CBC WITH DIFFERENTIAL WITH PLATELET - Abnormal; Notable for the following components:       Result Value    RBC 5.72 (*)     Lymphocyte Absolute 0.36 (*)     All other components within normal limits   COMP METABOLIC PANEL (14) - Abnormal; Notable for the following components:    Glucose 102 (*)     BUN 24 (*)     Calculated Osmolality 304 (*)     AST 99 (*)     ALT 68 (*)     A/G Ratio 2.1 (*)     All other components within normal limits   TROPONIN I HIGH SENSITIVITY - Normal   LIPASE - Normal   D-DIMER - Normal   TROPONIN I HIGH SENSITIVITY - Normal   RAINBOW DRAW LAVENDER   RAINBOW DRAW LIGHT GREEN   RAINBOW DRAW BLUE     EKG    Rate, intervals and axes as noted on EKG Report.  Rate: 80  Rhythm: Sinus Rhythm  Reading: no rajinder/dep              Results            CT ABDOMEN+PELVIS(CONTRAST ONLY)(CPT=74177)  Result Date: 5/14/2025  CONCLUSION:  No acute intra-abdominal/pelvic process identified.  Please see above for further details.  LOCATION:  [id number]   Dictated by (CST): Jose Enrique Ahmadi MD on 5/14/2025 at 12:14 PM     Finalized by (CST): Jose Enrique Ahmadi MD on 5/14/2025 at 12:17 PM       XR CHEST AP PORTABLE  (CPT=71045)  Result Date: 5/14/2025  CONCLUSION:  Mild scarring/atelectasis in the lower lungs.  Possible small left pleural effusion.   LOCATION:  [id number]      Dictated by (CST): Jose Enrique Ahmadi MD on 5/14/2025 at 11:34 AM     Finalized by (CST): Jose Enrique Ahmadi MD on 5/14/2025 at 11:35 AM                          MDM              Medical Decision Making    The differential includes the following  Acute MI which is a life threat, pancreatitis, choledocholithiasis cholecystitis    Pertinent comorbidities include  As detailed above    Pertinent social history includes  As detailed  above    Labs  2 serial troponins negative  BUN 24, creatinine 1.28  AST 99 ALT 68 lipase 44 T. bili is 0.5    Imaging studies  I reviewed the images and my independent interpreation after review is no focal consolidation concerning for pneumonia. Additionaly, I reviewd the radiology report that states the following no acute process identified on CT    External data reviewed    Discussion of management with external providers    ER course  The patient is hemodynamically stable in no acute distress.  His labs showed mild elevated LFTs.  Serial troponins are negative.  Patient was given morphine as well as Pepcid.  Symptoms have shown some improvement.  I discussed all the patient's labs with him and his wife.  They believe this could be related to his MS he will be following up with his neurologist.  At this time he feels comfortable being discharged home.  Discussed diet changes.  Disposition and Plan     Clinical Impression:  1. Epigastric pain         Disposition:  Discharge  5/14/2025  2:01 pm    Follow-up:  Paul De Leon DO  [address] [place] [address]  [facility] [address]  [phone]    Follow up      Paul Lincoln DO  [address] [id number] [phone]    Follow up            Medications Prescribed:  Discharge Medication List as of 5/14/2025  2:02 PM        START taking these medications    Details   LEVOTHYROXINE 88 MCG Oral Tab Take 1 tablet (88 mcg total) by mouth before breakfast., Normal, Disp-90 tablet, R-1, ANGELIC      azelastine 137 MCG/SPRAY Nasal Solution USE 1 SPRAY INTO EACH NOSTRIL TWICE A DAY, Normal, Disp-1 each, R-1                   Supplementary Documentation:

## 2025-05-17 ENCOUNTER — PATIENT MESSAGE (OUTPATIENT)
Dept: NEUROLOGY | Facility: CLINIC | Age: 55
End: 2025-05-17

## 2025-05-17 DIAGNOSIS — G35 MULTIPLE SCLEROSIS (HCC): ICD-10-CM

## 2025-05-19 ENCOUNTER — OFFICE VISIT (OUTPATIENT)
Dept: SURGERY | Facility: CLINIC | Age: 55
End: 2025-05-19
Payer: COMMERCIAL

## 2025-05-19 VITALS
HEART RATE: 74 BPM | DIASTOLIC BLOOD PRESSURE: 80 MMHG | SYSTOLIC BLOOD PRESSURE: 128 MMHG | OXYGEN SATURATION: 95 % | WEIGHT: 226 LBS | HEIGHT: 68 IN | BODY MASS INDEX: 34.25 KG/M2

## 2025-05-19 DIAGNOSIS — M48.061 DEGENERATIVE LUMBAR SPINAL STENOSIS: ICD-10-CM

## 2025-05-19 DIAGNOSIS — M54.16 CHRONIC RIGHT-SIDED LUMBAR RADICULOPATHY: Primary | ICD-10-CM

## 2025-05-19 PROCEDURE — 99215 OFFICE O/P EST HI 40 MIN: CPT

## 2025-05-19 RX ORDER — DIROXIMEL FUMARATE 231 MG/1
2 CAPSULE ORAL 2 TIMES DAILY
Qty: 360 CAPSULE | Refills: 0 | Status: SHIPPED | OUTPATIENT
Start: 2025-05-19

## 2025-05-19 NOTE — PROGRESS NOTES
Neurosurgery Clinic Visit  Follow Up  May 19, 2025    Lionel Velazquez PCP:  ESTELLE REA,     1970 MRN [id number]     Reason for Visit: Chronic right lumbar radiculopathy     HPI     Lionel Velazquez is a pleasant 54 year old male who returns for imaging review. Since his last visit, Lionel states symptoms are unchanged. He endorses bilateral LBP, R > L, radiating to the R groin and R anterior thigh. Pain worsens as the day goes on. He is only able to stand for 10 minutes before needing to sit and rest due to severe back and leg pain. He reports \"tightness\" around the toes of the R foot and feels as though something is \"bunched up\" under the ball of the R foot.\" He initially attributed this to tight-fitting socks and shoes, but has changed out his shoes and socks multiple times to no improvement. He has tried to remain active (exercises, works as a DJ on weekends, works remotely during the week) but states his pain has slowed him down significantly.  He had no relief with Meloxicam. He was unable to start PT due to financial constraints.     MRI lumbar spine shows severe central canal stenosis and B/L neural foraminal stenosis at L2-3, L3-4 and L4-5.    Of note, patient presented to ED 5 days ago with c/o chest tightness and SOB. He thought he was having a heart attack. This has been his 3rd episode in the last 3 years. Cardiac work-up in ED was negative and he was told that his symptoms could be related to MS, thus was advised to f/u with his neurologist. He is scheduled to see Dr. Lincoln tomorrow.     Prior Hx 3/27/25:  Lionel Velazquez is a very pleasant 54 year old male with PMH of MS, Hashimoto's thyroiditis, HTN, HLD, diverticulosis who presents for Neurosurgical consultation for chronic lumbar radiculopathy.     Patient reports atraumatic onset of symptoms 1 year ago, progressively worsening.  He endorses right-sided LBP radiating to the R groin and down the anterior and posterior R thigh,  stopping at the knee.  He reports slight subjective RLE weakness, but admits is difficult to distinguish between baseline weakness secondary to MS.  Pain is aggravated by prolonged standing and weight lifting.  He has had to reduce the number of days per week he works out, as this was exacerbating his pain.  Pain does not vary based on time of day.  He denies numbness or tingling of UE or LE.  Denies neck pain. Denies changes in bowel/bladder habits or saddle anesthesia.  He is ambulatory without assistive device. At times has an unsteady gait secondary to MS. He saw his PCP 2 weeks ago and was prescribed a prednisone taper, which provided excellent relief.  XR lumbar spine shows multilevel DDD and DJD with mild levoscoliosis.     No prior PT or pain services.  No hx of spine surgeries.     HISTORY     Past Medical History[1]   Past Surgical History[2]   Family History[3]   Social Hx on file[4]   Allergies[5]     CURRENT MEDICATIONS     Current Medications[6]     REVIEW OF SYSTEMS     Comprehensive review of systems done. Negative except what is outlined in the above HPI.     PHYSICAL EXAMIMATION     VITALS:  height is 68\" and weight is 226 lb (102.5 kg). His blood pressure is 128/80 and his pulse is 74. His oxygen saturation is 95%.     GENERAL: No apparent distress, non-toxic appearing. Sitting comfortably in the examination chair.     MUSCULOSKELETAL: Even tone. Moves bilateral upper and lower extremities spontaneously and against resistance.    Physical Exam  Musculoskeletal:        Legs:      SKIN: Warm, dry.     NEURO: Alert and oriented x3.  Face is symmetric.       SPINE:  Gait/Coordination:  Gait intact, non-antalgic.   Reports equal strength with toe and heel balance bilaterally.    Sensation:   Sensation to light touch intact to bilateral upper and lower extremities.    ROM:   Lumbar Flexion   +Pain   Lumbar Extension +Pain   Lumbar Rotation  Pain free    LOWER EXTREMITY STRENGTH:    Iliospoas  Hamstrings   Quads  D-flexion  P-flexion EHL     Right 5 5 5 5 5 5     Left 5 5 5 5 5 5     UPPER EXTREMITY STRENGTH:    Deltoid  Biceps  Triceps  Wrist   Flexion  Wrist Extension    Finger abduction     Right 5 5 5 5  5 5 5     Left 5 5 5 5 5 5 5     DTRs:     Biceps    Triceps   Brachioradialis     Patellar     Ankle     Right       2+         2+            2+         2+        2+     Left       2+         2+             2+         2+        2+        IMAGING:     MRI SPINE LUMBAR (CPT=72148)  Result Date: 4/28/2025  CONCLUSION:    1. For the purposes of this dictation, there are 6 lumbar type vertebral bodies.  Please see the numbering on PACS for further details.   2. There are moderate degenerative changes in the lumbar spine superimposed on a developmentally slender lumbar spinal canal.  These degenerative findings are most notable at L4-5.  Please see above for further details.   3. There is mild T2/STIR hyperintense signal within the lower right paraspinal musculature at the L4, L5, and S1 levels.  This may represent muscular strain among other etiologies.   Dictated by (CST): Stromberg, LeRoy, MD on 4/28/2025 at 10:13 AM       Finalized by (CST): Stromberg, LeRoy, MD on 4/28/2025 at 10:27 AM       MEDICAL DECISION MAKING:     ASSESSMENT:  1. Chronic right-sided lumbar radiculopathy    2. Degenerative lumbar spinal stenosis      Reviewed MRI lumbar spine with the patient at length and with Dr. Hernandez.  Patient reports progressive right sided LBP and RLE radiculopathy, now limiting his mobility and overall quality of life. Imaging demonstrates causative pathology with which to correlate patient's symptoms. Recommend surgical intervention, however patient must first attempt PT prior to scheduling.     PLAN:   Start PT - active order present in patient's chart.   Dr. Hernandez recommends L2-3, L3-4 LLIF and L4-5 TLIF.  F/u with Dr. Hernandez after PT for surgical discussion and scheduling.    I reviewed the plan of care with the  patient at length. All questions and concerns were addressed at this time. The patient verbalized understanding, agrees with the plan, and was appreciative.    Total visit time: 50 minutes; More than 50% spent coordinating care, counseling, reviewing imaging and discussing medication therapy.     Malu Rendon, MSN, APRN, FNP-[facility]  Neurosurgery   [address], [address]  [place] [address]  [phone]          [1]   Past Medical History:   Abdominal hernia    Abdominal pain    After having a few drinks    Anxiety    Arthritis    Back pain    Lower right side    Bloating    Body piercing    left ear    Chronic rhinitis    Diarrhea, unspecified    After having a couple of drinks. Lasts about 3 days    Heartburn    High cholesterol    Hyperlipidemia    HYPOTHYROIDISM    Hypothyroidism    hashimoto's    Multiple sclerosis (HCC)    Obesity    Fat but loosing weight    Other abnormal glucose    Loss of peripheral vision    OTHER DISEASES    hypogonadism    Pain in joints    Shingles    [place]    Sleep apnea    Sleep disturbance    Thyroid disease    Unspecified essential hypertension    Wears glasses    Weight gain   [2]   Past Surgical History:  Procedure Laterality Date    Arthroscopy of joint unlisted      yoav shoulder    Colonoscopy      Hernia surgery  8/2007,11/2009    x3    Other surgical history  09/2008    SINUS x2   [3]   Family History  Problem Relation Age of Onset    Cancer Father         GE junction    Alcohol and Other Disorders Associated Father     Colon Cancer Father     Hypertension Father     Lipids Father     Heart Disorder Father     Cancer Maternal Grandmother         breast    Breast Cancer Maternal Grandmother         Grandmother on moms side    Heart Disorder Maternal Grandfather     Cancer Maternal Grandfather     Heart Attack Maternal Grandfather     Heart Attack Paternal Grandmother     Heart Disorder Paternal Grandmother     Cancer Paternal  Grandfather     Cancer Paternal Uncle     Lipids Brother    [4]   Social History  Socioeconomic History    Marital status:    Tobacco Use    Smoking status: Former     Current packs/day: 0.00     Average packs/day: 1 pack/day for 20.0 years (20.0 ttl pk-yrs)     Types: Cigarettes     Start date: 1980     Quit date: 2000     Years since quittin.3    Smokeless tobacco: Never   Vaping Use    Vaping status: Never Used   Substance and Sexual Activity    Alcohol use: Yes     Alcohol/week: 3.0 standard drinks of alcohol     Types: 3 Shots of liquor per week     Comment: SOCIAL, 2-3 per week    Drug use: Not Currently     Types: Cannabis     Comment: Cannabis like 1-2X a year max.   Other Topics Concern    Caffeine Concern Yes    Stress Concern No    Weight Concern Yes     Comment: currently loosing weight.    Special Diet No    Exercise Yes     Comment: Not often    Seat Belt Yes   [5]   Allergies  Allergen Reactions    Dust     Grass     Losartan      Lightheaded,sob,muscle weakness    Mold     Penicillins RASH    Reglan [Metoclopramide] OTHER (SEE COMMENTS)     Pt starts to feel hot and flushed and trouble breathing    Seasonal OTHER (SEE COMMENTS)     Sinus congestion   [6]   Current Outpatient Medications   Medication Sig Dispense Refill    VUMERITY 231 MG Oral Capsule Delayed Release TAKE 2 CAPSULES TWICE A  capsule 0    LEVOTHYROXINE 88 MCG Oral Tab Take 1 tablet (88 mcg total) by mouth before breakfast. 90 tablet 1    azelastine 137 MCG/SPRAY Nasal Solution USE 1 SPRAY INTO EACH NOSTRIL TWICE A DAY 1 each 1    MELOXICAM 7.5 MG Oral Tab TAKE 1 TABLET BY MOUTH EVERY DAY 30 tablet 0    MIEBO 1.338 GM/ML Ophthalmic Solution       CLONAZEPAM 1 MG Oral Tab TAKE 1 TABLET BY MOUTH NIGHTLY AS NEEDED FOR ANXIETY 30 tablet 2    AMLODIPINE 10 MG Oral Tab TAKE 1 TABLET BY MOUTH EVERY DAY 90 tablet 0    TESTOSTERONE CYPIONATE 200 mg/mL Intramuscular Solution INJECT 1ML INTO THE MUSCLE EVERY 14 DAYS 6  mL 1    atorvastatin 40 MG Oral Tab TAKE 1 TABLET BY MOUTH EVERY DAY 90 tablet 1    Triamterene-HCTZ 37.5-25 MG Oral Tab Take 1 tablet by mouth daily. 90 tablet 0    omeprazole 20 MG Oral Capsule Delayed Release Take 1 capsule (20 mg total) by mouth every morning.      Needle, Disp, 18G X 1\" Does not apply Misc Use for testosterone  each 1    Syringe/Needle, Disp, (BD LUER-IDANIA SYRINGE) 25G X 1\" 3 ML Does not apply Misc To inject testosterone IM q 2 weeks 10 each 1    Syringe/Needle, Disp, (BD LUER-LOCK SYRINGE) 18G X 1-1/2\" 3 ML Does not apply Misc 1 injection every 14 days 50 each 0    Syringe/Needle, Disp, (SYRINGE LUER LOCK) 25G X 1-1/2\" 3 ML Does not apply Misc 1 each every 14 (fourteen) days. Pt using terumo luer lock syringes 100 each 0    aspirin 81 MG Oral Tab Take 1 tablet (81 mg total) by mouth daily.      FLUTICASONE PROPIONATE, NASAL, (FLONASE) 50 MCG/ACT Nasal Suspension daily      Fexofenadine HCl (ALLEGRA) 180 MG Oral Tab Take  by mouth daily as needed.      albuterol 108 (90 Base) MCG/ACT Inhalation Aero Soln INHALE 2 PUFFS 4 TIMES A DAY AS NEEDED FOR SHORTNESS OF BREATH OR FOR WHEEZE (Patient not taking: Reported on 5/19/2025)

## 2025-05-19 NOTE — TELEPHONE ENCOUNTER
Medication: VUMERITY 231 MG Oral Capsule Delayed Release      Date of last refill: 02/21/2025 (#360/0)  [place]     Last office visit: 04/23/2024  Due back to clinic per last office note:  6 Months   Date next office visit scheduled:    Future Appointments   Date Time Provider Department Center   5/19/2025  2:00 PM Malu Rendon APRN [facility]   5/20/2025  9:00 AM Paul Lincoln DO [id number]           Last OV note recommendation:       Assessment:  This is a 54 y/o male with multiple sclerosis. Continue Vumerity.      Plan:  1. Multiple Sclerosis  - continue Vumerity  - LP with >10 oligoclonal bands and MRI with evidence of demyelinating disease  - LFTs are stable    - RTC in 6 months  - repeat MRI brain and orbits did not show any new lesions           Paul Lincoln DO  Neurology

## 2025-05-19 NOTE — PATIENT INSTRUCTIONS
Refill policies:    Allow 2-3 business days for refills; controlled substances may take longer.  Contact your pharmacy at least 5 days prior to running out of medication and have them send an electronic request or submit request through the “request refill” option in your [employer] account.  Refills are not addressed on weekends; covering physicians do not authorize routine medications on weekends.  No narcotics or controlled substances are refilled after noon on Fridays or by on call physicians.  By law, narcotics must be electronically prescribed.  A 30 day supply with no refills is the maximum allowed.  If your prescription is due for a refill, you may be due for a follow up appointment.  To best provide you care, patients receiving routine medications need to be seen at least once a year.  Patients receiving narcotic/controlled substance medications need to be seen at least once every 3 months.  In the event that your preferred pharmacy does not have the requested medication in stock (e.g. Backordered), it is your responsibility to find another pharmacy that has the requested medication available.  We will gladly send a new prescription to that pharmacy at your request.    Scheduling Tests:    If your physician has ordered radiology tests such as MRI or CT scans, please contact Central Scheduling at [phone] right away to schedule the test.  Once scheduled, the [facility] Centralized Referral Team will work with your insurance carrier to obtain pre-certification or prior authorization.  Depending on your insurance carrier, approval may take 3-10 days.  It is highly recommended patients assure they have received an authorization before having a test performed.  If test is done without insurance authorization, patient may be responsible for the entire amount billed.      Precertification and Prior Authorizations:  If your physician has recommended that you have a procedure or additional testing performed the [facility]  Centralized Referral Team will contact your insurance carrier to obtain pre-certification or prior authorization.    You are strongly encouraged to contact your insurance carrier to verify that your procedure/test has been approved and is a COVERED benefit.  Although the [facility] Centralized Referral Team does its due diligence, the insurance carrier gives the disclaimer that \"Although the procedure is authorized, this does not guarantee payment.\"    Ultimately the patient is responsible for payment.   Thank you for your understanding in this matter.  Paperwork Completion:  If you require FMLA or disability paperwork for your recovery, please make sure to either drop it off or have it faxed to our office at [phone]. Be sure the form has your name and date of birth on it.  The form will be faxed to our Forms Department and they will complete it for you.  There is a 25$ fee for all forms that need to be filled out.  Please be aware there is a 10-14 day turnaround time.  You will need to sign a release of information (MARLIN) form if your paperwork does not come with one.  You may call the Forms Department with any questions at [phone].  Their fax number is [phone].

## 2025-05-20 ENCOUNTER — PATIENT MESSAGE (OUTPATIENT)
Dept: SURGERY | Facility: CLINIC | Age: 55
End: 2025-05-20

## 2025-05-20 ENCOUNTER — OFFICE VISIT (OUTPATIENT)
Dept: NEUROLOGY | Facility: CLINIC | Age: 55
End: 2025-05-20
Payer: COMMERCIAL

## 2025-05-20 VITALS
WEIGHT: 230 LBS | BODY MASS INDEX: 35 KG/M2 | SYSTOLIC BLOOD PRESSURE: 128 MMHG | RESPIRATION RATE: 16 BRPM | HEART RATE: 80 BPM | DIASTOLIC BLOOD PRESSURE: 78 MMHG

## 2025-05-20 DIAGNOSIS — H53.8 BLURRY VISION, LEFT EYE: ICD-10-CM

## 2025-05-20 DIAGNOSIS — M54.16 LUMBAR RADICULOPATHY: ICD-10-CM

## 2025-05-20 DIAGNOSIS — G35 RELAPSING REMITTING MULTIPLE SCLEROSIS (HCC): Primary | ICD-10-CM

## 2025-05-20 PROCEDURE — 99214 OFFICE O/P EST MOD 30 MIN: CPT | Performed by: OTHER

## 2025-05-20 NOTE — PROGRESS NOTES
Pt reports he feels tightness around chest, went to ER.  Notes cramping and SOB around chest, 3rd time in 3 years.   Pt reports decline in vision.           The following individual(s) verbally consented to be recorded using ambient AI listening technology and understand that they can each withdraw their consent to this listening technology at any point by asking the clinician to turn off or pause the recording:    Patient name: Lionel Irvingler

## 2025-05-20 NOTE — TELEPHONE ENCOUNTER
Message received from patient.    Patient states PT is scheduled for 6/13 and 6/24. Patient is requesting surgery be submitted after 6/24 PT.    Patient states it would need to wait until the end of September or beginning of October since he has weddings scheduled.    Patient requesting name of two surgeries discussed at LOV.    Advised patient he will be scheduled for surgery at follow-up and we cannot submit without a date or an order. Dr. Hernandez will need to discuss with patient first as he has not seen him yet.    Advised patient of surgeries and that Dr. Hernandez will explain again at appointment.

## 2025-05-20 NOTE — PATIENT INSTRUCTIONS
After your visit at the [facility] today,  please direct any follow up questions or medication needs to the staff in our [place] office so that your concerns may be promptly addressed.  We are available through [employer] or at the numbers below:    The phone number is:   [phone] option #1    The fax number is:  [phone]    Your pharmacy should also send any requests electronically to the [place] office.  Refill policies:    Allow 2-3 business days for refills; controlled substances may take longer.  Contact your pharmacy at least 5 days prior to running out of medication and have them send an electronic request or submit request through the “request refill” option in your [employer] account.  Refills are not addressed on weekends; covering physicians do not authorize routine medications on weekends.  No narcotics or controlled substances are refilled after noon on Fridays or by on call physicians.  By law, narcotics must be electronically prescribed.  A 30 day supply with no refills is the maximum allowed.  If your prescription is due for a refill, you may be due for a follow up appointment.  To best provide you care, patients receiving routine medications need to be seen at least once a year.  Patients receiving narcotic/controlled substance medications need to be seen at least once every 3 months.  In the event that your preferred pharmacy does not have the requested medication in stock (e.g. Backordered), it is your responsibility to find another pharmacy that has the requested medication available.  We will gladly send a new prescription to that pharmacy at your request.    Scheduling Tests:    If your physician has ordered radiology tests such as MRI or CT scans, please contact Central Scheduling at [phone] right away to schedule the test.  Once scheduled, the [facility] Centralized Referral Team will work with your insurance carrier to obtain pre-certification or prior authorization.   Depending on your insurance carrier, approval may take 3-10 days.  It is highly recommended patients assure they have received an authorization before having a test performed.  If test is done without insurance authorization, patient may be responsible for the entire amount billed.      Precertification and Prior Authorizations:  If your physician has recommended that you have a procedure or additional testing performed the [facility] Centralized Referral Team will contact your insurance carrier to obtain pre-certification or prior authorization.    You are strongly encouraged to contact your insurance carrier to verify that your procedure/test has been approved and is a COVERED benefit.  Although the [facility] Centralized Referral Team does its due diligence, the insurance carrier gives the disclaimer that \"Although the procedure is authorized, this does not guarantee payment.\"    Ultimately the patient is responsible for payment.   Thank you for your understanding in this matter.  Paperwork Completion:  If you require FMLA or disability paperwork for your recovery, please make sure to either drop it off or have it faxed to our office at [phone]. Be sure the form has your name and date of birth on it.  The form will be faxed to our Forms Department and they will complete it for you.  There is a 25$ fee for all forms that need to be filled out.  Please be aware there is a 10-14 day turnaround time.  You will need to sign a release of information (MARLIN) form if your paperwork does not come with one.  You may call the Forms Department with any questions at [phone].  Their fax number is [phone].

## 2025-05-20 NOTE — PROGRESS NOTES
Neurology H&P    Lionel Velazquez Patient Status:  No patient class for patient encounter    1970 MRN [id number]   Location [facility], [place], [address] [place] [address] Attending No att. providers found   Hosp Day # 0 PCP ESTELLE REA, DO     Subjective:  Lionel Velazquez is a(n) 54 year old male.  History of Present Illness  Lionel Velazquez is a 54 year old male with multiple sclerosis who presents with worsening fatigue and vision changes.    He experiences significant fatigue that impacts his ability to engage in physical activities such as going to the gym. The fatigue is described as overwhelming, with difficulty in motivating himself due to tiredness.    He reports drastic vision changes, evaluated by [facility]. Despite normal distance vision, he was prescribed reading glasses with blue light filtering due to difficulty seeing his computer monitor. He experiences intermittent blurry vision, sometimes affecting his ability to see while driving. These symptoms persist despite a previous MRI in  showing no new lesions or changes in the brain or optic nerves.    He describes recurrent episodes of abdominal pain located between the umbilicus and chest, accompanied by shortness of breath and severe cramping. These episodes have led to multiple ER visits without a definitive cause identified. The pain lasts a few hours and is relieved by morphine, with residual soreness similar to post-exercise muscle soreness. He has a history of hospitalization for unexplained abdominal pain, with previous episodes in [place] and [place].    He mentions low back pain, evaluated by pain management and other specialists. He reports being informed that an MRI was misread, and surgery was suggested due to issues at L2-L3 and L3-L4, including instability and deterioration. He experiences pain radiating to the left leg and testicle, affecting his ability to exercise.    He is currently taking Vumerity  for his multiple sclerosis and recently refilled his prescription.     Current Medications:  Current Medications[1]    Problem List:  Problem List[2]    PMHx:  Past Medical History[3]    PSHx:  Past Surgical History[4]    SocHx:  Short Social Hx on File[5]    Family History:  Family History[6]        ROS:  10 point ROS completed and was negative, except for pertinent positive and negatives stated in subjective.    Objective/Physical Exam:    Vital Signs:  Blood pressure 128/78, pulse 80, resp. rate 16, weight 230 lb (104.3 kg).    Gen: Awake and in no apparent distress  HEENT: moist mucus membranes  Neck: Supple  Cardiovascular: Regular rate and rhythm, no murmur  Pulm: CTAB  GI: non-tender, normal bowel sounds  Skin: normal, dry  Extremities: No clubbing or cyanosis      Neurologic:   Physical Exam    MENTAL STATUS: alert, ox3, normal attention, language and fund of knowledge.      CRANIAL NERVES II to XII: PERRLA, no ptosis or diplopia, EOM intact, facial sensation intact, strong eye closure, face is symmetric, no dysarthria, tongue midline,  no tongue fasciculations or atrophy, strong shoulder shrug.    MOTOR EXAMINATION: normal tone, no fasciculations, normal strength throughout in UEs and LEs except.    SENSORY EXAMINATION:  UE: intact to light touch, pinprick intact  LE: intact to light touch, pinprick intact    COORDINATION:  No dysmetria, or intention tremors     REFLEXES: 2+ at biceps, 2+ brachioradialis, 2+ at patella     GAIT: normal stance, normal gait           Labs:       Imaging:  MRI Brain 8/16/17  CONCLUSION:    #1. No evidence of hemorrhage or acute or pleural infarction. No mass.  2. There are numerous foci of T2/flair hyperintensity in the periventricular deep white matter and subcortical white matter of the cerebrum for a patient of this age. The differential diagnosis would include demyelination, chronic microvascular change,   changes related to previous inflammatory or infectious disease.  Demyelination is favored. No abnormal signal within the brainstem or cerebellum.     MRI Brain 7/2021    IMPRESSION:   1. Numerous T2/FLAIR hyperintense foci within the periventricular and subcortical white matter have minimally progressed from 2017, consistent with chronic demyelinating plaques. No abnormal enhancing lesions are identified to suggest active   demyelination.   2. Mild mucosal thickening within the ethmoid air cells.   3. Otherwise unremarkable pre and postcontrast brain and orbit MRI studies.      MRI C spine 7/2021  IMPRESSION:   1. No demyelinating lesions are identified in the cervical spinal cord. No cervical spinal cord abnormalities are identified.     2. Minimal to mild multilevel cervical spondylosis. Minimal disc bulges at C2-C3 and C3-C4. Mild asymmetric disc bulge to the left and minimal left-sided uncovertebral spurring at C4-C5. Mild asymmetric disc bulge to the right at C5-C6. Mild central disc    bulge at C6-C7. No significant cervical spinal or neural foraminal stenosis.     3. Cervical spine alignment is normal. No fracture.      MRI Brain and orbits 6/19/23  CONCLUSION:       1. No acute intracranial abnormality identified.  No discrete orbital abnormality is seen.  No specific MRI findings to suggest optic neuritis.      2. Stable chronic demyelinating plaque burden in the supratentorial white matter.  No enhancing focus to suggest active demyelination.  No significant interval brain parenchymal volume loss.        Assessment & Plan  Multiple sclerosis  Well-managed relapsing-remitting MS with no new lesions on MRI since 2023. Symptoms include fatigue and intermittent vision changes not explained by MS lesions. Abdominal pain not confirmed as MS hug.  - Order MRI of brain, neck, and thoracic spine to evaluate for new MS lesions.  - Continue Diroximel fumarate (Vumerity) 231 MG orally twice daily.  - Follow up in a few months to review MRI results and assess for any new lesions or  changes in symptoms.    Vision changes  Intermittent blurry vision not explained by MS lesions. Recent eye examination showed no significant findings. Vision changes may be related to ophthalmologic issues.  - Order MRI of brain to evaluate for any changes related to vision.  - Continue to follow up with ophthalmology for vision changes.    Fatigue  Significant fatigue impacting daily activities. No new MS lesions identified to explain fatigue. Low exercise tolerance.  - Encourage increased exercise tolerance as tolerated.    Degenerative disc disease with foraminal stenosis  Degenerative disc disease with foraminal stenosis at L2-L3 and L3-L4. Possible retrolisthesis causing instability. Symptoms include low back pain radiating to the left leg and testicle. Surgical intervention suggested by another provider, but second opinion recommended.  - Follow up with neurosurgery for evaluation of degenerative disc disease and potential surgical intervention.           Paul Lincoln DO       [1]   Current Outpatient Medications   Medication Sig Dispense Refill    VUMERITY 231 MG Oral Capsule Delayed Release TAKE 2 CAPSULES TWICE A  capsule 0    LEVOTHYROXINE 88 MCG Oral Tab Take 1 tablet (88 mcg total) by mouth before breakfast. 90 tablet 1    azelastine 137 MCG/SPRAY Nasal Solution USE 1 SPRAY INTO EACH NOSTRIL TWICE A DAY 1 each 1    MIEBO 1.338 GM/ML Ophthalmic Solution       CLONAZEPAM 1 MG Oral Tab TAKE 1 TABLET BY MOUTH NIGHTLY AS NEEDED FOR ANXIETY 30 tablet 2    AMLODIPINE 10 MG Oral Tab TAKE 1 TABLET BY MOUTH EVERY DAY 90 tablet 0    TESTOSTERONE CYPIONATE 200 mg/mL Intramuscular Solution INJECT 1ML INTO THE MUSCLE EVERY 14 DAYS 6 mL 1    atorvastatin 40 MG Oral Tab TAKE 1 TABLET BY MOUTH EVERY DAY 90 tablet 1    Triamterene-HCTZ 37.5-25 MG Oral Tab Take 1 tablet by mouth daily. 90 tablet 0    omeprazole 20 MG Oral Capsule Delayed Release Take 1 capsule (20 mg total) by mouth every morning.      Needle,  Disp, 18G X 1\" Does not apply Misc Use for testosterone  each 1    Syringe/Needle, Disp, (BD LUER-IDANIA SYRINGE) 25G X 1\" 3 ML Does not apply Misc To inject testosterone IM q 2 weeks 10 each 1    Syringe/Needle, Disp, (BD LUER-LOCK SYRINGE) 18G X 1-1/2\" 3 ML Does not apply Misc 1 injection every 14 days 50 each 0    Syringe/Needle, Disp, (SYRINGE LUER LOCK) 25G X 1-1/2\" 3 ML Does not apply Misc 1 each every 14 (fourteen) days. Pt using terumo luer lock syringes 100 each 0    aspirin 81 MG Oral Tab Take 1 tablet (81 mg total) by mouth daily.      FLUTICASONE PROPIONATE, NASAL, (FLONASE) 50 MCG/ACT Nasal Suspension daily      Fexofenadine HCl (ALLEGRA) 180 MG Oral Tab Take  by mouth daily as needed.     [2]   Patient Active Problem List  Diagnosis    Obesity    Polycythemia    Male hypogonadism    H/O Hashimoto thyroiditis    Hypertension    Special screening for malignant neoplasm of prostate    Hyperlipidemia    Hypothyroidism due to acquired atrophy of thyroid    Acute maxillary sinusitis    FH: gastric cancer    Blurry vision, left eye    Multiple sclerosis (HCC)    Testicular hypofunction    Low testosterone in male    History of adenomatous polyp of colon    Chronic sinusitis    Tubular adenoma    Benign essential hypertension    Benign prostatic hyperplasia    Diverticulosis of colon    Hypothyroidism    Incomplete right bundle branch block    Internal hemorrhoids    Lumbar radiculopathy    Polycythemia vera (HCC)    Impotence of organic origin    Morbid obesity (HCC)    Relapsing remitting multiple sclerosis (HCC)    Well adult health check    Benign neoplasm of transverse colon    Benign neoplasm of sigmoid colon    Disorder of kidney and ureter    Hypogonadism in male    Polycythemia vera (HCC)    Postprocedural state    Incidental finding of severe acute respiratory syndrome coronavirus 2 (SARS-CoV-2) infection    Screening for malignant neoplasm of prostate    Abdominal pain    Abnormal feces     Chronic prostatitis    Disorder of male genital organ    Inguinal hernia    Low back pain    Diarrhea, unspecified    Heartburn   [3]   Past Medical History:   Abdominal hernia    Abdominal pain    After having a few drinks    Anxiety    Arthritis    Back pain    Lower right side    Bloating    Body piercing    left ear    Chronic rhinitis    Diarrhea, unspecified    After having a couple of drinks. Lasts about 3 days    Heartburn    High cholesterol    Hyperlipidemia    HYPOTHYROIDISM    Hypothyroidism    hashimoto's    Multiple sclerosis (HCC)    Obesity    Fat but loosing weight    Other abnormal glucose    Loss of peripheral vision    OTHER DISEASES    hypogonadism    Pain in joints    Shingles    Gold Hunt    Sleep apnea    Sleep disturbance    Thyroid disease    Unspecified essential hypertension    Wears glasses    Weight gain   [4]   Past Surgical History:  Procedure Laterality Date    Arthroscopy of joint unlisted      yoav shoulder    Colonoscopy      Hernia surgery  2007,11/2009    x3    Other surgical history  2008    SINUS x2   [5]   Social History  Socioeconomic History    Marital status:    Tobacco Use    Smoking status: Former     Current packs/day: 0.00     Average packs/day: 1 pack/day for 20.0 years (20.0 ttl pk-yrs)     Types: Cigarettes     Start date: 1980     Quit date: 2000     Years since quittin.3    Smokeless tobacco: Never   Vaping Use    Vaping status: Never Used   Substance and Sexual Activity    Alcohol use: Yes     Alcohol/week: 3.0 standard drinks of alcohol     Types: 3 Standard drinks or equivalent per week     Comment: SOCIAL, 2-3 per week    Drug use: Not Currently     Types: Cannabis     Comment: Cannabis like 1-2X a year max.   Other Topics Concern    Caffeine Concern Yes    Stress Concern No    Weight Concern Yes     Comment: currently loosing weight.    Special Diet No    Exercise Yes     Comment: Not often    Seat Belt Yes     Social Drivers of  Health      Received from [facility]   [6]   Family History  Problem Relation Age of Onset    Cancer Father         GE junction    Alcohol and Other Disorders Associated Father     Colon Cancer Father     Hypertension Father     Lipids Father     Heart Disorder Father     Cancer Maternal Grandmother         breast    Breast Cancer Maternal Grandmother         Grandmother on moms side    Heart Disorder Maternal Grandfather     Cancer Maternal Grandfather     Heart Attack Maternal Grandfather     Heart Attack Paternal Grandmother     Heart Disorder Paternal Grandmother     Cancer Paternal Grandfather     Cancer Paternal Uncle     Lipids Brother

## 2025-05-20 NOTE — TELEPHONE ENCOUNTER
Message below noted.    Patient requesting Provider complete LOV note from 5/19/25. Patient requesting note include diagnoses and surgery recommendation.     [facility] to see if he can do without added PT. Patient states was able to complete MRI as well as a few other procedures without PT.    Routed to Provider.

## 2025-05-21 NOTE — TELEPHONE ENCOUNTER
Message from patient received.    Patient acknowledged and appreciative.    Nothing needed further with this encounter.

## 2025-05-23 ENCOUNTER — PATIENT MESSAGE (OUTPATIENT)
Dept: FAMILY MEDICINE CLINIC | Facility: CLINIC | Age: 55
End: 2025-05-23

## 2025-05-23 NOTE — TELEPHONE ENCOUNTER
Message received from patient.    Patient states he was discussing with his PCP who advised if Dr. Stanton reviewed his results and if he thinks surgery is necessary.    Confirmed that Dr. Stanton is a surgeon at out office. Advised that he would not have reviewed his results as he is not his Doctor as he is currently following Dr. Hernandez.     Advised patient if he wishes for Dr. Stanton opinion he will need to schedule an appointment. Advised Dr. Stanton is currently booked out until July. Advised if he wishes to see Dr. Stanton we will have to confirm appointment is okay with Providers as they are within the same practice.    Advised patient if he wishes to get a second opinion elsewhere regarding surgery we encourage him to do so as that is his right as the patient.

## 2025-05-27 ENCOUNTER — TELEPHONE (OUTPATIENT)
Dept: ORTHOPEDICS CLINIC | Facility: CLINIC | Age: 55
End: 2025-05-27

## 2025-05-27 NOTE — TELEPHONE ENCOUNTER
4/28/25 MRI lumbar spine [facility] EPIC  3/12/25 XR lumbar spine [facility]    No further imaging needed prior to appointment at this time.    Future Appointments   Date Time Provider Department Center   5/30/2025 11:00 AM Daniel Mccabe MD [facility] [address]   6/13/2025  2:45 PM Dolly Whaley [facility] PHYS T [place]   6/17/2025  3:00 PM  MR RM4 (3T WIDE)  [facility]   6/17/2025  4:00 PM  MR RM4 (3T WIDE)  [facility]   6/17/2025  5:00 PM  MR RM4 (3T WIDE)  [facility]   6/23/2025  6:00 PM Dolly Whaley [facility] PHYS T [place]

## 2025-05-27 NOTE — TELEPHONE ENCOUNTER
Patient scheduled on [facility] for 2nd opinion. I was told I need L2-3, L3-4(LLIF) and L4-5 (TLIF). Xrays and MRI in [facility]. Please advise if imaging is needed.  Future Appointments   Date Time Provider Department Center   5/30/2025 11:00 AM Daniel Mccabe MD [facility] [address]   6/13/2025  2:45 PM Dolly Whaley [facility] PHYS T [place]   6/17/2025  3:00 PM  MR RM4 (3T WIDE)  MRI [facility]   6/17/2025  4:00 PM  MR RM4 (3T WIDE)  MRI [facility]   6/17/2025  5:00 PM  MR RM4 (3T WIDE)  MRI [facility]   6/23/2025  6:00 PM Dolly Whaley [facility] PHYS T [place]

## 2025-05-30 ENCOUNTER — OFFICE VISIT (OUTPATIENT)
Facility: CLINIC | Age: 55
End: 2025-05-30
Payer: COMMERCIAL

## 2025-05-30 VITALS — WEIGHT: 230 LBS | HEIGHT: 68 IN | BODY MASS INDEX: 34.86 KG/M2

## 2025-05-30 DIAGNOSIS — M54.16 LUMBAR RADICULOPATHY: Primary | ICD-10-CM

## 2025-05-30 DIAGNOSIS — F41.1 GENERALIZED ANXIETY DISORDER: ICD-10-CM

## 2025-05-30 PROCEDURE — 99215 OFFICE O/P EST HI 40 MIN: CPT | Performed by: STUDENT IN AN ORGANIZED HEALTH CARE EDUCATION/TRAINING PROGRAM

## 2025-05-30 NOTE — H&P
[facility] - ORTHOPEDICS  [address], [address], [place] [address]  [address] [place] [address]  [phone]     NEW PATIENT VISIT - HISTORY AND PHYSICAL EXAMINATION     Name: Lionel Velazquez   MRN: [id number]  Date: 05/30/25       CC: back and leg pain    REFERRED BY: ESTELLE REA,     HPI:   Lionel Velazquez is a very pleasant 55 year old male who presents today for evaluation of back and leg pain. The distribution of symptoms are: 50% backpain and 50% leg pain. The symptoms began 7 month(s) ago without any significant injury. Since the onset, the symptoms have become slowly worse over time. Patient feels pain is aggravated by walking, standing and improved by rest. The patient reports no numbness and no weakness.  The symptom characteristics are as follows: Patient is a 55-year-old male presenting with back pain radiating down right lower extremity including groin and down the posterior buttock and thigh.  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 .     Prior spine surgery: none.    Bowel and bladder symptoms: absent.    The patient has not had issues with balance and/or hand dexterity problems such as changes in penmanship or the use of buttons or zippers.    Treatment up to this time has included:    Evaluation: other spine surgeon  NSAIDS: have worked well  Narcotic use: None  Physical therapy: None  Spinal injections: None  Others:       PMH:   Past Medical History[1]    PAST SURGICAL HX:  Past Surgical History[2]    FAMILY HX:  Family History[3]    ALLERGIES:  Dust, Grass, Losartan, Mold, Penicillins, Reglan [metoclopramide], and Seasonal    MEDICATIONS: Current Medications[4]    ROS: A comprehensive 14 point review of systems was performed and was negative aside from the aforementioned per history of present illness.    SOCIAL HX:  Social History     Tobacco Use    Smoking status: Former     Current packs/day: 0.00      Average packs/day: 1 pack/day for 20.0 years (20.0 ttl pk-yrs)     Types: Cigarettes     Start date: 1980     Quit date: 2000     Years since quittin.3    Smokeless tobacco: Never   Substance Use Topics    Alcohol use: Yes     Alcohol/week: 3.0 standard drinks of alcohol     Types: 3 Standard drinks or equivalent per week     Comment: SOCIAL, 2-3 per week         PE:   Vitals:    25 1107   Weight: 230 lb (104.3 kg)   Height: 5' 8\" (1.727 m)     Estimated body mass index is 34.97 kg/m² as calculated from the following:    Height as of this encounter: 5' 8\" (1.727 m).    Weight as of this encounter: 230 lb (104.3 kg).    Physical Exam  Constitutional:       Appearance: Normal appearance.   HENT:      Head: Normocephalic and atraumatic.   Eyes:      Extraocular Movements: Extraocular movements intact.   Cardiovascular:      Pulses: Normal pulses. Skin warm and well perfused.  Pulmonary:      Effort: Pulmonary effort is normal. No respiratory distress.   Skin:     General: Skin is warm.   Psychiatric:         Mood and Affect: Mood normal.     Spine Exam:    Normal gait without difficulty  Able to heel, toe, tandem gait without difficulty  Level shoulders and hips in even stance    Restricted L-spine ROM    No tenderness to palpation of L-spine    Straight leg raise test: negative    Sustained clonus: negative    LE Strength: 5/5 IP QUAD TA EHL GSC  LE Sensation: normal in L2-S1 distribution  LE reflexes: normal    Radiographic Examination/Diagnostics:  XR and MRI personally viewed, independently interpreted and radiology report was reviewed.  X-ray of the lumbar spine demonstrates transitional anatomy  MRI of the lumbar spine demonstrates multilevel spinal stenosis with severe lateral recess stenosis L5-L6, severe canal stenosis L4-5, and right foraminal stenosis L3-4    IMPRESSION: Lionel Velazquez is a 55 year old male with multilevel lumbar stenosis and radiculopathy, predominant symptoms  appear to originate from L3-4. Discussed with patient options.    PLAN:     We reviewed the patients history, symptoms, exam findings, and imaging today.  We had a detailed discussion outlining the etiology, anatomy, pathophysiology, and natural history of lumbar stenosis. The typical management of this condition may include lifestyle modification, NSAIDs, physical therapy, oral steroids, epidural injections, neuromodulatory medications, and sometimes pain medications.    - Agree w/ PT, patient appointments scheduled  - Referred patient for trial of THONY  - Agree w/ L2-5 interbody fusion, if symptoms do not improve, consider surgical intervention      Daniel Mccabe MD  Orthopedic Spine Surgeon  [facility]   [address] [place] [address], [address] [place] [address] [id number] [place] [email or web address].org  [email or web address]  t: [phone]   f: [phone]        This note was dictated using Dragon software.  While it was briefly proofread prior to completion, some grammatical, spelling, and word choice errors due to dictation may still occur.             [1]   Past Medical History:   Abdominal hernia    Abdominal pain    After having a few drinks    Anxiety    Arthritis    Back pain    Lower right side    Bloating    Body piercing    left ear    Chronic rhinitis    Diarrhea, unspecified    After having a couple of drinks. Lasts about 3 days    Heartburn    High cholesterol    Hyperlipidemia    HYPOTHYROIDISM    Hypothyroidism    hashimoto's    Multiple sclerosis (HCC)    Obesity    Fat but loosing weight    Other abnormal glucose    Loss of peripheral vision    OTHER DISEASES    hypogonadism    Pain in joints    Shingles    Gold Hunt    Sleep apnea    Sleep disturbance    Thyroid disease    Unspecified essential hypertension    Wears glasses    Weight gain   [2]   Past Surgical History:  Procedure Laterality Date    Arthroscopy of joint unlisted      yoav shoulder    Colonoscopy       Hernia surgery  8/2007,11/2009    x3    Other surgical history  09/2008    SINUS x2   [3]   Family History  Problem Relation Age of Onset    Cancer Father         GE junction    Alcohol and Other Disorders Associated Father     Colon Cancer Father     Hypertension Father     Lipids Father     Heart Disorder Father     Cancer Maternal Grandmother         breast    Breast Cancer Maternal Grandmother         Grandmother on moms side    Heart Disorder Maternal Grandfather     Cancer Maternal Grandfather     Heart Attack Maternal Grandfather     Heart Attack Paternal Grandmother     Heart Disorder Paternal Grandmother     Cancer Paternal Grandfather     Cancer Paternal Uncle     Lipids Brother    [4]   Current Outpatient Medications   Medication Sig Dispense Refill    VUMERITY 231 MG Oral Capsule Delayed Release TAKE 2 CAPSULES TWICE A  capsule 0    LEVOTHYROXINE 88 MCG Oral Tab Take 1 tablet (88 mcg total) by mouth before breakfast. 90 tablet 1    azelastine 137 MCG/SPRAY Nasal Solution USE 1 SPRAY INTO EACH NOSTRIL TWICE A DAY 1 each 1    MIEBO 1.338 GM/ML Ophthalmic Solution       CLONAZEPAM 1 MG Oral Tab TAKE 1 TABLET BY MOUTH NIGHTLY AS NEEDED FOR ANXIETY 30 tablet 2    AMLODIPINE 10 MG Oral Tab TAKE 1 TABLET BY MOUTH EVERY DAY 90 tablet 0    TESTOSTERONE CYPIONATE 200 mg/mL Intramuscular Solution INJECT 1ML INTO THE MUSCLE EVERY 14 DAYS 6 mL 1    atorvastatin 40 MG Oral Tab TAKE 1 TABLET BY MOUTH EVERY DAY 90 tablet 1    Triamterene-HCTZ 37.5-25 MG Oral Tab Take 1 tablet by mouth daily. 90 tablet 0    omeprazole 20 MG Oral Capsule Delayed Release Take 1 capsule (20 mg total) by mouth every morning.      Needle, Disp, 18G X 1\" Does not apply Misc Use for testosterone  each 1    Syringe/Needle, Disp, (BD LUER-IDANIA SYRINGE) 25G X 1\" 3 ML Does not apply Misc To inject testosterone IM q 2 weeks 10 each 1    Syringe/Needle, Disp, (BD LUER-LOCK SYRINGE) 18G X 1-1/2\" 3 ML Does not apply Misc 1 injection every  14 days 50 each 0    Syringe/Needle, Disp, (SYRINGE LUER LOCK) 25G X 1-1/2\" 3 ML Does not apply Misc 1 each every 14 (fourteen) days. Pt using terumo luer lock syringes 100 each 0    aspirin 81 MG Oral Tab Take 1 tablet (81 mg total) by mouth daily.      FLUTICASONE PROPIONATE, NASAL, (FLONASE) 50 MCG/ACT Nasal Suspension daily      Fexofenadine HCl (ALLEGRA) 180 MG Oral Tab Take  by mouth daily as needed.

## 2025-06-02 ENCOUNTER — OFFICE VISIT (OUTPATIENT)
Dept: PAIN CLINIC | Facility: CLINIC | Age: 55
End: 2025-06-02
Payer: COMMERCIAL

## 2025-06-02 VITALS
SYSTOLIC BLOOD PRESSURE: 142 MMHG | HEART RATE: 82 BPM | WEIGHT: 230 LBS | OXYGEN SATURATION: 98 % | DIASTOLIC BLOOD PRESSURE: 86 MMHG | BODY MASS INDEX: 35 KG/M2

## 2025-06-02 DIAGNOSIS — M48.062 SPINAL STENOSIS OF LUMBAR REGION WITH NEUROGENIC CLAUDICATION: Primary | ICD-10-CM

## 2025-06-02 PROCEDURE — 99204 OFFICE O/P NEW MOD 45 MIN: CPT | Performed by: PHYSICIAN ASSISTANT

## 2025-06-02 RX ORDER — CLONAZEPAM 1 MG/1
1 TABLET ORAL NIGHTLY PRN
Qty: 30 TABLET | Refills: 3 | Status: SHIPPED | OUTPATIENT
Start: 2025-06-02

## 2025-06-02 NOTE — PROGRESS NOTES
Patient: Lionel Velazquez  Medical Record Number: [id number]  Site: [facility]  Referring Physician:  Daniel Mccabe  PCP: Dr. De Leon    Dear Dr. Mccabe:    Thank you very much for requesting this consultation. I had the opportunity to evaluate and initiate care for your patient today, as per your request.    HISTORY OF CHIEF COMPLAINT:      Lionel Velazquez is a 55 year old male, who complains of low back and radiating R > L LE pain along gluteal region and posterior thigh, as well as R groin.    Pt is here today at the request of spine surgery with pain in above described distribution that began atraumatically years ago.  Initially, it was less frequent and intense, though over the past 7 months, has significantly impacted his ADL tolerance.  At rest, he is pain free, though with standing and walking, has rapid onset of pain.  He was seen by PCP, and after XR, sent to surgery.  Sent for MRI, and had been offered surgery.  Sought a second opinion with spine surgery, and wh ile he did agree with surgery, give that he had good but temporary relief with PO steroid, suggested he try LESIs.      VAS Pain Score:  0-7/10    Aggravating Factors: Relieving Factors:   Standing  Walking  Sitting      Past Treatment Attempted/Patient’s Response:  As above     Past Medical History[1]   Past Surgical History[2]   Family History[3]   Social Hx on file[4]   Current Medications:  Current Medications[5]     Functional Assessment: Patient reports that they are able to complete all of their ADL's such as eating, bathing, using the toilet, dressing and getting up from a bed or a chair independently.    Work History:  The patient currently works full time as .       REVIEW OF SYSTEMS:   10 point review of systems is otherwise negative,unless otherwise in HPI.      Radiology/Lab Test Reviewed:  MRI L spine 4/28/25:    L1-L2:  Unremarkable.      L2-L3:  There is a broad-based left paracentral/foraminal disc protrusion.   This results in left subarticular zone narrowing and mild central canal stenosis.  Mild facet hypertrophy.  No right-sided foraminal narrowing.  Mild to moderate left foraminal narrowing.      L3-L4: Mild to moderate facet hypertrophy.  Mild to moderate posterior disc osteophyte complex.  Mild spinal canal stenosis.  Moderate to severe right and mild to moderate left foraminal narrowing.      L4-5:  Moderate facet hypertrophy.  Mild to moderate posterior disc osteophyte complex.  Moderate to severe spinal canal stenosis.  Bilateral subarticular zone narrowing.  Moderate bilateral foraminal narrowing.      L5-L6:  Moderate facet hypertrophy.  Minimal disc bulge.  Mild to moderate spinal canal stenosis with right-sided subarticular zone narrowing.  Mild to moderate bilateral foraminal narrowing.      L6-S1:  Mild to moderate facet hypertrophy.  No spinal canal or foraminal narrowing.         CBC:    Lab Results   Component Value Date    WBC 4.9 05/14/2025    WBC 5.5 05/09/2025    WBC 5.6 04/18/2025     Lab Results   Component Value Date    HEMOGLOBIN 18.4 (H) 02/24/2016    HEMOGLOBIN 19.7 (H) 10/22/2015    HEMOGLOBIN 20.8 (H) 06/18/2014     Lab Results   Component Value Date    .0 05/14/2025    .0 05/09/2025    .0 04/18/2025         PHYSICAL EXAMIMATION   PHYSICAL EXAMINATION: Lionel Velazquez is a 55 year old male who is observed sitting comfortably on a chair in the exam room alert and oriented times three. He looks consistent with his stated age.    Ht Readings from Last 1 Encounters:   05/30/25 68\"     Wt Readings from Last 1 Encounters:   05/30/25 230 lb (104.3 kg)     The patient is well developed, well nourished, well muscled, obese. He moves independently from sitting to standing with ease.       Inspection:  No acute distress    Patient displays Non-antalgic gait.    Coordination:  Well-coordinated, fluent gait, able to engage in rapid alternating movements of upper and lower  extremities.    ROM Lumbar Spine:  See chart below:  Motion Right (+ or -) Left (+ or -)   Lumbar Flexion + +   Lumbar Extension + +   Lumbar Bending - -   Lumbar Extension/ twisting motion - -     Lumbar/Sacral Integument:  Skin over lumbar sacral spine is intact without rashes, excoriations, lesions or erythema noted    Palpation:  See chart below:  Palpation of lumbar area Right (+ or -) Left (+ or -)   Lumbar facets - -   Lumbar paraspinals - -   Piriformis - -   SIJ - -   Trochanteric Bursa - -     Deep Tendon Reflexes:  Grossly intact to bilateral lower extremities 2/4 throughout    Strength:  Strength of bilateral lower extremities grossly intact; 5/5 throughout    Sensation:  No sensory deficits noted on bilateral lower extremities to light touch    Tests:  Test Right (+ or -) Left (+ or -)   SLR - -   Sharath’s     Babinski     Clonus       HEAD/NECK: Head is normocephalic, neck supple  EYES: EOMI, JABIER  SKIN EXAM: Skin is intact, head, neck, trunk and arms/legs. No rashes, mottling or ulcerations.  LYMPH EXAM: There is no lymph edema in either lower extremity.  VASCULAR EXAM: Pedal pulses are normal bilaterally, with good distal perfusion. No clubbing or cyanosis.  ABDOMINAL EXAM: Abdomen is soft without masses palpated.  HEART: normal, regular, S1 and S2  LUNGS:CTA  MUSCULOSKELETAL: Smooth, pain-free ROM to bilat hips, ankles, and knees.     Do you have any known blood/bleeding disorders?  No  Does patient currently take blood thinners?   None  Does patient currently take any antibiotics?   No      Patient is currently on pain medications:  No  Reason pain medications are prescribed: N/A  Pain medications are prescribed by: N/A  [place] Prescription Monitoring review: N/A  DIRE: N/A  Treatment decision: N/A    MEDICAL DECISION MAKING:     Impression: Lumbar stenosis with neurogenic claudication    Patient has low back and radiating bilateral gluteal pain to the right posterior thigh and groin in the  setting of MRI evidence of transitional anatomy (6 lumbar-type vertebrae) with varying degrees of foraminal and central canal stenosis, worse at the L4-5.  He has found good but temporary relief with oral steroid.  He has been active with HEP to limited relief, and is scheduled to begin formal physical therapy.  He has been offered a 3 level fusion through neurosurgery, but prior to proceeding did seek a second opinion with spine surgery.  They did agree with the surgery, though given his relief with p.o. steroid acidly consider LESI.    On exam, does have pain with range of motion lumbar spine with both flexion and extension.  No focal sensory/motor deficits.  Negative straight leg raise.    Will proceed with the requested interlaminar THONY, risks and benefits of which were reviewed in detail.  Follow-up 2 to 3 weeks.    Plan: LESI at his earliest convenience, follow-up 2 to 3 weeks.  Remain diligent with HEP and initiate formal PT as scheduled.    The patient indicates understanding of these issues and agrees to the plan.      Thank you very much.     Respectfully yours,    SHELLEY Lane         [1]   Past Medical History:   Abdominal hernia    Abdominal pain    After having a few drinks    Anxiety    Arthritis    Back pain    Lower right side    Bloating    Body piercing    left ear    Chronic rhinitis    Diarrhea, unspecified    After having a couple of drinks. Lasts about 3 days    Heartburn    High cholesterol    Hyperlipidemia    HYPOTHYROIDISM    Hypothyroidism    hashimoto's    Multiple sclerosis (HCC)    Obesity    Fat but loosing weight    Other abnormal glucose    Loss of peripheral vision    OTHER DISEASES    hypogonadism    Pain in joints    Shingles    [place]    Sleep apnea    Sleep disturbance    Thyroid disease    Unspecified essential hypertension    Wears glasses    Weight gain   [2]   Past Surgical History:  Procedure Laterality Date    Arthroscopy of joint unlisted      yoav  shoulder    Colonoscopy      Hernia surgery  2007,11/2009    x3    Other surgical history  2008    SINUS x2   [3]   Family History  Problem Relation Age of Onset    Cancer Father         GE junction    Alcohol and Other Disorders Associated Father     Colon Cancer Father     Hypertension Father     Lipids Father     Heart Disorder Father     Cancer Maternal Grandmother         breast    Breast Cancer Maternal Grandmother         Grandmother on moms side    Heart Disorder Maternal Grandfather     Cancer Maternal Grandfather     Heart Attack Maternal Grandfather     Heart Attack Paternal Grandmother     Heart Disorder Paternal Grandmother     Cancer Paternal Grandfather     Cancer Paternal Uncle     Lipids Brother    [4]   Social History  Socioeconomic History    Marital status:    Tobacco Use    Smoking status: Former     Current packs/day: 0.00     Average packs/day: 1 pack/day for 20.0 years (20.0 ttl pk-yrs)     Types: Cigarettes     Start date: 1980     Quit date: 2000     Years since quittin.3    Smokeless tobacco: Never   Vaping Use    Vaping status: Never Used   Substance and Sexual Activity    Alcohol use: Yes     Alcohol/week: 3.0 standard drinks of alcohol     Types: 3 Standard drinks or equivalent per week     Comment: SOCIAL, 2-3 per week    Drug use: Not Currently     Types: Cannabis     Comment: Cannabis like 1-2X a year max.   Other Topics Concern    Caffeine Concern Yes    Stress Concern No    Weight Concern Yes     Comment: currently loosing weight.    Special Diet No    Exercise Yes     Comment: Not often    Seat Belt Yes   [5]   Current Outpatient Medications   Medication Sig Dispense Refill    clonazePAM 1 MG Oral Tab Take 1 tablet (1 mg total) by mouth nightly as needed for Anxiety. 30 tablet 3    VUMERITY 231 MG Oral Capsule Delayed Release TAKE 2 CAPSULES TWICE A  capsule 0    LEVOTHYROXINE 88 MCG Oral Tab Take 1 tablet (88 mcg total) by mouth before breakfast.  90 tablet 1    azelastine 137 MCG/SPRAY Nasal Solution USE 1 SPRAY INTO EACH NOSTRIL TWICE A DAY 1 each 1    MIEBO 1.338 GM/ML Ophthalmic Solution       AMLODIPINE 10 MG Oral Tab TAKE 1 TABLET BY MOUTH EVERY DAY 90 tablet 0    TESTOSTERONE CYPIONATE 200 mg/mL Intramuscular Solution INJECT 1ML INTO THE MUSCLE EVERY 14 DAYS 6 mL 1    atorvastatin 40 MG Oral Tab TAKE 1 TABLET BY MOUTH EVERY DAY 90 tablet 1    Triamterene-HCTZ 37.5-25 MG Oral Tab Take 1 tablet by mouth daily. 90 tablet 0    omeprazole 20 MG Oral Capsule Delayed Release Take 1 capsule (20 mg total) by mouth every morning.      Needle, Disp, 18G X 1\" Does not apply Misc Use for testosterone  each 1    Syringe/Needle, Disp, (BD LUER-IDANIA SYRINGE) 25G X 1\" 3 ML Does not apply Misc To inject testosterone IM q 2 weeks 10 each 1    Syringe/Needle, Disp, (BD LUER-LOCK SYRINGE) 18G X 1-1/2\" 3 ML Does not apply Misc 1 injection every 14 days 50 each 0    Syringe/Needle, Disp, (SYRINGE LUER LOCK) 25G X 1-1/2\" 3 ML Does not apply Misc 1 each every 14 (fourteen) days. Pt using terumo luer lock syringes 100 each 0    aspirin 81 MG Oral Tab Take 1 tablet (81 mg total) by mouth daily.      FLUTICASONE PROPIONATE, NASAL, (FLONASE) 50 MCG/ACT Nasal Suspension daily      Fexofenadine HCl (ALLEGRA) 180 MG Oral Tab Take  by mouth daily as needed.

## 2025-06-02 NOTE — TELEPHONE ENCOUNTER
Requested Prescriptions     Pending Prescriptions Disp Refills    CLONAZEPAM 1 MG Oral Tab [Pharmacy Med Name: CLONAZEPAM 1 MG TABLET] 30 tablet 0     Sig: TAKE 1 TABLET BY MOUTH NIGHTLY AS NEEDED FOR ANXIETY.     Last refill 3/3/25 #30 x 2  LOV 4/18/25  Future Appointments   Date Time Provider Department Center   None    RTC n/a

## 2025-06-02 NOTE — PROGRESS NOTES
Subjective:   Patient ID: Lionel Velazquez is a 55 year old male.    HPI    History/Other:   Review of Systems  Current Medications[1]  Allergies:Allergies[2]    Objective:   Physical Exam  Constitutional:          Assessment & Plan:   No diagnosis found.    No orders of the defined types were placed in this encounter.      Meds This Visit:  Requested Prescriptions      No prescriptions requested or ordered in this encounter       Imaging & Referrals:  None      Location of Pain: right side lumbar radiculopathy    Date Pain Began: 7 months          Work Related:   No        Receiving Work Comp/Disability:   No    Numeric Rating Scale:  Pain at Present:  7                                                                                                            (No Pain) 0  to  10 (Worst Pain)                 Minimum Pain:   1  Maximum Pain  9    Distribution of Pain:    right    Quality of Pain:   aching and sharp/stabbing    Origin of Pain:    Repetitive motion and Degenerative    Aggravating Factors:    Standing and Walking    Past Treatments for Current Pain Condition:   Other oral steroid    Prior diagnostic testing for your pain:  xray and MRI            [1]  Current Outpatient Medications   Medication Sig Dispense Refill   • clonazePAM 1 MG Oral Tab Take 1 tablet (1 mg total) by mouth nightly as needed for Anxiety. 30 tablet 3   • VUMERITY 231 MG Oral Capsule Delayed Release TAKE 2 CAPSULES TWICE A  capsule 0   • LEVOTHYROXINE 88 MCG Oral Tab Take 1 tablet (88 mcg total) by mouth before breakfast. 90 tablet 1   • azelastine 137 MCG/SPRAY Nasal Solution USE 1 SPRAY INTO EACH NOSTRIL TWICE A DAY 1 each 1   • MIEBO 1.338 GM/ML Ophthalmic Solution      • AMLODIPINE 10 MG Oral Tab TAKE 1 TABLET BY MOUTH EVERY DAY 90 tablet 0   • TESTOSTERONE CYPIONATE 200 mg/mL Intramuscular Solution INJECT 1ML INTO THE MUSCLE EVERY 14 DAYS 6 mL 1   • atorvastatin 40 MG Oral Tab TAKE 1 TABLET BY MOUTH EVERY DAY 90 tablet 1    • Triamterene-HCTZ 37.5-25 MG Oral Tab Take 1 tablet by mouth daily. 90 tablet 0   • omeprazole 20 MG Oral Capsule Delayed Release Take 1 capsule (20 mg total) by mouth every morning.     • Needle, Disp, 18G X 1\" Does not apply Misc Use for testosterone  each 1   • Syringe/Needle, Disp, (BD LUER-IDANIA SYRINGE) 25G X 1\" 3 ML Does not apply Misc To inject testosterone IM q 2 weeks 10 each 1   • Syringe/Needle, Disp, (BD LUER-LOCK SYRINGE) 18G X 1-1/2\" 3 ML Does not apply Misc 1 injection every 14 days 50 each 0   • Syringe/Needle, Disp, (SYRINGE LUER LOCK) 25G X 1-1/2\" 3 ML Does not apply Misc 1 each every 14 (fourteen) days. Pt using terumo luer lock syringes 100 each 0   • aspirin 81 MG Oral Tab Take 1 tablet (81 mg total) by mouth daily.     • FLUTICASONE PROPIONATE, NASAL, (FLONASE) 50 MCG/ACT Nasal Suspension daily     • Fexofenadine HCl (ALLEGRA) 180 MG Oral Tab Take  by mouth daily as needed.     [2]  Allergies  Allergen Reactions   • Dust    • Grass    • Losartan      Lightheaded,sob,muscle weakness   • Mold    • Penicillins RASH   • Reglan [Metoclopramide] OTHER (SEE COMMENTS)     Pt starts to feel hot and flushed and trouble breathing   • Seasonal OTHER (SEE COMMENTS)     Sinus congestion

## 2025-06-02 NOTE — PATIENT INSTRUCTIONS
Refill policies:    Allow 2-3 business days for refills; controlled substances may take longer.  Contact your pharmacy at least 5 days prior to running out of medication and have them send an electronic request or submit request through the “request refill” option in your myOrder account.  Refills are not addressed on weekends; covering physicians do not authorize routine medications on weekends.  No narcotics or controlled substances are refilled after noon on Fridays or by on call physicians.  By law, narcotics must be electronically prescribed.  A 30 day supply with no refills is the maximum allowed.  If your prescription is due for a refill, you may be due for a follow up appointment.  To best provide you care, patients receiving routine medications need to be seen at least once a year.  Patients receiving narcotic/controlled substance medications need to be seen at least once every 3 months.  In the event that your preferred pharmacy does not have the requested medication in stock (e.g. Backordered), it is your responsibility to find another pharmacy that has the requested medication available.  We will gladly send a new prescription to that pharmacy at your request.    Scheduling Tests:    If your physician has ordered radiology tests such as MRI or CT scans, please contact Central Scheduling at [phone] right away to schedule the test.  Once scheduled, the [facility] Centralized Referral Team will work with your insurance carrier to obtain pre-certification or prior authorization.  Depending on your insurance carrier, approval may take 3-10 days.  It is highly recommended patients assure they have received an authorization before having a test performed.  If test is done without insurance authorization, patient may be responsible for the entire amount billed.      Precertification and Prior Authorizations:  If your physician has recommended that you have a procedure or additional testing performed the [facility]  Centralized Referral Team will contact your insurance carrier to obtain pre-certification or prior authorization.    You are strongly encouraged to contact your insurance carrier to verify that your procedure/test has been approved and is a COVERED benefit.  Although the [facility] Centralized Referral Team does its due diligence, the insurance carrier gives the disclaimer that \"Although the procedure is authorized, this does not guarantee payment.\"    Ultimately the patient is responsible for payment.   Thank you for your understanding in this matter.  Paperwork Completion:  If you require FMLA or disability paperwork for your recovery, please make sure to either drop it off or have it faxed to our office at [phone]. Be sure the form has your name and date of birth on it.  The form will be faxed to our Forms Department and they will complete it for you.  There is a 25$ fee for all forms that need to be filled out.  Please be aware there is a 10-14 day turnaround time.  You will need to sign a release of information (MARLIN) form if your paperwork does not come with one.  You may call the Forms Department with any questions at [phone].  Their fax number is [phone].

## 2025-06-05 ENCOUNTER — TELEPHONE (OUTPATIENT)
Dept: PAIN CLINIC | Facility: CLINIC | Age: 55
End: 2025-06-05

## 2025-06-05 DIAGNOSIS — M54.16 LUMBAR RADICULITIS: Primary | ICD-10-CM

## 2025-06-05 NOTE — TELEPHONE ENCOUNTER
Prior authorization request completed for: BARNEY  Authorization # [id number]  Authorization dates: 6/5/25-12/2/25  CPT codes approved: 29719  Number of visits/dates of service approved: 1  Physician: codey  Location: [facility]     Patient can be scheduled. Routed to Navigator.

## 2025-06-09 NOTE — TELEPHONE ENCOUNTER
Patient requested to switch to Local.    Patient advised of insurance approval to proceed with injections and is agreeable to scheduling.  pre-procedure instructions reviewed. Patient prefers Local sedation. Reviewed sedation instructions including No Fasting & No  Required. Patient encouraged but not required to hold ASA 81 mg for 24 hours prior to procedure. Patient verbalized understanding of instructions, no further needs at this time.       [facility]  PRE-PROCEDURE INSTRUCTIONS WITHOUT SEDATION    Procedure: LESI       Appointment Date: 06/16/2025  Check-In Time: 08:00 AM    Follow-Up Date/Time: Patient will call back to schedule follow up if necessary.    Prior to the procedure:  Please update us prior to the procedure if you are experiencing any symptoms of infection such as cough, fever, chills, urinary symptoms, or have recently been prescribed antibiotics, have open wounds, have recently had surgery or dental procedures.    Day of Procedure:  **Drivers will be required for patients who receive prescriptions for Valium.    NO FASTING REQUIRED  Please bring your Insurance Card, Photo ID, List of Current Medications and Referral (if applicable) to your appointment.  Please park in the [facility] [employer] and follow the signs to the \[facility] [place] [facility]".  Check in at [facility] ([address] [place] [address]) outpatient registration in the \[facility]\"".  Please note-No prescriptions will be written by Pain Clinic in [place] on the day of procedure. If you require a refill of medications, please contact the office 48 hours prior to your procedure.  If you have an implanted Spinal Cord or Peripheral Nerve Stimulator: Please remember to turn device off for procedure.        Medication Hold:    Number of days you need to be off for the following medications:    Aggrenox 10 days   Agrylin (Anagrelide) 10 days  Brilinta (Ticagrelor) 7 days  Imbruvica (Ibrutinib) 3 days   Enbrel (Etanercept) 24  hours   Fragmin (Dalteparin) 24 hours   Pletal (Cilostazol) 7 days  Effient (Prasugrel) 7 days  Pradaxa 10 days  Trental 7 days  Eliquis (Apixaban) 3 days  Xarelto (Rivaroxaban) 3 days  Lovenox (Enoxaparin) 24 hours  Aspirin  Greater than 81mg but less than 325mg   5 days  325mg and greater                  7 days  Coumadin       5 days  Procedure may be cancelled if INR is elevated.   Excedrin (with aspirin) 7 days  Plavix (Clopidogrel)                            7 days    NSAIDs: 24 hours preferred      Ibuprofen (Motrin, Advil, Vicoprofen), Naproxen (Naprosyn, Aleve), Piroxcam (Feldene), Meloxicam (Mobic), Oxaprozin (Daypro), Diclofenac (Voltaren), Indomethacin (Indocin), Etodolac (Lodine), Nabumetone (Relafen), Celebrex (Celecoxib)           HERBAL SUPPLEMENTS  5 days preferred  Fish oil, krill oil, Omega-3, Vascepa, Vitamin E, Turmeric, Garlic                       Insurance Authorization:   Most insurances are now requiring a preauthorization for all procedures.  In the event that your insurance does not authorize your procedure within 48 hours of the scheduled date, your procedure will be cancelled and rescheduled to a later date.  Please contact your insurance carrier to determine what your financial responsibility will be for the procedure(s).      Cancellation/Rescheduling Appointment:   In the event you need to cancel or reschedule your appointment, you must notify the office 24 hours prior.    Post-procedure instructions:        Please schedule a follow up visit within 2 to 4 weeks after your last procedure date   Please call our office with any questions or concerns before or after your procedure at  [phone].  If you are a diabetic, please increase the frequency of your glucose monitoring after the procedure as this may cause a temporary increase in your blood sugar.  Contact your primary care physician if your blood sugar rises as you may require some medication adjustment.  It is normal to have  increased pain at injection site for up to 3-5 days after procedure, you can use heat or ice (20 minutes on 20 minutes off) for comfort.    **To hear a recorded version of these instructions, please call [phone] and follow the prompts.  **Para escuchar las instrucciones en Español, por favor de llamar el karolyn [phone] opción 4.

## 2025-06-12 ENCOUNTER — TELEPHONE (OUTPATIENT)
Dept: PHYSICAL THERAPY | Facility: HOSPITAL | Age: 55
End: 2025-06-12

## 2025-06-13 ENCOUNTER — OFFICE VISIT (OUTPATIENT)
Dept: PHYSICAL THERAPY | Age: 55
End: 2025-06-13
Payer: COMMERCIAL

## 2025-06-13 DIAGNOSIS — M54.16 CHRONIC RIGHT-SIDED LUMBAR RADICULOPATHY: Primary | ICD-10-CM

## 2025-06-13 PROCEDURE — 97162 PT EVAL MOD COMPLEX 30 MIN: CPT

## 2025-06-13 PROCEDURE — 97140 MANUAL THERAPY 1/> REGIONS: CPT

## 2025-06-13 PROCEDURE — 97110 THERAPEUTIC EXERCISES: CPT

## 2025-06-13 NOTE — PROGRESS NOTES
SPINE EVALUATION:     Diagnosis:   Chronic right-sided lumbar radiculopathy Patient:  Lionel Velazqeuz (55 year old, male)        Referring Provider: Malu Rendon  Today's Date   6/13/2025    Precautions:     MS Date of Evaluation: 06/13/25  Next MD visit: No data recorded  Date of Surgery: No data recorded     PATIENT SUMMARY     Summary of chief complaints: R sided lower back/radicular symptoms  History of current condition: Pt. states he had a few consultations with ortho specialist and they are considering surgery if conservative treatment fails. He will get an injection in the future. Pt. reports increase in symptoms within the last 10 months. 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 Pt. diagnosed with MS in 2017. Eyesight is main thing with MS and his sensation in his feet can change randomly. Pt. notices after 3-4 alcoholic drinks he can have diarrhea.   Pain level: current  5/10, at best  4/10, at worst  8/10  Description of symptoms: R sided R lower back pain down to posterior thigh and down to the R front testicle   Occupation: sedentary desk job   Leisure activities/Hobbies: lifting, working out   Prior level of function:  independent  Current limitations: standing, walking, clumsiness (unrelated to lower back per pt. report), lifting for fitness  Pt goals:  to be painfree with lifting and working out.  Red flag signs/symptoms: Pt denies dizziness, drop attacks, dysphagia, dysarthria, diplopia; Pt denies changes in bowel/bladder function, saddle anesthesia; Pt denies pain that wakes in sleep, fever, recent trauma, history of CA, pain unchanged with movement/activity    Past medical history was reviewed with Lionel.  Significant findings include: MS  Imaging/Tests: MRI: recommend surgery if conservative treatment does not work; signficiant findings per pt. report   Lionel  has a past medical history of Abdominal hernia, Abdominal pain (July), Anxiety, Arthritis, Back pain  (September), Bloating, Body piercing (1984), Chronic rhinitis, Diarrhea, unspecified (July), Heartburn, High cholesterol, Hyperlipidemia (01/01/2015), HYPOTHYROIDISM, Hypothyroidism (2005), Multiple sclerosis (HCC), Obesity, Other abnormal glucose, OTHER DISEASES, Pain in joints, Shingles, Sleep apnea (no idea), Sleep disturbance, Thyroid disease, Unspecified essential hypertension, Wears glasses, and Weight gain.  He  has a past surgical history that includes other surgical history (09/2008); hernia surgery (8/2007,11/2009); arthroscopy of joint unlisted; and colonoscopy.    ASSESSMENT  Lionel presents to physical therapy evaluation with primary c/o R sided lower back/radicular symptoms. The results of the objective tests and measures show weakness in LE, R>L LE neurotension, and mobility deficits in lumbar spine/hips contributing to current presentation of symptoms. Functional deficits include but are not limited to standing, walking, clumsiness (unrelated to lower back per pt. report), lifting for fitness. Signs and symptoms are consistent with diagnosis of Chronic right-sided lumbar radiculopathy. Pt and PT discussed evaluation findings, pathology, POC and HEP.  Pt voiced understanding and performs HEP correctly without reported pain. Skilled Physical Therapy is medically necessary to address the above impairments and reach functional goals.    OBJECTIVE:      Musculoskeletal:  Observation/Posture:   Decreased sagital plane mobility  Accessory Motion:  did not assess lumbar segments this session to avoid doing too many palpation assessments in one visit; to be assessed at a later visit   Palpation:   Increased tone along lumbar paraspinals, trigger point in R QL attachment, bilat gluteal restrictions; limited passive hip IR bilaterally    Special tests:   Toe walk L>R weakness no pain; heel walk brought on tension in R lower glute but wnl; R neurotension present    ROM and Strength:  (* denotes performed with  pain)  Knee   ROM MMT (-/5)    R L R L     Flex             Ext (L3)     5/5 4-/5     ,   Ankle/Foot   ROM MMT (-/5)    R L R L     PF             DF (L4)     5/5 4/5          Today's Treatment and Response:   Pt education was provided on exam findings, treatment diagnosis, treatment plan, expectations, and prognosis.    Today's Treatment       6/13/2025   Spine Treatment   Therapeutic Exercise 2x60 sec prone pressups  2x10 prone pressups  2x10 prone ER/IR   Neuro Re-Education 2x10 glute squeezes in prone bilat/isolated   Manual Therapy 23 min STM R>L QL, thoracolumbar PS, glutes  3 min hip IR R>L   Therapeutic Exercise Minutes 10   Neuro Re-Educ Minutes 4   Manual Therapy Minutes 23   Evaluation Minutes 15   Total Time Of Timed Procedures 37   Total Time Of Service-Based Procedures 15   Total Treatment Time 52   HEP 2x60 sec prone pressups  2x10 prone pressups  2x10 prone ER/IR  2x10 glute squeezes in prone bilat/isolated        Patient was instructed in and issued a HEP for: 2x60 sec prone pressups  2x10 prone pressups  2x10 prone ER/IR  2x10 glute squeezes in prone bilat/isolated    Charges:  PT EVAL: Moderate Complexity, 1 therex 2 manual  In agreement with evaluation findings and clinical rationale, this evaluation involved MODERATE COMPLEXITY decision making due to 1-2 personal factors/comorbidities, 3 or more body structures involved/activity limitations, and evolving symptoms as documented in the evaluation.                                                                         PLAN OF CARE:      Goals: (to be met in 10 visits)   Pt. To have DL of less than 15% disability.  Pt. To have WNL toe walking to ensure proper nerve signaling in LE to prevent risk of falling while walking.  Pt. To demonstrate 10 floor to waist transfers using 20 lbs and proper squatting mechanics to ensure safety when picking up heavier groceries.  Pt. To improve lumbar extension by 25% to ensure maintenance of upright posture with  prolonged standing at home while doing dishes.  Pt. To be independent with home exercise program.     Frequency / Duration: Patient will be seen 2x/week or a total of 10  visits over a 90 day period. Treatment will include: Gait training; Therapeutic Activities; Therapeutic Exercise; Manual Therapy; Neuromuscular Re-education; Home Exercise Program instruction; Patient/Family Education    Education or treatment limitation: Compliance   Rehab Potential: good     Oswestry Disability Index Score  Score: (Patient-Rptd) 34 % (6/6/2025  8:37 AM)      Patient/Family/Caregiver was advised of these findings, precautions, and treatment options and has agreed to actively participate in planning and for this course of care.    Thank you for your referral. Please co-sign or sign and return this letter via fax as soon as possible to [phone]. If you have any questions, please contact me at Dept: [phone]    Sincerely,  Electronically signed by therapist: Dolly Whaley PT  Physician's certification required: Yes  I certify the need for these services furnished under this plan of treatment and while under my care.    X___________________________________________________ Date____________________    Certification From: 6/13/2025  To: 9/11/2025

## 2025-06-16 ENCOUNTER — APPOINTMENT (OUTPATIENT)
Dept: GENERAL RADIOLOGY | Facility: HOSPITAL | Age: 55
End: 2025-06-16
Attending: ANESTHESIOLOGY
Payer: COMMERCIAL

## 2025-06-16 ENCOUNTER — HOSPITAL ENCOUNTER (OUTPATIENT)
Facility: HOSPITAL | Age: 55
Setting detail: HOSPITAL OUTPATIENT SURGERY
Discharge: HOME | End: 2025-06-16
Attending: ANESTHESIOLOGY | Admitting: ANESTHESIOLOGY
Payer: COMMERCIAL

## 2025-06-16 VITALS
RESPIRATION RATE: 16 BRPM | HEART RATE: 70 BPM | HEIGHT: 68 IN | WEIGHT: 230 LBS | OXYGEN SATURATION: 96 % | SYSTOLIC BLOOD PRESSURE: 139 MMHG | DIASTOLIC BLOOD PRESSURE: 100 MMHG | BODY MASS INDEX: 34.86 KG/M2 | TEMPERATURE: 98 F

## 2025-06-16 PROCEDURE — 62323 NJX INTERLAMINAR LMBR/SAC: CPT | Performed by: ANESTHESIOLOGY

## 2025-06-16 RX ORDER — SODIUM CHLORIDE 9 MG/ML
INJECTION, SOLUTION INTRAMUSCULAR; INTRAVENOUS; SUBCUTANEOUS
Status: DISCONTINUED | OUTPATIENT
Start: 2025-06-16 | End: 2025-06-16

## 2025-06-16 RX ORDER — IOPAMIDOL 408 MG/ML
INJECTION, SOLUTION INTRATHECAL
Status: DISCONTINUED | OUTPATIENT
Start: 2025-06-16 | End: 2025-06-16

## 2025-06-16 RX ORDER — LIDOCAINE HYDROCHLORIDE 10 MG/ML
INJECTION, SOLUTION EPIDURAL; INFILTRATION; INTRACAUDAL; PERINEURAL
Status: DISCONTINUED | OUTPATIENT
Start: 2025-06-16 | End: 2025-06-16

## 2025-06-16 RX ORDER — NALOXONE HYDROCHLORIDE 0.4 MG/ML
0.08 INJECTION, SOLUTION INTRAMUSCULAR; INTRAVENOUS; SUBCUTANEOUS AS NEEDED
Status: DISCONTINUED | OUTPATIENT
Start: 2025-06-16 | End: 2025-06-16

## 2025-06-16 RX ORDER — DEXAMETHASONE SODIUM PHOSPHATE 10 MG/ML
INJECTION, SOLUTION INTRAMUSCULAR; INTRAVENOUS
Status: DISCONTINUED | OUTPATIENT
Start: 2025-06-16 | End: 2025-06-16

## 2025-06-16 NOTE — H&P
History & Physical Examination    Patient Name: Lionel Velazquez  MRN: [id number]  CSN: [id number]  YOB: 1970    Pre-Operative Diagnosis:  Lumbar radiculitis [M54.16]    Present Illness: Lumbar radiculitis    ASA: 2  MP class: 1  Sedation: Local     Prescriptions Prior to Admission[1]  Current Hospital Medications[2]    Allergies: Allergies[3]    Past Medical History[4]  Past Surgical History[5]  Family History[6]  Social History     Tobacco Use    Smoking status: Former     Current packs/day: 0.00     Average packs/day: 1 pack/day for 20.0 years (20.0 ttl pk-yrs)     Types: Cigarettes     Start date: 1980     Quit date: 2000     Years since quittin.3    Smokeless tobacco: Never   Substance Use Topics    Alcohol use: Yes     Alcohol/week: 3.0 standard drinks of alcohol     Types: 3 Standard drinks or equivalent per week     Comment: SOCIAL, 2-3 per week       SYSTEM Check if Review is Normal Check if Physical Exam is Normal If not normal, please explain:   HEENT [x ] [x ]    NECK & BACK [x ] [x ]    HEART [x ] [x ]    LUNGS [x ] [x ]    ABDOMEN [x ] [x ]    UROGENITAL [x ] [x ]    EXTREMITIES [x ] [x ]    OTHER        [ x ] I have discussed the risks and benefits and alternatives with the patient/family.  They understand and agree to proceed with plan of care.  [ x ] I have reviewed the History and Physical done within the last 30 days.  Any changes noted above.    Stefano Nails MD              [1]   Medications Prior to Admission   Medication Sig Dispense Refill Last Dose/Taking    clonazePAM 1 MG Oral Tab Take 1 tablet (1 mg total) by mouth nightly as needed for Anxiety. 30 tablet 3     VUMERITY 231 MG Oral Capsule Delayed Release TAKE 2 CAPSULES TWICE A  capsule 0     LEVOTHYROXINE 88 MCG Oral Tab Take 1 tablet (88 mcg total) by mouth before breakfast. 90 tablet 1     azelastine 137 MCG/SPRAY Nasal Solution USE 1 SPRAY INTO EACH NOSTRIL TWICE A DAY 1 each 1     MIEBO 1.338  GM/ML Ophthalmic Solution        AMLODIPINE 10 MG Oral Tab TAKE 1 TABLET BY MOUTH EVERY DAY 90 tablet 0     TESTOSTERONE CYPIONATE 200 mg/mL Intramuscular Solution INJECT 1ML INTO THE MUSCLE EVERY 14 DAYS 6 mL 1     atorvastatin 40 MG Oral Tab TAKE 1 TABLET BY MOUTH EVERY DAY 90 tablet 1     Triamterene-HCTZ 37.5-25 MG Oral Tab Take 1 tablet by mouth daily. 90 tablet 0     omeprazole 20 MG Oral Capsule Delayed Release Take 1 capsule (20 mg total) by mouth every morning.       Needle, Disp, 18G X 1\" Does not apply Misc Use for testosterone  each 1     Syringe/Needle, Disp, (BD LUER-IDANIA SYRINGE) 25G X 1\" 3 ML Does not apply Misc To inject testosterone IM q 2 weeks 10 each 1     Syringe/Needle, Disp, (BD LUER-LOCK SYRINGE) 18G X 1-1/2\" 3 ML Does not apply Misc 1 injection every 14 days 50 each 0     Syringe/Needle, Disp, (SYRINGE LUER LOCK) 25G X 1-1/2\" 3 ML Does not apply Misc 1 each every 14 (fourteen) days. Pt using terumo luer lock syringes 100 each 0     aspirin 81 MG Oral Tab Take 1 tablet (81 mg total) by mouth in the morning.   6/12/2025    FLUTICASONE PROPIONATE, NASAL, (FLONASE) 50 MCG/ACT Nasal Suspension daily       Fexofenadine HCl (ALLEGRA) 180 MG Oral Tab Take  by mouth daily as needed.      [2]   Current Facility-Administered Medications   Medication Dose Route Frequency    dexAMETHasone PF (Decadron) 10 MG/ML injection    PRN    sodium chloride 0.9% PF injection    PRN   [3]   Allergies  Allergen Reactions    Dust     Grass     Losartan      Lightheaded,sob,muscle weakness    Mold     Penicillins RASH    Reglan [Metoclopramide] OTHER (SEE COMMENTS)     Pt starts to feel hot and flushed and trouble breathing    Seasonal OTHER (SEE COMMENTS)     Sinus congestion   [4]   Past Medical History:   Abdominal hernia    Abdominal pain    After having a few drinks    Anxiety    Arthritis    Back pain    Lower right side    Bloating    Body piercing    left ear    Chronic rhinitis    Diarrhea, unspecified     After having a couple of drinks. Lasts about 3 days    Heartburn    High cholesterol    Hyperlipidemia    HYPOTHYROIDISM    Hypothyroidism    hashimoto's    Multiple sclerosis (HCC)    Obesity    Fat but loosing weight    Other abnormal glucose    Loss of peripheral vision    OTHER DISEASES    hypogonadism    Pain in joints    Shingles    [place]    Sleep apnea    Sleep disturbance    Thyroid disease    Unspecified essential hypertension    Wears glasses    Weight gain   [5]   Past Surgical History:  Procedure Laterality Date    Arthroscopy of joint unlisted      yoav shoulder    Colonoscopy      Hernia surgery  8/2007,11/2009    x3    Other surgical history  09/2008    SINUS x2   [6]   Family History  Problem Relation Age of Onset    Cancer Father         GE junction    Alcohol and Other Disorders Associated Father     Colon Cancer Father     Hypertension Father     Lipids Father     Heart Disorder Father     Cancer Maternal Grandmother         breast    Breast Cancer Maternal Grandmother         Grandmother on moms side    Heart Disorder Maternal Grandfather     Cancer Maternal Grandfather     Heart Attack Maternal Grandfather     Heart Attack Paternal Grandmother     Heart Disorder Paternal Grandmother     Cancer Paternal Grandfather     Cancer Paternal Uncle     Lipids Brother

## 2025-06-16 NOTE — DISCHARGE INSTRUCTIONS
Home Care Instructions Following Your Pain Procedure     Lionel,  It has been a pleasure to have you as our patient. To help you at home, you must follow these general discharge instructions. We will review these with you before you are discharged. It is our hope that you have a complete and uneventful recovery from our procedure.     General Instructions:  What to Expect:  Bandages from your procedure today can be removed when you get home.  Please avoid soaking and/or swimming for 24 hours.  Showering is okay  It is normal to have increased pain symptoms and/or pain at injection site for up to 3-5 days after procedure, you can use heat or ice (20 minutes on 20 minutes off) for comfort.  You may experience some temporary side effects which may include restlessness or insomnia, flushing of the face, or heart palpitations.  Please contact the provider if these symptoms do not resolve within 3-4 days.  Lightheadedness or nausea may occur and should resolve within 24 to 48 hours.  If you develop a headache after treatment, rest, drink fluids (with caffeine, if possible) and take mild over-the-counter pain medication.  If the headache does not improve with the above treatment, contact the physician.  Home Medications:  Resume all previously prescribed medication.  Please avoid taking NSAIDs (Non-Steriodal Anti-Inflammatory Drugs) such as:  Ibuprofen ( Advil, Motrin) Aleve (Naproxen), Diclofenac, Meloxicam for 6 hours after procedure.   If you are on Coumadin (Warfarin) or any other anti-coagulant (or \"blood thinning\") medication such as Plavix (Clopidogrel), Xarelto (Rivaroxaban), Eliquis (Apixaban), Effient (Prasugrel) etc., restart on the following day from the procedure unless otherwise directed by your provider.  If you are a diabetic, please increase the frequency of your glucose monitoring after the procedure as steroids may cause a temporary (2-3 day) increase in your blood sugar.  Contact your primary care  physician if your blood sugar remains elevated as you may require some medication adjustment.  Diet:  Resume your regular diet as tolerated.  Activity:  We recommend that you relax and rest during the rest of your procedure day.  If you feel weakness in your arms or legs do not drive.  Follow-up Appointment  Please schedule a follow-up visit within 3 to 4 weeks after your last procedure date.  Question or Concerns:  Feel free to call our office with any questions or concerns at [phone] (option #2)    Lionel  Thank you for coming to [facility] for your procedure.  The nurses try very hard to make sure you receive the best care possible.  Your trust in them as well as us is greatly appreciated.    Thanks so much,   Dr. Stefano Nails

## 2025-06-16 NOTE — OPERATIVE REPORT
[facility]  Operative Report  2025     Lionel Velazquez Patient Status:  Hospital Outpatient Surgery    1970 MRN [id number]   Location [facility]IN [facility] Attending Stefano Nails MD   Hosp Day # 0 PCP ESTELLE REA,      Indication: Lionel is a 55 year old male with lumbar radiculitis    Preoperative Diagnosis:  Lumbar radiculitis [M54.16]    Postoperative Diagnosis: Same as above.    Procedure performed: LUMBAR INTERLAMINAR EPIDURAL INJECTION with local    Anesthesia: Local     EBL: Less than 1 ml.    Procedure Description:  After reviewing the patient's history and performing a focused physical examination, the diagnosis and positive previous diagnostic tests were confirmed and contraindications such as infection and coagulopathy were ruled out.  Following review of allergies and potential side effects and complications, including but not necessarily limited to infection, allergic reaction, local tissue breakdown, nerve injury, post-dural puncture headache and paresis, the patient consented for the procedure.    The patient was brought to the procedure room in prone position. After sterile prep of the lumbar spine, the L4-L5 interspace was identified with the help of fluoroscopy. Local anesthetic was given by a 25 gauge 1.5 inch needle with 1% lidocaine in that space level.  Thereafter, a 20 gauge Tuohy needle was introduced and advanced under fluoroscopy.  The epidural space was accessed by using loss of resistance to air technique.  The needle position was confirmed with AP and lateral view under fluoroscopy.  Omnipaque 180 was injected in 1 mL volume. A good epidurogram was obtained.  Thereafter, dexamethasone 10 mg with normal saline of 5 mL in total volume of 6 mL was injected through the Tuohy needle.  The needle was flushed with 1 mL lidocaine.  The needle was withdrawn with the stylet intact in situ.  The needle's tip was intact.  The patient tolerated the  procedure very well without significant immediate complication.  The patient's back was cleaned and sterile dressing was applied.  The patient was discharged with an instruction to a responsible adult after discharge criteria were met.  The patient was advised to contact us should any problems happen.  The patient was also informed to go to the emergency room immediately if experiencing severe numbness or weakness in the extremities or experiencing bowel or bladder incontinence.            Complications: None.    Follow up: The patient was followed in the pain clinic as needed basis.          Stefano Nails MD

## 2025-06-17 ENCOUNTER — HOSPITAL ENCOUNTER (OUTPATIENT)
Dept: MRI IMAGING | Facility: HOSPITAL | Age: 55
Discharge: HOME | End: 2025-06-17
Attending: Other
Payer: COMMERCIAL

## 2025-06-17 ENCOUNTER — TELEPHONE (OUTPATIENT)
Dept: PAIN CLINIC | Facility: CLINIC | Age: 55
End: 2025-06-17

## 2025-06-17 DIAGNOSIS — G35 RELAPSING REMITTING MULTIPLE SCLEROSIS (HCC): ICD-10-CM

## 2025-06-17 PROCEDURE — 70553 MRI BRAIN STEM W/O & W/DYE: CPT | Performed by: OTHER

## 2025-06-17 PROCEDURE — A9575 INJ GADOTERATE MEGLUMI 0.1ML: HCPCS | Performed by: OTHER

## 2025-06-17 PROCEDURE — 72156 MRI NECK SPINE W/O & W/DYE: CPT | Performed by: OTHER

## 2025-06-17 PROCEDURE — 72157 MRI CHEST SPINE W/O & W/DYE: CPT | Performed by: OTHER

## 2025-06-17 RX ORDER — GADOTERATE MEGLUMINE 376.9 MG/ML
20 INJECTION INTRAVENOUS
Status: COMPLETED | OUTPATIENT
Start: 2025-06-17 | End: 2025-06-17

## 2025-06-17 RX ADMIN — GADOTERATE MEGLUMINE 20 ML: 376.9 INJECTION INTRAVENOUS at 17:06:00

## 2025-06-17 NOTE — TELEPHONE ENCOUNTER
Courtesy called placed to patient for post procedure follow up. Patient stated he did not sleep but overall his pain is better than before. Pt verbalized understanding to call with any questions or concerns.      Procedure: LUMBAR INTERLAMINAR EPIDURAL INJECTION with local   Date: 6/16/25  Follow up Visit Scheduled: None on file    Patient declined to make an appointment at this time as he traveling to [place] and he will be there for a few months

## 2025-06-18 ENCOUNTER — PATIENT MESSAGE (OUTPATIENT)
Dept: NEUROLOGY | Facility: CLINIC | Age: 55
End: 2025-06-18

## 2025-06-22 DIAGNOSIS — I10 ESSENTIAL HYPERTENSION WITH GOAL BLOOD PRESSURE LESS THAN 130/80: ICD-10-CM

## 2025-06-23 ENCOUNTER — OFFICE VISIT (OUTPATIENT)
Dept: PHYSICAL THERAPY | Age: 55
End: 2025-06-23
Payer: COMMERCIAL

## 2025-06-23 PROCEDURE — 97110 THERAPEUTIC EXERCISES: CPT

## 2025-06-23 PROCEDURE — 97140 MANUAL THERAPY 1/> REGIONS: CPT

## 2025-06-24 NOTE — PROGRESS NOTES
Patient: Lionel Velazquez (55 year old, male) Referring Provider:  Insurance:   Diagnosis: Chronic right-sided lumbar radiculopathy Malu NOLASCO   Date of Surgery: No data recorded Next MD visit:  N/A   Precautions:  None No data recorded Referral Information:    Date of Evaluation: Req: 0, Auth: 0, Exp:     06/13/25 POC Auth Visits:          Today's Date   6/23/2025    Subjective  Pt. reports he had his lumbar injection on Monday. He felt good but things flared up after work. Pt. reports walking he has isolated R glute pain. The shot helped the shooting pain into the R testicle area.       Pain: 6/10     Objective  R gluteal trigger point            Assessment  Progressed clinical exercises today and updated HEP. Discussed plan of care and pt. stated he might want to discharge pending how he feels after being out of town. Pt. had core weakness but minimal pain during completion of exercises. Pt. may want to try HEP independently then follow up with MD for next steps in plan of care.    Goals (to be met in 10 visits)   Pt. To have DL of less than 15% disability.  Pt. To have WNL toe walking to ensure proper nerve signaling in LE to prevent risk of falling while walking.  Pt. To demonstrate 10 floor to waist transfers using 20 lbs and proper squatting mechanics to ensure safety when picking up heavier groceries.  Pt. To improve lumbar extension by 25% to ensure maintenance of upright posture with prolonged standing at home while doing dishes.  Pt. To be independent with home exercise program.       Plan  Continue per plan of care; pt. may want to discharge?    Treatment Last 4 Visits  Treatment Day: 2       6/13/2025 6/23/2025   Spine Treatment   Therapeutic Exercise 2x60 sec prone pressups  2x10 prone pressups  2x10 prone ER/IR 2x60 sec prone pressups   3x10 prone pressups   2x10 prone ER/IR   2x10 prone hip extension    2x10 s/l hip abduction       Neuro Re-Education 2x10 glute squeezes in prone  bilat/isolated 2x10 glute squeezes in prone bilat/isolated  2x10 bird dogs   Manual Therapy 23 min STM R>L QL, thoracolumbar PS, glutes  3 min hip IR R>L 20 min STM R>L QL, thoracolumbar PS, glutes  3 min hip IR R>L       Therapeutic Exercise Minutes 10 17   Neuro Re-Educ Minutes 4 4   Manual Therapy Minutes 23 23   Evaluation Minutes 15    Total Time Of Timed Procedures 37 44   Total Time Of Service-Based Procedures 15 0   Total Treatment Time 52 44   HEP 2x60 sec prone pressups  2x10 prone pressups  2x10 prone ER/IR  2x10 glute squeezes in prone bilat/isolated         HEP  2x60 sec prone pressups  2x10 prone pressups  2x10 prone ER/IR  2x10 glute squeezes in prone bilat/isolated    Charges  1 therex 2 manual

## 2025-06-24 NOTE — TELEPHONE ENCOUNTER
Last Office Visit: 03/12/2025    Last Refill:   Medication Quantity Refills Start End   AMLODIPINE 10 MG Oral Tab 90 tablet 0 3/3/2025 --     Return to Clinic: 07/02/2025    Protocol: failed    Please call patient to schedule blood pressure follow up then route to Dr. De Leon    Refill pended. Please approve if okay. Thank you.

## 2025-06-25 RX ORDER — AMLODIPINE BESYLATE 10 MG/1
10 TABLET ORAL DAILY
Qty: 90 TABLET | Refills: 0 | Status: SHIPPED | OUTPATIENT
Start: 2025-06-25

## 2025-06-26 ENCOUNTER — OFFICE VISIT (OUTPATIENT)
Dept: FAMILY MEDICINE CLINIC | Facility: CLINIC | Age: 55
End: 2025-06-26
Payer: COMMERCIAL

## 2025-06-26 VITALS
TEMPERATURE: 98 F | HEART RATE: 82 BPM | SYSTOLIC BLOOD PRESSURE: 126 MMHG | BODY MASS INDEX: 35.46 KG/M2 | RESPIRATION RATE: 16 BRPM | WEIGHT: 234 LBS | HEIGHT: 68 IN | DIASTOLIC BLOOD PRESSURE: 78 MMHG

## 2025-06-26 DIAGNOSIS — E06.3 HYPOTHYROIDISM DUE TO HASHIMOTO'S THYROIDITIS: ICD-10-CM

## 2025-06-26 DIAGNOSIS — E78.2 MIXED HYPERLIPIDEMIA: ICD-10-CM

## 2025-06-26 DIAGNOSIS — E29.1 MALE HYPOGONADISM: ICD-10-CM

## 2025-06-26 DIAGNOSIS — I10 ESSENTIAL HYPERTENSION WITH GOAL BLOOD PRESSURE LESS THAN 130/80: Primary | ICD-10-CM

## 2025-06-26 DIAGNOSIS — F41.1 GENERALIZED ANXIETY DISORDER: ICD-10-CM

## 2025-06-26 PROCEDURE — 99214 OFFICE O/P EST MOD 30 MIN: CPT | Performed by: FAMILY MEDICINE

## 2025-06-26 NOTE — PROGRESS NOTES
[facility] Office Note  Chief Complaint:   Chief Complaint   Patient presents with    Follow - Up     B/p check        HPI:   This is a 55 year old male coming in for:    Low testosterone - recent testosterone level is normal.  He is taking 200mg IM every 14 days.  Denies any side effects.  Feels energy levels are stable.  NICHOLE - stable.  Taking klonopin PRN.  Denies any side effects.  No recent panic attacks.  HTN - Patient presents for recheck of his hypertension. Pt has been taking medications as instructed, no medication side effects, home BP monitoring in the range of 120's systolic and 80's diastolic.  HL - taking atorvastatin.  Well controlled.  No side effects including myalgias.  Denies any chest pain or SOB.  Hypothyroidism - Pt had a history of hypothyroidism and here to recheck. Has been tolerating the medication well. TSH needs repeat. Denies any shakiness, palpitations, increased BM's or wgt loss. Denies any fatigue, wgt gain.      Past Medical History:    Abdominal hernia    Abdominal pain    After having a few drinks    Anxiety    Arthritis    Back pain    Lower right side    Bloating    Body piercing    left ear    Chronic rhinitis    Diarrhea, unspecified    After having a couple of drinks. Lasts about 3 days    Heartburn    High cholesterol    Hyperlipidemia    HYPOTHYROIDISM    Hypothyroidism    hashimoto's    Multiple sclerosis (HCC)    Obesity    Fat but loosing weight    Other abnormal glucose    Loss of peripheral vision    OTHER DISEASES    hypogonadism    Pain in joints    Shingles    [place]    Sleep apnea    Sleep disturbance    Thyroid disease    Unspecified essential hypertension    Wears glasses    Weight gain     Past Surgical History:   Procedure Laterality Date    Arthroscopy of joint unlisted      yoav shoulder    Colonoscopy      Hernia surgery  8/2007,11/2009    x3    Other surgical history  09/2008    SINUS x2     Social History:  Social History      Socioeconomic History    Marital status:    Tobacco Use    Smoking status: Former     Current packs/day: 0.00     Average packs/day: 1 pack/day for 20.0 years (20.0 ttl pk-yrs)     Types: Cigarettes     Start date: 1980     Quit date: 2000     Years since quittin.4    Smokeless tobacco: Never   Vaping Use    Vaping status: Never Used   Substance and Sexual Activity    Alcohol use: Yes     Alcohol/week: 3.0 standard drinks of alcohol     Types: 3 Standard drinks or equivalent per week     Comment: SOCIAL, 2-3 per week    Drug use: Not Currently     Types: Cannabis     Comment: Cannabis like 1-2X a year max.   Other Topics Concern    Caffeine Concern Yes    Stress Concern No    Weight Concern Yes     Comment: currently loosing weight.    Special Diet No    Exercise Yes     Comment: Not often    Seat Belt Yes     Social Drivers of Health      Received from [facility]    [facility]     Family History:  Family History   Problem Relation Age of Onset    Cancer Father         GE junction    Alcohol and Other Disorders Associated Father     Colon Cancer Father     Hypertension Father     Lipids Father     Heart Disorder Father     Cancer Maternal Grandmother         breast    Breast Cancer Maternal Grandmother         Grandmother on moms side    Heart Disorder Maternal Grandfather     Cancer Maternal Grandfather     Heart Attack Maternal Grandfather     Heart Attack Paternal Grandmother     Heart Disorder Paternal Grandmother     Cancer Paternal Grandfather     Cancer Paternal Uncle     Lipids Brother      Allergies:  Allergies   Allergen Reactions    Dust     Grass     Losartan      Lightheaded,sob,muscle weakness    Mold     Penicillins RASH    Reglan [Metoclopramide] OTHER (SEE COMMENTS)     Pt starts to feel hot and flushed and trouble breathing    Seasonal OTHER (SEE COMMENTS)     Sinus congestion     Current Meds:  Current Outpatient Medications   Medication Sig Dispense Refill     amLODIPine 10 MG Oral Tab Take 1 tablet (10 mg total) by mouth daily. 90 tablet 0    clonazePAM 1 MG Oral Tab Take 1 tablet (1 mg total) by mouth nightly as needed for Anxiety. 30 tablet 3    VUMERITY 231 MG Oral Capsule Delayed Release TAKE 2 CAPSULES TWICE A  capsule 0    LEVOTHYROXINE 88 MCG Oral Tab Take 1 tablet (88 mcg total) by mouth before breakfast. 90 tablet 1    azelastine 137 MCG/SPRAY Nasal Solution USE 1 SPRAY INTO EACH NOSTRIL TWICE A DAY 1 each 1    MIEBO 1.338 GM/ML Ophthalmic Solution       TESTOSTERONE CYPIONATE 200 mg/mL Intramuscular Solution INJECT 1ML INTO THE MUSCLE EVERY 14 DAYS 6 mL 1    atorvastatin 40 MG Oral Tab TAKE 1 TABLET BY MOUTH EVERY DAY 90 tablet 1    Triamterene-HCTZ 37.5-25 MG Oral Tab Take 1 tablet by mouth daily. 90 tablet 0    omeprazole 20 MG Oral Capsule Delayed Release Take 1 capsule (20 mg total) by mouth every morning.      Needle, Disp, 18G X 1\" Does not apply Misc Use for testosterone  each 1    Syringe/Needle, Disp, (BD LUER-IDANIA SYRINGE) 25G X 1\" 3 ML Does not apply Misc To inject testosterone IM q 2 weeks 10 each 1    Syringe/Needle, Disp, (BD LUER-LOCK SYRINGE) 18G X 1-1/2\" 3 ML Does not apply Misc 1 injection every 14 days 50 each 0    Syringe/Needle, Disp, (SYRINGE LUER LOCK) 25G X 1-1/2\" 3 ML Does not apply Misc 1 each every 14 (fourteen) days. Pt using terumo luer lock syringes 100 each 0    aspirin 81 MG Oral Tab Take 1 tablet (81 mg total) by mouth in the morning.      FLUTICASONE PROPIONATE, NASAL, (FLONASE) 50 MCG/ACT Nasal Suspension daily      Fexofenadine HCl (ALLEGRA) 180 MG Oral Tab Take  by mouth daily as needed.        Counseling given: Not Answered       REVIEW OF SYSTEMS:   ROS:  CONSTITUTIONAL:  Denies any unusual weight gain/loss, fever, chills, weakness or fatigue.  HEENT:  Eyes:  Denies visual loss, blurred vision, double vision or yellow sclerae. Ears, Nose, Throat:  Denies hearing loss, sneezing, congestion, runny nose or sore  throat.  CARDIOVASCULAR:  Denies chest pain, chest pressure or chest discomfort. No palpitations or edema.  Denies any dyspnea on exertion or at rest  RESPIRATORY:  Denies shortness of breath, cough  GASTROINTESTINAL:  Denies any abdominal pain, nausea, vomiting  NEUROLOGICAL:  Denies headache, dizziness, syncope, numbness or tingling in the extremities.  MUSCULOSKELETAL:  Denies muscle, back pain, joint pain or stiffness.  PSYCHIATRIC:  Denies history of depression, + anxiety    EXAM:   /78   Pulse 82   Temp 98.2 °F (36.8 °C) (Temporal)   Resp 16   Ht 5' 8\" (1.727 m)   Wt 234 lb (106.1 kg)   BMI 35.58 kg/m²  Estimated body mass index is 35.58 kg/m² as calculated from the following:    Height as of this encounter: 5' 8\" (1.727 m).    Weight as of this encounter: 234 lb (106.1 kg).   Vital signs reviewed.Appears stated age, well groomed.  Physical Exam:  GEN:  Patient is alert and oriented x3, no apparent distress  HEAD:  Normocephalic, atraumatic  NECK:  Supple, no LAD  LUNGS: clear to auscultation bilaterally, no rales/rhonchi/wheezing  HEART:  Regular rate and rhythm, no murmurs, rubs or gallops  ABDOMEN:  Soft, nondistended, nontender, bowel sounds normal in all 4 quadrants, no hepatosplenomegaly  EXTREMITIES:  Strength intact with 5/5 bilaterally upper and lower extremities, no edema noted  NEURO:  CN 2 - 12 grossly intact     ASSESSMENT AND PLAN:   1. Low testosterone in male  -  stable  -  recheck testosterone level  -  continue testosterone IM 200mg every 14 days    2. Generalized anxiety disorder  -  stable  -  continue klonopin PRN    3. Essential hypertension with goal blood pressure less than 130/80  -  controlled  -  cpm  -  recheck renal function    4. Mixed hyperlipidemia  -  well controlled  -  recheck lipid panel  -  continue atorvastatin    5. Hypothyroidism due to Hashimoto's thyroiditis  -  recheck TSH  -  adjust synthroid accordingly      Meds & Refills for this Visit:  Requested  Prescriptions      No prescriptions requested or ordered in this encounter       Health Maintenance:  Health Maintenance Due   Topic Date Due    Pneumococcal Vaccine: Birth to 64yrs (1 of 2 - PCV) Never done    Zoster Vaccines (1 of 2) Never done    COVID-19 Vaccine (4 - 2023-24 season) 09/01/2023       Patient/Caregiver Education: Patient/Caregiver Education: There are no barriers to learning. Medical education done.   Outcome: Patient verbalizes understanding. Patient is notified to call with any questions, complications, allergies, or worsening or changing symptoms.  Patient is to call with any side effects or complications from the treatments as a result of today.     Problem List:  Patient Active Problem List   Diagnosis    Obesity    Polycythemia    Male hypogonadism    H/O Hashimoto thyroiditis    Hypertension    Special screening for malignant neoplasm of prostate    Hyperlipidemia    Hypothyroidism due to acquired atrophy of thyroid    Acute maxillary sinusitis    FH: gastric cancer    Blurry vision, left eye    Multiple sclerosis (HCC)    Testicular hypofunction    Low testosterone in male    History of adenomatous polyp of colon    Chronic sinusitis    Tubular adenoma    Benign essential hypertension    Benign prostatic hyperplasia    Diverticulosis of colon    Hypothyroidism    Incomplete right bundle branch block    Internal hemorrhoids    Lumbar radiculitis    Polycythemia vera (HCC)    Impotence of organic origin    Morbid obesity (HCC)    Relapsing remitting multiple sclerosis (HCC)    Well adult health check    Benign neoplasm of transverse colon    Benign neoplasm of sigmoid colon    Disorder of kidney and ureter    Hypogonadism in male    Polycythemia vera (HCC)    Postprocedural state    Incidental finding of severe acute respiratory syndrome coronavirus 2 (SARS-CoV-2) infection    Screening for malignant neoplasm of prostate    Abdominal pain    Abnormal feces    Chronic prostatitis    Disorder  of male genital organ    Inguinal hernia    Low back pain    Diarrhea, unspecified    Heartburn       ESTELLE REA, DO    Please note that portions of this note may have been completed with a voice recognition program. Efforts were made to edit the dictations but occasionally words are mis-transcribed.

## 2025-06-26 NOTE — PROGRESS NOTES
The following individual(s) verbally consented to be recorded using ambient AI listening technology and understand that they can each withdraw their consent to this listening technology at any point by asking the clinician to turn off or pause the recording:    Patient name: Lionel Velazquez  Additional names:

## 2025-06-27 ENCOUNTER — LAB ENCOUNTER (OUTPATIENT)
Dept: LAB | Age: 55
End: 2025-06-27
Attending: FAMILY MEDICINE
Payer: COMMERCIAL

## 2025-06-27 DIAGNOSIS — E29.1 MALE HYPOGONADISM: ICD-10-CM

## 2025-06-27 DIAGNOSIS — I10 ESSENTIAL HYPERTENSION WITH GOAL BLOOD PRESSURE LESS THAN 130/80: ICD-10-CM

## 2025-06-27 DIAGNOSIS — E06.3 HYPOTHYROIDISM DUE TO HASHIMOTO'S THYROIDITIS: ICD-10-CM

## 2025-06-27 DIAGNOSIS — D75.1 ERYTHROCYTOSIS: ICD-10-CM

## 2025-06-27 DIAGNOSIS — E78.2 MIXED HYPERLIPIDEMIA: ICD-10-CM

## 2025-06-27 LAB
ALBUMIN SERPL-MCNC: 4.5 G/DL (ref 3.2–4.8)
ALBUMIN/GLOB SERPL: 2 {RATIO} (ref 1–2)
ALP LIVER SERPL-CCNC: 51 U/L (ref 45–117)
ALT SERPL-CCNC: 45 U/L (ref 10–49)
ANION GAP SERPL CALC-SCNC: 11 MMOL/L (ref 0–18)
AST SERPL-CCNC: 31 U/L (ref ?–34)
BASOPHILS # BLD AUTO: 0.04 X10(3) UL (ref 0–0.2)
BASOPHILS NFR BLD AUTO: 0.9 %
BILIRUB SERPL-MCNC: 0.4 MG/DL (ref 0.3–1.2)
BUN BLD-MCNC: 18 MG/DL (ref 9–23)
CALCIUM BLD-MCNC: 9.3 MG/DL (ref 8.7–10.6)
CHLORIDE SERPL-SCNC: 105 MMOL/L (ref 98–112)
CHOLEST SERPL-MCNC: 149 MG/DL (ref ?–200)
CO2 SERPL-SCNC: 27 MMOL/L (ref 21–32)
CREAT BLD-MCNC: 1.16 MG/DL (ref 0.7–1.3)
EGFRCR SERPLBLD CKD-EPI 2021: 73 ML/MIN/1.73M2 (ref 60–?)
EOSINOPHIL # BLD AUTO: 0.12 X10(3) UL (ref 0–0.7)
EOSINOPHIL NFR BLD AUTO: 2.7 %
ERYTHROCYTE [DISTWIDTH] IN BLOOD BY AUTOMATED COUNT: 14.1 %
FASTING PATIENT LIPID ANSWER: YES
FASTING STATUS PATIENT QL REPORTED: YES
GLOBULIN PLAS-MCNC: 2.2 G/DL (ref 2–3.5)
GLUCOSE BLD-MCNC: 100 MG/DL (ref 70–99)
HCT VFR BLD AUTO: 49.9 % (ref 39–53)
HDLC SERPL-MCNC: 31 MG/DL (ref 40–59)
HGB BLD-MCNC: 16.9 G/DL (ref 13–17.5)
IMM GRANULOCYTES # BLD AUTO: 0.03 X10(3) UL (ref 0–1)
IMM GRANULOCYTES NFR BLD: 0.7 %
LDLC SERPL CALC-MCNC: 83 MG/DL (ref ?–100)
LYMPHOCYTES # BLD AUTO: 0.46 X10(3) UL (ref 1–4)
LYMPHOCYTES NFR BLD AUTO: 10.2 %
MCH RBC QN AUTO: 29.1 PG (ref 26–34)
MCHC RBC AUTO-ENTMCNC: 33.9 G/DL (ref 31–37)
MCV RBC AUTO: 86 FL (ref 80–100)
MONOCYTES # BLD AUTO: 0.72 X10(3) UL (ref 0.1–1)
MONOCYTES NFR BLD AUTO: 16 %
NEUTROPHILS # BLD AUTO: 3.14 X10 (3) UL (ref 1.5–7.7)
NEUTROPHILS # BLD AUTO: 3.14 X10(3) UL (ref 1.5–7.7)
NEUTROPHILS NFR BLD AUTO: 69.5 %
NONHDLC SERPL-MCNC: 118 MG/DL (ref ?–130)
OSMOLALITY SERPL CALC.SUM OF ELEC: 298 MOSM/KG (ref 275–295)
PLATELET # BLD AUTO: 192 10(3)UL (ref 150–450)
POTASSIUM SERPL-SCNC: 3.8 MMOL/L (ref 3.5–5.1)
PROT SERPL-MCNC: 6.7 G/DL (ref 5.7–8.2)
RBC # BLD AUTO: 5.8 X10(6)UL (ref 4.3–5.7)
SODIUM SERPL-SCNC: 143 MMOL/L (ref 136–145)
T4 FREE SERPL-MCNC: 1.3 NG/DL (ref 0.8–1.7)
TESTOST SERPL-MCNC: 522.94 NG/DL (ref 187.72–684.19)
TRIGL SERPL-MCNC: 207 MG/DL (ref 30–149)
TSI SER-ACNC: 3.8 UIU/ML (ref 0.55–4.78)
VLDLC SERPL CALC-MCNC: 33 MG/DL (ref 0–30)
WBC # BLD AUTO: 4.5 X10(3) UL (ref 4–11)

## 2025-06-27 PROCEDURE — 84403 ASSAY OF TOTAL TESTOSTERONE: CPT

## 2025-06-27 PROCEDURE — 84443 ASSAY THYROID STIM HORMONE: CPT

## 2025-06-27 PROCEDURE — 80061 LIPID PANEL: CPT

## 2025-06-27 PROCEDURE — 80053 COMPREHEN METABOLIC PANEL: CPT

## 2025-06-27 PROCEDURE — 84439 ASSAY OF FREE THYROXINE: CPT

## 2025-06-27 PROCEDURE — 36415 COLL VENOUS BLD VENIPUNCTURE: CPT

## 2025-06-27 PROCEDURE — 85025 COMPLETE CBC W/AUTO DIFF WBC: CPT

## 2025-06-30 ENCOUNTER — DOCUMENTATION ONLY (OUTPATIENT)
Dept: PHYSICAL THERAPY | Age: 55
End: 2025-06-30

## 2025-06-30 NOTE — PROGRESS NOTES
Patient: Lionel Velazquez (55 year old, male) Referring Provider:  Insurance:   Diagnosis: Chronic right-sided lumbar radiculopathy No ref. provider found  N/A   Date of Surgery: No data recorded Next MD visit:  N/A   Precautions:  None No data recorded Referral Information:    Date of Evaluation: Req: 0, Auth: 0, Exp:     06/13/25 POC Auth Visits:        Discharge Summary  Pt has attended 2 visits in Physical Therapy.     Today's Date   6/30/2025       Musculoskeletal:  Observation/Posture:   Decreased sagital plane mobility  Accessory Motion:  did not assess lumbar segments this session to avoid doing too many palpation assessments in one visit; to be assessed at a later visit   Palpation:   Increased tone along lumbar paraspinals, trigger point in R QL attachment, bilat gluteal restrictions; limited passive hip IR bilaterally    Special tests:   Toe walk L>R weakness no pain; heel walk brought on tension in R lower glute but wnl; R neurotension present    ROM and Strength:  (* denotes performed with pain)  Knee   ROM MMT (-/5)    R L R L     Flex             Ext (L3)     5/5 4-/5     ,   Ankle/Foot   ROM MMT (-/5)    R L R L     PF             DF (L4)     5/5 4/5             Goals (to be met in 10 visits)   Pt. To have DL of less than 15% disability.  Pt. To have WNL toe walking to ensure proper nerve signaling in LE to prevent risk of falling while walking.  Pt. To demonstrate 10 floor to waist transfers using 20 lbs and proper squatting mechanics to ensure safety when picking up heavier groceries.  Pt. To improve lumbar extension by 25% to ensure maintenance of upright posture with prolonged standing at home while doing dishes.  Pt. To be independent with home exercise program.    Unable to update goals as pt. Only able to attend 2 sessions of PT.          Oswestry Disability Index Score  Score: (Patient-Rptd) 34 % (6/6/2025  8:37 AM)      Plan: Pt. To continue with home exercise independently and follow up  with MD in regards to future plan of care should condition change. Pt. Unable to attend therapy for now due to being out of town for an extended period of time. However, should MD recommend pt. To continue once is trip is completed and conservative treatment still appropriate-patient would benefit from continuing physical therapy.    Patient/Family/Caregiver was advised of these findings, precautions, and treatment options and has agreed to actively participate in planning and for this course of care.    Thank you for your referral. If you have any questions, please contact me at Dept: [phone].    Sincerely,  Electronically signed by therapist: Dolly Whaley PT     Physician's certification required:  Yes  Please co-sign or sign and return this letter via fax as soon as possible to [phone].   I certify the need for these services furnished under this plan of treatment and while under my care.    X___________________________________________________ Date____________________    Certification From: 6/30/2025  To:9/28/2025

## 2025-07-21 DIAGNOSIS — E78.2 MIXED HYPERLIPIDEMIA: ICD-10-CM

## 2025-07-22 RX ORDER — ATORVASTATIN CALCIUM 40 MG/1
40 TABLET, FILM COATED ORAL DAILY
Qty: 90 TABLET | Refills: 0 | Status: SHIPPED | OUTPATIENT
Start: 2025-07-22

## 2025-07-22 NOTE — TELEPHONE ENCOUNTER
Requested Prescriptions     Pending Prescriptions Disp Refills    ATORVASTATIN 40 MG Oral Tab [Pharmacy Med Name: ATORVASTATIN 40 MG TABLET] 90 tablet 1     Sig: TAKE 1 TABLET BY MOUTH EVERY DAY     Last refill 1/18/25 #90 x 1  LOV 6/26/25  No future appointments.  Labs due 9/30/25    Cholesterol Medication Protocol [id number]/21/2025 01:24 PM   Protocol Details ALT < 80    ALT resulted within past year    Lipid panel within past 12 months    In person appointment or virtual visit in the past 12 mos or appointment in next 3 mos    Medication is active on med list

## 2025-07-30 RX ORDER — LEVOTHYROXINE SODIUM 88 UG/1
88 TABLET ORAL
Qty: 90 TABLET | Refills: 1 | OUTPATIENT
Start: 2025-07-30

## 2025-08-01 ENCOUNTER — TELEPHONE (OUTPATIENT)
Dept: NEUROLOGY | Facility: CLINIC | Age: 55
End: 2025-08-01

## 2025-08-01 DIAGNOSIS — G35 MULTIPLE SCLEROSIS (HCC): ICD-10-CM

## 2025-08-01 RX ORDER — DIROXIMEL FUMARATE 231 MG/1
2 CAPSULE ORAL 2 TIMES DAILY
Qty: 360 CAPSULE | Refills: 2 | Status: SHIPPED | OUTPATIENT
Start: 2025-08-01 | End: 2025-08-02

## 2025-08-02 DIAGNOSIS — G35 MULTIPLE SCLEROSIS (HCC): ICD-10-CM

## 2025-08-04 RX ORDER — DIROXIMEL FUMARATE 231 MG/1
2 CAPSULE ORAL 2 TIMES DAILY
Qty: 360 CAPSULE | Refills: 2 | Status: SHIPPED | OUTPATIENT
Start: 2025-08-04

## 2025-08-12 DIAGNOSIS — R79.89 LOW TESTOSTERONE IN MALE: ICD-10-CM

## 2025-08-14 RX ORDER — TESTOSTERONE CYPIONATE 200 MG/ML
200 INJECTION, SOLUTION INTRAMUSCULAR
Qty: 6 ML | Refills: 1 | Status: SHIPPED | OUTPATIENT
Start: 2025-08-14

## 2025-08-26 RX ORDER — TRIAMTERENE AND HYDROCHLOROTHIAZIDE 37.5; 25 MG/1; MG/1
1 TABLET ORAL DAILY
Qty: 90 TABLET | Refills: 0 | Status: SHIPPED | OUTPATIENT
Start: 2025-08-26

## 2025-08-29 ENCOUNTER — PATIENT MESSAGE (OUTPATIENT)
Dept: FAMILY MEDICINE CLINIC | Facility: CLINIC | Age: 55
End: 2025-08-29

## 2025-08-29 DIAGNOSIS — F41.1 GENERALIZED ANXIETY DISORDER: ICD-10-CM

## 2025-08-29 RX ORDER — CLONAZEPAM 1 MG/1
1 TABLET ORAL NIGHTLY PRN
Qty: 30 TABLET | Refills: 2 | Status: SHIPPED | OUTPATIENT
Start: 2025-08-29

## (undated) DIAGNOSIS — E29.1 MALE HYPOGONADISM: Primary | ICD-10-CM

## (undated) DIAGNOSIS — R79.89 LOW TESTOSTERONE IN MALE: ICD-10-CM

## (undated) DIAGNOSIS — G35 MULTIPLE SCLEROSIS (HCC): ICD-10-CM

## (undated) DIAGNOSIS — F41.1 GENERALIZED ANXIETY DISORDER: ICD-10-CM

## (undated) DEVICE — GLOVE SUR 7.5 SENSICARE PIP WHT PWD F

## (undated) DEVICE — GLOVE,SURG,SENSICARE,ALOE,LF,PF,7: Brand: MEDLINE

## (undated) DEVICE — AVANOS* TUOHY EPIDURAL NEEDLE: Brand: AVANOS

## (undated) DEVICE — PAIN TRAY: Brand: MEDLINE INDUSTRIES, INC.

## (undated) DEVICE — SYRINGE 10ML SLIP TIP LOSS OF RESIST PLAS

## (undated) DEVICE — REMOVER LOT 4OZ N IRRIG UNSCNT SFT MOIST LIQ

## (undated) DEVICE — BANDAGE ADH 1INX3IN NAT FAB N ADH PD CURAD

## (undated) DEVICE — SKIN REG/FINE DUAL MARKER, RULER, LABELS: Brand: MEDLINE

## (undated) NOTE — LETTER
10/04/18       Leticia Michel    5/23/1970        To Whom It May Concern,      Leticia Michel is under my care for the treatment of a medical condition. There is no contraindication from a neurology standpoint for patient to have surgery done.  Please

## (undated) NOTE — ED AVS SNAPSHOT
David Wilburmicky   MRN: [id number]    Department:  [facility] Emergency Department   Date of Visit:  8/16/2017           Disclosure     Insurance plans vary and the physician(s) referred by the ER may not be covered by your plan.  Please contact you If you have been prescribed any medication(s), please fill your prescription right away and begin taking the medication(s) as directed    If the emergency physician has read X-rays, these will be re-interpreted by a radiologist.  If there is a significant

## (undated) NOTE — LETTER
09/24/20      [facility]   5/23/1970  PA# [id number] TERESO    To Whom It May Concern,      I am writing this letter to appeal the denial regarding my patient, Jemima Rivera, receiving Tecfidera 120mg bid due to  Not meeting the criteria forPost Limit